# Patient Record
Sex: FEMALE | Race: WHITE | NOT HISPANIC OR LATINO | Employment: OTHER | URBAN - METROPOLITAN AREA
[De-identification: names, ages, dates, MRNs, and addresses within clinical notes are randomized per-mention and may not be internally consistent; named-entity substitution may affect disease eponyms.]

---

## 2016-01-13 LAB
CHOL/HDLC RATIO: 4.2
CHOLEST SERPL-MSCNC: 219 MG/DL (ref 0–200)
HDLC SERPL-MCNC: 52 MG/DL
LDLC SERPL CALC-MCNC: 139 MG/DL
NON HDL CHOL. (LDL+VLDL): 167
TRIGL SERPL-MCNC: 141 MG/DL

## 2017-09-12 ENCOUNTER — LAB VISIT (OUTPATIENT)
Dept: LAB | Facility: HOSPITAL | Age: 82
End: 2017-09-12
Attending: FAMILY MEDICINE
Payer: MEDICARE

## 2017-09-12 ENCOUNTER — OFFICE VISIT (OUTPATIENT)
Dept: FAMILY MEDICINE | Facility: CLINIC | Age: 82
End: 2017-09-12
Payer: MEDICARE

## 2017-09-12 VITALS
TEMPERATURE: 98 F | OXYGEN SATURATION: 96 % | HEIGHT: 67 IN | DIASTOLIC BLOOD PRESSURE: 60 MMHG | HEART RATE: 74 BPM | RESPIRATION RATE: 14 BRPM | SYSTOLIC BLOOD PRESSURE: 120 MMHG | BODY MASS INDEX: 25.14 KG/M2 | WEIGHT: 160.19 LBS

## 2017-09-12 DIAGNOSIS — H61.23 BILATERAL IMPACTED CERUMEN: ICD-10-CM

## 2017-09-12 DIAGNOSIS — R91.1 LUNG NODULE: ICD-10-CM

## 2017-09-12 DIAGNOSIS — F41.1 GENERALIZED ANXIETY DISORDER: Primary | ICD-10-CM

## 2017-09-12 DIAGNOSIS — I10 ESSENTIAL HYPERTENSION: ICD-10-CM

## 2017-09-12 DIAGNOSIS — Z79.01 LONG TERM CURRENT USE OF ANTICOAGULANT: ICD-10-CM

## 2017-09-12 DIAGNOSIS — E55.9 VITAMIN D DEFICIENCY: ICD-10-CM

## 2017-09-12 DIAGNOSIS — E04.2 MULTIPLE THYROID NODULES: ICD-10-CM

## 2017-09-12 DIAGNOSIS — J44.9 CHRONIC OBSTRUCTIVE PULMONARY DISEASE, UNSPECIFIED COPD TYPE: ICD-10-CM

## 2017-09-12 DIAGNOSIS — D32.9 MENINGIOMA: ICD-10-CM

## 2017-09-12 DIAGNOSIS — Z86.711 HISTORY OF PULMONARY EMBOLISM: ICD-10-CM

## 2017-09-12 DIAGNOSIS — I25.10 CORONARY ARTERY DISEASE INVOLVING NATIVE CORONARY ARTERY OF NATIVE HEART WITHOUT ANGINA PECTORIS: ICD-10-CM

## 2017-09-12 LAB
ALBUMIN SERPL BCP-MCNC: 3.6 G/DL
ALP SERPL-CCNC: 110 U/L
ALT SERPL W/O P-5'-P-CCNC: 20 U/L
ANION GAP SERPL CALC-SCNC: 11 MMOL/L
AST SERPL-CCNC: 22 U/L
BILIRUB SERPL-MCNC: 0.5 MG/DL
BUN SERPL-MCNC: 25 MG/DL
CALCIUM SERPL-MCNC: 9.5 MG/DL
CHLORIDE SERPL-SCNC: 100 MMOL/L
CO2 SERPL-SCNC: 31 MMOL/L
CREAT SERPL-MCNC: 0.8 MG/DL
EST. GFR  (AFRICAN AMERICAN): >60 ML/MIN/1.73 M^2
EST. GFR  (NON AFRICAN AMERICAN): >60 ML/MIN/1.73 M^2
GLUCOSE SERPL-MCNC: 71 MG/DL
INR PPP: 2.3
POTASSIUM SERPL-SCNC: 4.4 MMOL/L
PROT SERPL-MCNC: 7.2 G/DL
PROTHROMBIN TIME: 23.4 SEC
SODIUM SERPL-SCNC: 142 MMOL/L
T4 FREE SERPL-MCNC: 0.88 NG/DL
TSH SERPL DL<=0.005 MIU/L-ACNC: 2.23 UIU/ML

## 2017-09-12 PROCEDURE — 36415 COLL VENOUS BLD VENIPUNCTURE: CPT | Mod: PO

## 2017-09-12 PROCEDURE — 85025 COMPLETE CBC W/AUTO DIFF WBC: CPT

## 2017-09-12 PROCEDURE — 99999 PR PBB SHADOW E&M-NEW PATIENT-LVL IV: CPT | Mod: PBBFAC,,, | Performed by: FAMILY MEDICINE

## 2017-09-12 PROCEDURE — 1159F MED LIST DOCD IN RCRD: CPT | Mod: S$GLB,,, | Performed by: FAMILY MEDICINE

## 2017-09-12 PROCEDURE — 99204 OFFICE O/P NEW MOD 45 MIN: CPT | Mod: S$GLB,,, | Performed by: FAMILY MEDICINE

## 2017-09-12 PROCEDURE — 3008F BODY MASS INDEX DOCD: CPT | Mod: S$GLB,,, | Performed by: FAMILY MEDICINE

## 2017-09-12 PROCEDURE — 85610 PROTHROMBIN TIME: CPT

## 2017-09-12 PROCEDURE — 84439 ASSAY OF FREE THYROXINE: CPT

## 2017-09-12 PROCEDURE — 80053 COMPREHEN METABOLIC PANEL: CPT

## 2017-09-12 PROCEDURE — 84443 ASSAY THYROID STIM HORMONE: CPT

## 2017-09-12 PROCEDURE — 82306 VITAMIN D 25 HYDROXY: CPT

## 2017-09-12 PROCEDURE — 1126F AMNT PAIN NOTED NONE PRSNT: CPT | Mod: S$GLB,,, | Performed by: FAMILY MEDICINE

## 2017-09-12 RX ORDER — WARFARIN SODIUM 5 MG/1
5 TABLET ORAL DAILY
COMMUNITY
End: 2017-09-21 | Stop reason: SDUPTHER

## 2017-09-12 RX ORDER — ESCITALOPRAM OXALATE 20 MG/1
20 TABLET ORAL DAILY
COMMUNITY
End: 2017-09-12 | Stop reason: SDUPTHER

## 2017-09-12 RX ORDER — ALPRAZOLAM 0.25 MG/1
0.25 TABLET ORAL 2 TIMES DAILY PRN
Status: ON HOLD | COMMUNITY
End: 2019-12-31 | Stop reason: HOSPADM

## 2017-09-12 RX ORDER — ESCITALOPRAM OXALATE 20 MG/1
20 TABLET ORAL DAILY
Qty: 90 TABLET | Refills: 1 | Status: SHIPPED | OUTPATIENT
Start: 2017-09-12 | End: 2017-09-27 | Stop reason: SDUPTHER

## 2017-09-12 RX ORDER — WARFARIN 4 MG/1
4 TABLET ORAL DAILY
COMMUNITY
End: 2017-09-21 | Stop reason: SDUPTHER

## 2017-09-12 RX ORDER — HYDROXYZINE HYDROCHLORIDE 25 MG/1
25 TABLET, FILM COATED ORAL 3 TIMES DAILY PRN
COMMUNITY
End: 2019-02-27

## 2017-09-12 NOTE — PROGRESS NOTES
"Subjective:       Patient ID: Ashley Koenig is a 89 y.o. female.    Chief Complaint: Anxiety and Pulmonary Embolism    Anxiety   Presents for initial visit. Onset was 1 to 5 years ago. The problem has been waxing and waning. Symptoms include depressed mood and nervous/anxious behavior. Patient reports no chest pain, confusion, dizziness, excessive worry, irritability, malaise, nausea, palpitations, panic, restlessness, shortness of breath or suicidal ideas. Symptoms occur most days. The severity of symptoms is mild. The symptoms are aggravated by family issues (recent relocation to [place] from [place]; death of daughter). The quality of sleep is fair. Nighttime awakenings: occasional.     Risk factors include a major life event. Her past medical history is significant for anxiety/panic attacks, CAD, chronic lung disease and depression. There is no history of asthma or hyperthyroidism. Past treatments include SSRIs and benzodiazephines. The treatment provided significant relief. Compliance with prior treatments has been good.   Patient is brought to clinic today by her son. She has relocated to [place] from [place] within the past month. She has been maintained on Lexapro with occasional use of Xanax- she admits she has relief on Xanax more with the recent move than in the past. She generally takes 1/2 tab of Xanax when needed. Per her son a bottle may last an entire year. Most recently an RX for hydroxyzine was added to her medication regimen but she has not required use. She is living with her son. She is independent of some activities but does use a walker for ambulation. Sleep patterns vary. Appetite has been slightly decreased but she denies any concerns. She has never seen Psychiatry in the past. No known issues with memory. She does have history of meningioma and was previously followed by Neurology. At one point she had gamma knife therapy "many years ago" and the decision was made at that time to discontinue " monitoring.     Pulmonary Embolism  Patient reports having been placed on Coumadin after she was found to have a PE. Her son believes this was diagnosed two years ago. No known DVT. No known valvular issues. She had monthly INR monitoring with some recent difficulty with maintaining therapeutic state. Her last INR was at least 1 month ago before she moved from [place]- no dosing adjustment was made at that time. Patient notes bruising as a side effect of Coumadin but no bleeding issues. She is ambulatory with assistance of a walker as above. There have been no recent falls or injuries.     Past Medical History:   Diagnosis Date    Age-related macular degeneration, wet, both eyes     Anginal equivalent     Anxiety     BPPV (benign paroxysmal positional vertigo)     Brain tumor     Clotting disorder     lung    COPD (chronic obstructive pulmonary disease)     Depression     Diastolic dysfunction     Hearing loss     Heart disease     Hyperlipidemia     Hypertension     Hypoxemia     Lung nodule     Meningioma     Multiple thyroid nodules     Pulmonary embolism     Pulmonary hypertension     Sleep apnea in adult     Stroke     Vitamin D deficiency      Past Surgical History:   Procedure Laterality Date    TOTAL HIP ARTHROPLASTY       History reviewed. No pertinent family history.    Review of Systems   Constitutional: Negative for activity change, appetite change, fatigue, irritability and unexpected weight change.   HENT: Positive for dental problem and hearing loss. Negative for congestion, sinus pressure and sore throat.    Eyes: Positive for visual disturbance. Negative for pain, discharge and redness.   Respiratory: Negative for cough, chest tightness and shortness of breath.    Cardiovascular: Negative for chest pain, palpitations and leg swelling.   Gastrointestinal: Negative for abdominal pain, blood in stool, constipation, diarrhea and nausea.   Endocrine: Negative for cold intolerance  "and heat intolerance.   Genitourinary: Negative for decreased urine volume, difficulty urinating and dysuria.        +urinary incontinence   Musculoskeletal: Positive for arthralgias. Negative for myalgias.   Skin: Negative for color change and rash.   Neurological: Negative for dizziness, weakness, light-headedness and headaches.   Hematological: Bruises/bleeds easily.   Psychiatric/Behavioral: Negative for confusion, dysphoric mood and suicidal ideas. The patient is nervous/anxious.        Objective:   /60   Pulse 74   Temp 97.9 °F (36.6 °C) (Temporal)   Resp 14   Ht 5' 7" (1.702 m)   Wt 72.7 kg (160 lb 2.6 oz)   SpO2 96%   BMI 25.09 kg/m²   Physical Exam   Constitutional: She is oriented to person, place, and time. She appears well-developed and well-nourished. No distress.   HENT:   Head: Normocephalic and atraumatic.   Right Ear: External ear normal. No tenderness. Decreased hearing is noted.   Left Ear: External ear normal. No tenderness. Decreased hearing is noted.   Nose: Nose normal.   Mouth/Throat: Oropharynx is clear and moist. Abnormal dentition.   Cerumen within both ear canals; hearing aids   Eyes: Conjunctivae and EOM are normal. Pupils are equal, round, and reactive to light.   Neck: Normal range of motion. Neck supple. No thyromegaly present.   Cardiovascular: Normal rate, regular rhythm and normal heart sounds.    Pulmonary/Chest: Effort normal and breath sounds normal.   Abdominal: Soft. Bowel sounds are normal. There is no tenderness.   Musculoskeletal: She exhibits no edema or tenderness.   Neurological: She is alert and oriented to person, place, and time.   Skin: Skin is warm and dry. She is not diaphoretic.   Scattered bruising to the bilateral upper and lower extremities   Psychiatric: She has a normal mood and affect. Her speech is normal and behavior is normal.       Assessment:       1. Generalized anxiety disorder    2. Essential hypertension    3. Chronic obstructive " pulmonary disease, unspecified COPD type    4. Lung nodule    5. Coronary artery disease involving native coronary artery of native heart without angina pectoris    6. Meningioma    7. Multiple thyroid nodules    8. Vitamin D deficiency    9. Bilateral impacted cerumen    10. History of pulmonary embolism    11. Long term current use of anticoagulant        Plan:   Generalized anxiety disorder  Reviewed coping mechanisms. Discussed current treatment with the combination of Lexapro and Xanax. Have recommended against hydroxyzine use as well as Xanax use due to sedation and fall potential- particularly in a patient taking Coumadin. Will continue with Lexapro at current dosing. Patient is aware of counseling/therapy options within [facility] as well as Psychiatry if needed.  -     escitalopram oxalate (LEXAPRO) 20 MG tablet; Take 1 tablet (20 mg total) by mouth once daily.  Dispense: 90 tablet; Refill: 1    Essential hypertension  Stable BP without RX measures. Limited documents brought by the patient's son were reviewed with the patient in office today. She has not been on any RX measures in years per her recollection.   -     Comprehensive metabolic panel; Future; Expected date: 09/12/2017  -     CBC auto differential; Future; Expected date: 09/12/2017    Chronic obstructive pulmonary disease, unspecified COPD type  Patient's paperwork details previous diagnosis of COPD- she has no recollection. She is not maintained on any inhalers or nebulized treatments. She denies any chronic cough. Will send for formal records and consider whether further evaluation is necessary. A seasonal flu vaccine is recommended this fall.    Lung nodule  As per #3. Patient reports no knowledge. Awaiting receipt of records for additional recommendations.    Coronary artery disease involving native coronary artery of native heart without angina pectoris  Per limited paperwork brought to clinic today. She is not maintained on any therapy at present  and denies any symptoms of CP or palpitations. Will await records and determine whether further assessment per Cardiology is needed.    Meningioma  As per HPI she has elected to forego further monitoring and Neurologic assessment.     Multiple thyroid nodules  -     TSH; Future; Expected date: 09/12/2017  -     T4, free; Future; Expected date: 09/12/2017    Vitamin D deficiency  -     Vitamin D; Future; Expected date: 09/12/2017    Bilateral impacted cerumen  Consider Debrox drop use while awaiting ENT evaluation.  -     Ambulatory Referral to ENT    History of pulmonary embolism  Anticoagulated for at least the past 2 years by her former PCP according to the patient and her son. Have advised updated INR monitoring given current Coumadin dosing regimen. Discussed undergoing further evaluation per Heme/Onc to determine whether continued use given age/risk factors is advisable. If deemed appropriate she is asked to have levels managed by Coumadin Clinic.  -     Protime-INR; Future; Expected date: 09/12/2017  -     Ambulatory Referral to Hematology / Oncology    Long term current use of anticoagulant  As above.  -     Ambulatory Referral to Hematology / Oncology

## 2017-09-13 LAB
25(OH)D3+25(OH)D2 SERPL-MCNC: 28 NG/ML
BASOPHILS # BLD AUTO: 0.03 K/UL
BASOPHILS NFR BLD: 0.3 %
DIFFERENTIAL METHOD: ABNORMAL
EOSINOPHIL # BLD AUTO: 0.1 K/UL
EOSINOPHIL NFR BLD: 0.8 %
ERYTHROCYTE [DISTWIDTH] IN BLOOD BY AUTOMATED COUNT: 13.8 %
HCT VFR BLD AUTO: 47.1 %
HGB BLD-MCNC: 15.4 G/DL
LYMPHOCYTES # BLD AUTO: 1.8 K/UL
LYMPHOCYTES NFR BLD: 14.8 %
MCH RBC QN AUTO: 33.6 PG
MCHC RBC AUTO-ENTMCNC: 32.7 G/DL
MCV RBC AUTO: 103 FL
MONOCYTES # BLD AUTO: 0.8 K/UL
MONOCYTES NFR BLD: 6.9 %
NEUTROPHILS # BLD AUTO: 9.2 K/UL
NEUTROPHILS NFR BLD: 77.2 %
PLATELET # BLD AUTO: 351 K/UL
PLATELET BLD QL SMEAR: ABNORMAL
PMV BLD AUTO: 10.2 FL
RBC # BLD AUTO: 4.58 M/UL
WBC # BLD AUTO: 11.99 K/UL

## 2017-09-20 ENCOUNTER — TELEPHONE (OUTPATIENT)
Dept: FAMILY MEDICINE | Facility: CLINIC | Age: 82
End: 2017-09-20

## 2017-09-20 NOTE — TELEPHONE ENCOUNTER
----- Message from Efren Olivier sent at 9/19/2017  4:46 PM CDT -----  Contact: Pt   ..1. What is the name of the medication you are requesting? Zoloft   2. What is the dose? 20 mgs   3. How do you take the medication? Orally, topically, etc? Orally   4. How often do you take this medication? Once a day   5. Do you need a 30 day or 90 day supply? 30 day   6. How many refills are you requesting? 1   7. What is your preferred pharmacy and location of the pharmacy?       [address] [address] [facility]  [address] [address] [facility] [address]  Phone: [phone] Fax: [phone]      8. Who can we contact with further questions? Pt son Mook Koenig [phone]

## 2017-09-21 ENCOUNTER — INITIAL CONSULT (OUTPATIENT)
Dept: HEMATOLOGY/ONCOLOGY | Facility: CLINIC | Age: 82
End: 2017-09-21
Payer: MEDICARE

## 2017-09-21 ENCOUNTER — TELEPHONE (OUTPATIENT)
Dept: FAMILY MEDICINE | Facility: CLINIC | Age: 82
End: 2017-09-21

## 2017-09-21 VITALS
HEIGHT: 67 IN | RESPIRATION RATE: 16 BRPM | HEART RATE: 74 BPM | WEIGHT: 159.38 LBS | DIASTOLIC BLOOD PRESSURE: 54 MMHG | SYSTOLIC BLOOD PRESSURE: 100 MMHG | BODY MASS INDEX: 25.01 KG/M2 | OXYGEN SATURATION: 90 %

## 2017-09-21 DIAGNOSIS — Z86.711 HISTORY OF PULMONARY EMBOLISM: ICD-10-CM

## 2017-09-21 DIAGNOSIS — Z79.01 LONG TERM CURRENT USE OF ANTICOAGULANT: Primary | ICD-10-CM

## 2017-09-21 PROCEDURE — 3008F BODY MASS INDEX DOCD: CPT | Mod: S$GLB,,, | Performed by: INTERNAL MEDICINE

## 2017-09-21 PROCEDURE — 1159F MED LIST DOCD IN RCRD: CPT | Mod: S$GLB,,, | Performed by: INTERNAL MEDICINE

## 2017-09-21 PROCEDURE — 99999 PR PBB SHADOW E&M-EST. PATIENT-LVL III: CPT | Mod: PBBFAC,,, | Performed by: INTERNAL MEDICINE

## 2017-09-21 PROCEDURE — 99205 OFFICE O/P NEW HI 60 MIN: CPT | Mod: S$GLB,,, | Performed by: INTERNAL MEDICINE

## 2017-09-21 PROCEDURE — 1126F AMNT PAIN NOTED NONE PRSNT: CPT | Mod: S$GLB,,, | Performed by: INTERNAL MEDICINE

## 2017-09-21 RX ORDER — WARFARIN SODIUM 5 MG/1
5 TABLET ORAL DAILY
Qty: 30 TABLET | Refills: 11 | Status: SHIPPED | OUTPATIENT
Start: 2017-09-21 | End: 2017-10-11

## 2017-09-21 RX ORDER — WARFARIN 4 MG/1
4 TABLET ORAL DAILY
Qty: 30 TABLET | Refills: 11 | Status: SHIPPED | OUTPATIENT
Start: 2017-09-21 | End: 2017-10-11

## 2017-09-21 NOTE — LETTER
September 21, 2017      Fadumo Crawford MD  [address] [address] [address]           [facility]  [id number] [place] [address]  Phone: [phone]  Fax: [phone]          Patient: Ashley Koenig   MR Number: [id number]   YOB: 1926   Date of Visit: 9/21/2017       Dear Dr. Fadumo Crawford:    Thank you for referring Ashley Koenig to me for evaluation. Attached you will find relevant portions of my assessment and plan of care.    If you have questions, please do not hesitate to call me. I look forward to following Ashley Koenig along with you.    Sincerely,    Anoop Snyder MD    Enclosure  CC:  No Recipients    If you would like to receive this communication electronically, please contact [email or web address] or [phone] to request more information on [employer] Link access.    For providers and/or their staff who would like to refer a patient to [facility], please contact us through our one-stop-shop provider referral line, [facility], at [phone].    If you feel you have received this communication in error or would no longer like to receive these types of communications, please e-mail [email or web address]

## 2017-09-21 NOTE — PROGRESS NOTES
Subjective:       Patient ID: Ashley Koenig is a 89 y.o. female.    Chief Complaint: Follow-up; Results; and Coagulation Disorder    HPI 89-year-old female with history of pulmonary emboli 2 years ago and treated in [place] the patient presents after moving here to [place] for monitoring of Coumadin.  The patient has been on Coumadin for 2 years she is not sure nor is her son who is present with her why she has been on such a prolonged dose of medication.    Past Medical History:   Diagnosis Date    Age-related macular degeneration, wet, both eyes     Anginal equivalent     Anxiety     BPPV (benign paroxysmal positional vertigo)     Brain tumor     Clotting disorder     lung    COPD (chronic obstructive pulmonary disease)     Depression     Diastolic dysfunction     Hearing loss     Heart disease     Hyperlipidemia     Hypertension     Hypoxemia     Lung nodule     Meningioma     Multiple thyroid nodules     Pulmonary embolism     Pulmonary hypertension     Sleep apnea in adult     Stroke     Vitamin D deficiency      History reviewed. No pertinent family history.  Social History     Social History    Marital status:      Spouse name: N/A    Number of children: N/A    Years of education: N/A     Occupational History    Not on file.     Social History Main Topics    Smoking status: Never Smoker    Smokeless tobacco: Never Used    Alcohol use No    Drug use: No    Sexual activity: Not Currently     Other Topics Concern    Not on file     Social History Narrative    No narrative on file     Past Surgical History:   Procedure Laterality Date    TOTAL HIP ARTHROPLASTY         Labs:  Lab Results   Component Value Date    WBC 11.99 09/12/2017    HGB 15.4 09/12/2017    HCT 47.1 09/12/2017     (H) 09/12/2017     (H) 09/12/2017     BMP  Lab Results   Component Value Date     09/12/2017    K 4.4 09/12/2017     09/12/2017    CO2 31 (H) 09/12/2017     BUN 25 09/12/2017    CREATININE 0.8 09/12/2017    CALCIUM 9.5 09/12/2017    ANIONGAP 11 09/12/2017    ESTGFRAFRICA >60.0 09/12/2017    EGFRNONAA >60.0 09/12/2017     Lab Results   Component Value Date    ALT 20 09/12/2017    AST 22 09/12/2017    ALKPHOS 110 09/12/2017    BILITOT 0.5 09/12/2017       No results found for: IRON, TIBC, FERRITIN, SATURATEDIRO  No results found for: [id number]  No results found for: FOLATE  Lab Results   Component Value Date    TSH 2.229 09/12/2017         Review of Systems   Constitutional: Positive for activity change and fatigue. Negative for appetite change, chills, diaphoresis, fever and unexpected weight change.   HENT: Negative for congestion, dental problem, drooling, ear discharge, ear pain, facial swelling, hearing loss, mouth sores, nosebleeds, postnasal drip, rhinorrhea, sinus pressure, sneezing, sore throat, tinnitus, trouble swallowing and voice change.    Eyes: Negative for photophobia, pain, discharge, redness, itching and visual disturbance.   Respiratory: Negative for cough, choking, chest tightness, shortness of breath, wheezing and stridor.    Cardiovascular: Negative for chest pain, palpitations and leg swelling.   Gastrointestinal: Negative for abdominal distention, abdominal pain, anal bleeding, blood in stool, constipation, diarrhea, nausea, rectal pain and vomiting.   Endocrine: Negative for cold intolerance, heat intolerance, polydipsia, polyphagia and polyuria.   Genitourinary: Negative for decreased urine volume, difficulty urinating, dyspareunia, dysuria, enuresis, flank pain, frequency, genital sores, hematuria, menstrual problem, pelvic pain, urgency, vaginal bleeding, vaginal discharge and vaginal pain.   Musculoskeletal: Positive for gait problem. Negative for arthralgias, back pain, joint swelling, myalgias, neck pain and neck stiffness.   Skin: Negative for color change, pallor and rash.   Allergic/Immunologic: Negative for environmental allergies, food  allergies and immunocompromised state.   Neurological: Positive for weakness. Negative for dizziness, tremors, seizures, syncope, facial asymmetry, speech difficulty, light-headedness, numbness and headaches.   Hematological: Negative for adenopathy. Does not bruise/bleed easily.   Psychiatric/Behavioral: Positive for dysphoric mood. Negative for agitation, behavioral problems, confusion, decreased concentration, hallucinations, self-injury, sleep disturbance and suicidal ideas. The patient is nervous/anxious. The patient is not hyperactive.        Objective:      Physical Exam   Constitutional: She is oriented to person, place, and time. She appears cachectic. She has a sickly appearance. She appears ill. She appears distressed.   HENT:   Head: Normocephalic and atraumatic.   Right Ear: External ear normal. Decreased hearing is noted.   Left Ear: External ear normal. Decreased hearing is noted.   Ears:    Nose: Nose normal. Right sinus exhibits no maxillary sinus tenderness and no frontal sinus tenderness. Left sinus exhibits no maxillary sinus tenderness and no frontal sinus tenderness.   Mouth/Throat: Oropharynx is clear and moist. No oropharyngeal exudate.   Eyes: Conjunctivae, EOM and lids are normal. Pupils are equal, round, and reactive to light. Right eye exhibits no discharge. Left eye exhibits no discharge. Right conjunctiva is not injected. Right conjunctiva has no hemorrhage. Left conjunctiva is not injected. Left conjunctiva has no hemorrhage. No scleral icterus.   Neck: Normal range of motion. Neck supple. No JVD present. No tracheal deviation present. No thyromegaly present.   Cardiovascular: Normal rate, regular rhythm, normal heart sounds and intact distal pulses.    Pulmonary/Chest: Effort normal and breath sounds normal. No stridor. No respiratory distress. She exhibits no tenderness.   Abdominal: Soft. Bowel sounds are normal. She exhibits no distension and no mass. There is no splenomegaly or  hepatomegaly. There is no tenderness. There is no rebound.   Musculoskeletal: Normal range of motion. She exhibits no edema or tenderness.   Lymphadenopathy:     She has no cervical adenopathy.     She has no axillary adenopathy.        Right: No supraclavicular adenopathy present.        Left: No supraclavicular adenopathy present.   Neurological: She is alert and oriented to person, place, and time. No cranial nerve deficit. Coordination abnormal.   Skin: Skin is dry. No rash noted. She is not diaphoretic. No erythema.   Psychiatric: Her behavior is normal. Judgment and thought content normal. Her mood appears anxious. She exhibits a depressed mood.   Vitals reviewed.          Assessment:      1. Long term current use of anticoagulant    2. History of pulmonary embolism           Plan:   I'm not sure why the patient is on such prolonged warfarin therapy she is unsteady I will continue with this at the present time try to obtain outside records she has been referred to ambulatory Coumadin monitoring clinic for evaluation of see back in 2-3 weeks after information is obtained for my review I have refilled her prescription she currently takes warfarin 4 mg daily except for Wednesdays and Thursdays when she takes 5 mg

## 2017-09-21 NOTE — TELEPHONE ENCOUNTER
----- Message from Kerline Love LPN sent at 9/21/2017  1:25 PM CDT -----  Do you guys have outside records on this patient? Dr bailon is asking. Thanks, Kerline

## 2017-09-22 ENCOUNTER — TELEPHONE (OUTPATIENT)
Dept: CARDIOLOGY | Facility: CLINIC | Age: 82
End: 2017-09-22

## 2017-09-25 ENCOUNTER — TELEPHONE (OUTPATIENT)
Dept: FAMILY MEDICINE | Facility: CLINIC | Age: 82
End: 2017-09-25

## 2017-09-25 NOTE — TELEPHONE ENCOUNTER
Called and spoke with pt's son, informed him that we received from the pharmacy that 20 mg of lexapro is not recommended for pt due to her age. Pt's son became very verbal and aggressive with his words and I informed him again of pt's age the 20 mg is not recommended. Dr. Parker could send in the 10 mg , but not the 20 mg . Pt son stated he will change her insurance with cuss words and change Dr's with cuss words. I told pt's son if he would like he can talk to my supervisor. Pt then cussed again and I informed him I will pass this along and pt son hung up.

## 2017-09-25 NOTE — TELEPHONE ENCOUNTER
----- Message from Bren Carrillo sent at 9/25/2017 11:32 AM CDT -----  Contact: son- [phone]  2nd call regarding RX refill.

## 2017-09-25 NOTE — TELEPHONE ENCOUNTER
----- Message from Rui Wells sent at 9/25/2017  9:03 AM CDT -----  Contact: [phone]  Pt returning nurse call about Lexapro refill. Please call bk at [phone]. Thx. lj    .    [address] [address] [facility]  [address] [address] [facility] [address]  Phone: [phone] Fax: [phone]

## 2017-09-26 PROBLEM — F41.9 ANXIETY: Status: ACTIVE | Noted: 2017-09-26

## 2017-09-27 ENCOUNTER — OFFICE VISIT (OUTPATIENT)
Dept: INTERNAL MEDICINE | Facility: CLINIC | Age: 82
End: 2017-09-27
Payer: MEDICARE

## 2017-09-27 VITALS
WEIGHT: 158 LBS | BODY MASS INDEX: 24.8 KG/M2 | HEART RATE: 65 BPM | OXYGEN SATURATION: 96 % | SYSTOLIC BLOOD PRESSURE: 116 MMHG | TEMPERATURE: 98 F | DIASTOLIC BLOOD PRESSURE: 72 MMHG | HEIGHT: 67 IN

## 2017-09-27 DIAGNOSIS — Z79.01 LONG TERM CURRENT USE OF ANTICOAGULANT: Primary | ICD-10-CM

## 2017-09-27 DIAGNOSIS — F41.9 ANXIETY: ICD-10-CM

## 2017-09-27 DIAGNOSIS — Z86.711 HISTORY OF PULMONARY EMBOLISM: ICD-10-CM

## 2017-09-27 DIAGNOSIS — F41.1 GENERALIZED ANXIETY DISORDER: ICD-10-CM

## 2017-09-27 PROCEDURE — 99214 OFFICE O/P EST MOD 30 MIN: CPT | Mod: S$GLB,,, | Performed by: FAMILY MEDICINE

## 2017-09-27 PROCEDURE — 99999 PR PBB SHADOW E&M-EST. PATIENT-LVL III: CPT | Mod: PBBFAC,,, | Performed by: FAMILY MEDICINE

## 2017-09-27 PROCEDURE — 3008F BODY MASS INDEX DOCD: CPT | Mod: S$GLB,,, | Performed by: FAMILY MEDICINE

## 2017-09-27 PROCEDURE — 1126F AMNT PAIN NOTED NONE PRSNT: CPT | Mod: S$GLB,,, | Performed by: FAMILY MEDICINE

## 2017-09-27 PROCEDURE — 1159F MED LIST DOCD IN RCRD: CPT | Mod: S$GLB,,, | Performed by: FAMILY MEDICINE

## 2017-09-27 RX ORDER — ESCITALOPRAM OXALATE 20 MG/1
20 TABLET ORAL DAILY
Qty: 90 TABLET | Refills: 1 | Status: SHIPPED | OUTPATIENT
Start: 2017-09-27 | End: 2017-12-12 | Stop reason: SDUPTHER

## 2017-09-27 NOTE — PROGRESS NOTES
Ashley Koenig  09/27/2017  [id number]    Fadumo Crawford MD  Patient Care Team:  Fadumo Crawford MD as PCP - General (Family Medicine)  Has the patient seen any provider outside of the [facility] network since the last visit? (no). If yes, HIPPA forms completed and records requested.        Visit Type:a scheduled routine follow-up visit    Chief Complaint:  No chief complaint on file.      History of Present Illness:  89 year old here with her son.  She initially established care with Dr. Elena Irizarry. Last appt with her was on 9/12.    She gave history of Anxiey and panic disorder. She has been maintained on Lexapro 20 mg and prn use of xanax.  Patient reports that she will take 1/2 table of Xanax when needed. She lives with her son, after relocating from [place].     She did see Dr. Snyder in consult. She came to Dr. Parker on Coumadin for a PE, but had been on this reported for year. Denies falls or bleeding risk. He has continued her on the Coumadin until more records could be obtained.     She has a remote history of meningioma, but reports decided against future survillence due to advanced age and stablity.    Son called yesterday to get refill on her Lexapro, but there was questioning on the dose due to her advanced age. The son became angry and that is why he is here seeing me at this clinic.  Answers for HPI/ROS submitted by the patient on 9/26/2017   activity change: No  unexpected weight change: No  rhinorrhea: No  trouble swallowing: No  visual disturbance: No  chest tightness: No  polyuria: No  difficulty urinating: No  menstrual problem: No  joint swelling: No  arthralgias: No  confusion: No  dysphoric mood: No          History:  Past Medical History:   Diagnosis Date    Age-related macular degeneration, wet, both eyes     Anginal equivalent     Anxiety     BPPV (benign paroxysmal positional vertigo)     Brain tumor     Clotting disorder     lung    COPD (chronic obstructive  pulmonary disease)     Depression     Diastolic dysfunction     Hearing loss     Heart disease     Hyperlipidemia     Hypertension     Hypoxemia     Lung nodule     Meningioma     Multiple thyroid nodules     Pulmonary embolism     Pulmonary hypertension     Sleep apnea in adult     Stroke     Vitamin D deficiency      Past Surgical History:   Procedure Laterality Date    TOTAL HIP ARTHROPLASTY       No family history on file.  Social History     Social History    Marital status:      Spouse name: N/A    Number of children: N/A    Years of education: N/A     Occupational History    Not on file.     Social History Main Topics    Smoking status: Never Smoker    Smokeless tobacco: Never Used    Alcohol use No    Drug use: No    Sexual activity: Not Currently     Other Topics Concern    Not on file     Social History Narrative    No narrative on file     Patient Active Problem List   Diagnosis    Meningioma    History of pulmonary embolism    Long term current use of anticoagulant    Coronary artery disease involving native coronary artery of native heart without angina pectoris    Lung nodule    Chronic obstructive pulmonary disease    Multiple thyroid nodules    Anxiety     Review of patient's allergies indicates:  No Known Allergies    The following were reviewed at this visit: active problem list, medication list, allergies, family history, social history, and health maintenance.    Medications:  Current Outpatient Prescriptions on File Prior to Visit   Medication Sig Dispense Refill    alprazolam (XANAX) 0.25 MG tablet Take 0.25 mg by mouth 2 (two) times daily as needed for Anxiety.      escitalopram oxalate (LEXAPRO) 20 MG tablet Take 1 tablet (20 mg total) by mouth once daily. 90 tablet 1    hydrOXYzine HCl (ATARAX) 25 MG tablet Take 25 mg by mouth 3 (three) times daily as needed for Itching.      warfarin (COUMADIN) 4 MG tablet Take 1 tablet (4 mg total) by mouth  Daily. ALL DAYS BUT wed & Friday WHEN TAKING 5mg 30 tablet 11    warfarin (COUMADIN) 5 MG tablet Take 1 tablet (5 mg total) by mouth Daily. Wed &fRIDAY ONLY 30 tablet 11     No current facility-administered medications on file prior to visit.        Medications have been reviewed and reconciled with patient at this visit.  Barriers to medications present (no)    Adverse reactions to current medications (no)    Over the counter medications reviewed (Yes ), and if needed added to active Medication list at this visit.     Exam:  Wt Readings from Last 3 Encounters:   09/21/17 72.3 kg (159 lb 6.3 oz)   09/12/17 72.7 kg (160 lb 2.6 oz)     Temp Readings from Last 3 Encounters:   09/12/17 97.9 °F (36.6 °C) (Temporal)     BP Readings from Last 3 Encounters:   09/21/17 (!) 100/54   09/12/17 120/60     Pulse Readings from Last 3 Encounters:   09/21/17 74   09/12/17 74     There is no height or weight on file to calculate BMI.      Review of Systems   HENT: Negative for hearing loss.    Eyes: Negative for discharge.   Respiratory: Negative for wheezing.    Cardiovascular: Negative for chest pain and palpitations.   Gastrointestinal: Negative for blood in stool, constipation, diarrhea and vomiting.   Genitourinary: Negative for dysuria and hematuria.   Musculoskeletal: Negative for neck pain.   Neurological: Negative for weakness and headaches.   Endo/Heme/Allergies: Negative for polydipsia.   Psychiatric/Behavioral: Negative for depression.       Physical Exam   Constitutional: She is oriented to person, place, and time. She appears well-developed and well-nourished. No distress.   HENT:   Head: Normocephalic and atraumatic.   Left Ear: External ear normal.   Nose: Nose normal.   Mouth/Throat: No oropharyngeal exudate.   Eyes: Conjunctivae and EOM are normal. Pupils are equal, round, and reactive to light. Right eye exhibits no discharge. Left eye exhibits no discharge.   Neck: Normal range of motion. Neck supple. No  thyromegaly present.   Cardiovascular: Normal rate, regular rhythm, normal heart sounds and intact distal pulses.    No murmur heard.  Pulmonary/Chest: Effort normal and breath sounds normal. No respiratory distress. She has no wheezes.   Musculoskeletal: Normal range of motion. She exhibits no edema.   Lymphadenopathy:     She has no cervical adenopathy.   Neurological: She is alert and oriented to person, place, and time.   Skin: She is not diaphoretic.   Psychiatric: She has a normal mood and affect. Her behavior is normal. Judgment and thought content normal.   Nursing note and vitals reviewed.      Laboratory Reviewed ({Yes)  Lab Results   Component Value Date    WBC 11.99 09/12/2017    HGB 15.4 09/12/2017    HCT 47.1 09/12/2017     (H) 09/12/2017    ALT 20 09/12/2017    AST 22 09/12/2017     09/12/2017    K 4.4 09/12/2017     09/12/2017    CREATININE 0.8 09/12/2017    BUN 25 09/12/2017    CO2 31 (H) 09/12/2017    TSH 2.229 09/12/2017    INR 2.3 (H) 09/12/2017       Assessment:  The primary encounter diagnosis was Long term current use of anticoagulant. Diagnoses of History of pulmonary embolism and Anxiety were also pertinent to this visit.     Plan     Diagnoses and all orders for this visit:    Long term current use of anticoagulant   Followed by Hematology   As of now on the Coumadin.   INR last week in range.   Awaiting records to see why chronic coumadin    History of pulmonary embolism   On Coumadin    Anxiety   Her treatment plan from previous PCP was  Lexapro 20 mg tablets, and prn xanax. She    She has been stable on this dose.   Previous provider discussed geratric dosing  Per Pharmacy recommendations to go to  10mg.   I reviewed Up To Date information and Prescribing information with patient and family.  Advised to step down to 10 mg of Lexapro and see how anxiety is doing. She has the prn xanax to use if needed.    Other health maintenance reviewed from previous PCP notes.  Awaiting records from [place].         Care Plan/Goals: Reviewed  (N/A)  Goals     None          Follow up: No Follow-up on file.    After visit summary was printed and given to patient upon discharge today.  Patient goals and care plan are included in After Visit Summary.

## 2017-09-27 NOTE — PATIENT INSTRUCTIONS
Your Bodys Response to Anxiety    Normal anxiety is part of the bodys natural defense system. It's an alert to a threat that is unknown, vague, or comes from your own internal fears. While youre in this state, your feelings can range from a vague sense of worry to physical sensations such as a pounding heartbeat. These feelings make you want to react to the threat. An anxiety response is normal in many situations. But when you have an anxiety disorder, the same response can occur at the wrong times.  Anxiety can be helpful  Normal anxiety is a signal from your brain that warns you of a threat and is a normal response to help you prevent something or decrease the bad effects of something you can't control. For example, anxiety is a normal response to situations that might damage your body, separate you from a loved one, or lose your job. The symptoms of anxiety can be physical and mental.  How does it feel?  At certain times, people with anxiety may have:  · Dizziness  · Muscle tension or pain  · Restlessness  · Sleeplessness  · Trouble concentrating  · Racing heartbeat  · Fast breathing  · Shaking or trembling  · Stomachache  · Diarrhea  · Loss of energy  · Sweating  · Cold, clammy hands  · Chest pain  · Dry mouth  Anxiety can also be a problem  Anxiety can become a problem when it is hard to control, occurs for months, and interferes with important parts of your life. With an anxiety disorder, your body has the response described above, but in inappropriate ways. The response a person has depends on the anxiety disorder he or she has. With some disorders, the anxiety is way out of proportion to the threat that triggers it. With others, anxiety may occur even when there isnt a clear threat or trigger.  Who does it affect?  Some people are more prone to persistent anxiety than others. It tends to run in families, and it affects more younger people than older people, and more women than men. But no age, race, or  "gender is immune to anxiety problems.  Anxiety can be treated  The good news is that the anxiety thats disrupting your life can be treated. Check with your healthcare provider and rule out any physical problems that may be causing the anxiety symptoms. If an anxiety disorder is diagnosed seek mental healthcare. This is an illness and it can respond to treatment. Most types of anxiety disorders will respond to "talk therapy" and medicines. Working with your doctor or other healthcare provider, you can develop skills to help you cope with anxiety. You can also gain the perspective you need to overcome your fears. Note: Good sources of support or guidance can be found at your local hospital, mental health clinic, or an employee assistance program.  How to cope with anxiety  If anxiety is wearing you down, here are some things you can do to cope:  · Keep in mind that you cant control everything about a situation. Change what you can and let the rest take its course.  · Exercise--its a great way to relieve tension and help your body feel relaxed.  · Avoid caffeine and nicotine, which can make anxiety symptoms worse.  · Fight the temptation to turn to alcohol or unprescribed drugs for relief. They only make things worse in the long run.  · Educate yourself about anxiety disorders. Keep track of helpful online resources and books you can use during stressful periods.  · Try stress management techniques such as meditation.  · Consider online or in-person support groups.   Date Last Reviewed: 1/1/2017  © 6634-7518 [employer]. [address] [place] [address], [place] [address]. All rights reserved. This information is not intended as a substitute for professional medical care. Always follow your healthcare professional's instructions.        "

## 2017-10-10 ENCOUNTER — PATIENT MESSAGE (OUTPATIENT)
Dept: INTERNAL MEDICINE | Facility: CLINIC | Age: 82
End: 2017-10-10

## 2017-10-10 ENCOUNTER — OFFICE VISIT (OUTPATIENT)
Dept: HEMATOLOGY/ONCOLOGY | Facility: CLINIC | Age: 82
End: 2017-10-10
Payer: MEDICARE

## 2017-10-10 ENCOUNTER — ANTI-COAG VISIT (OUTPATIENT)
Dept: CARDIOLOGY | Facility: CLINIC | Age: 82
End: 2017-10-10

## 2017-10-10 VITALS
OXYGEN SATURATION: 91 % | HEIGHT: 67 IN | WEIGHT: 158.75 LBS | DIASTOLIC BLOOD PRESSURE: 60 MMHG | TEMPERATURE: 99 F | BODY MASS INDEX: 24.92 KG/M2 | HEART RATE: 65 BPM | SYSTOLIC BLOOD PRESSURE: 106 MMHG

## 2017-10-10 DIAGNOSIS — Z79.01 LONG TERM CURRENT USE OF ANTICOAGULANT: Primary | ICD-10-CM

## 2017-10-10 PROCEDURE — 99999 PR PBB SHADOW E&M-EST. PATIENT-LVL III: CPT | Mod: PBBFAC,,, | Performed by: INTERNAL MEDICINE

## 2017-10-10 PROCEDURE — 99214 OFFICE O/P EST MOD 30 MIN: CPT | Mod: S$GLB,,, | Performed by: INTERNAL MEDICINE

## 2017-10-10 NOTE — PROGRESS NOTES
Subjective:       Patient ID: Ashley Koenig is a 89 y.o. female.    Chief Complaint: Coagulation Disorder and Results    HPI 89-year-old female returns with son patient had requested outside records but not able to be found the patient did bring in fact that there was an after visit summary from in [facility] provider in [facility]    Past Medical History:   Diagnosis Date    Age-related macular degeneration, wet, both eyes     Anginal equivalent     Anxiety     BPPV (benign paroxysmal positional vertigo)     Brain tumor     Clotting disorder     lung    COPD (chronic obstructive pulmonary disease)     Depression     Diastolic dysfunction     Hearing loss     Heart disease     Hyperlipidemia     Hypertension     Hypoxemia     Lung nodule     Meningioma     Multiple thyroid nodules     Pulmonary embolism     Pulmonary hypertension     Sleep apnea in adult     Stroke     Vitamin D deficiency      History reviewed. No pertinent family history.  Social History     Social History    Marital status:      Spouse name: N/A    Number of children: N/A    Years of education: N/A     Occupational History    Not on file.     Social History Main Topics    Smoking status: Never Smoker    Smokeless tobacco: Never Used    Alcohol use No    Drug use: No    Sexual activity: Not Currently     Other Topics Concern    Not on file     Social History Narrative    No narrative on file     Past Surgical History:   Procedure Laterality Date    TOTAL HIP ARTHROPLASTY         Labs:  Lab Results   Component Value Date    WBC 11.99 09/12/2017    HGB 15.4 09/12/2017    HCT 47.1 09/12/2017     (H) 09/12/2017     (H) 09/12/2017     BMP  Lab Results   Component Value Date     09/12/2017    K 4.4 09/12/2017     09/12/2017    CO2 31 (H) 09/12/2017    BUN 25 09/12/2017    CREATININE 0.8 09/12/2017    CALCIUM 9.5 09/12/2017    ANIONGAP 11 09/12/2017    ESTGFRAFRICA >60.0 09/12/2017     EGFRNONAA >60.0 09/12/2017     Lab Results   Component Value Date    ALT 20 09/12/2017    AST 22 09/12/2017    ALKPHOS 110 09/12/2017    BILITOT 0.5 09/12/2017       No results found for: IRON, TIBC, FERRITIN, SATURATEDIRO  No results found for: TUVNCQSE13  No results found for: FOLATE  Lab Results   Component Value Date    TSH 2.229 09/12/2017         Review of Systems   Constitutional: Positive for activity change and fatigue. Negative for appetite change, chills, diaphoresis, fever and unexpected weight change.   HENT: Positive for hearing loss. Negative for congestion, dental problem, drooling, ear discharge, ear pain, facial swelling, mouth sores, nosebleeds, postnasal drip, rhinorrhea, sinus pressure, sneezing, sore throat, tinnitus, trouble swallowing and voice change.    Eyes: Negative for photophobia, pain, discharge, redness, itching and visual disturbance.   Respiratory: Negative for cough, choking, chest tightness, shortness of breath, wheezing and stridor.    Cardiovascular: Negative for chest pain, palpitations and leg swelling.   Gastrointestinal: Negative for abdominal distention, abdominal pain, anal bleeding, blood in stool, constipation, diarrhea, nausea, rectal pain and vomiting.   Endocrine: Negative for cold intolerance, heat intolerance, polydipsia, polyphagia and polyuria.   Genitourinary: Negative for decreased urine volume, difficulty urinating, dyspareunia, dysuria, enuresis, flank pain, frequency, genital sores, hematuria, menstrual problem, pelvic pain, urgency, vaginal bleeding, vaginal discharge and vaginal pain.   Musculoskeletal: Positive for gait problem. Negative for arthralgias, back pain, joint swelling, myalgias, neck pain and neck stiffness.   Skin: Negative for color change, pallor and rash.   Allergic/Immunologic: Negative for environmental allergies, food allergies and immunocompromised state.   Neurological: Positive for weakness. Negative for dizziness, tremors,  seizures, syncope, facial asymmetry, speech difficulty, light-headedness, numbness and headaches.   Hematological: Negative for adenopathy. Does not bruise/bleed easily.   Psychiatric/Behavioral: Positive for dysphoric mood. Negative for agitation, behavioral problems, confusion, decreased concentration, hallucinations, self-injury, sleep disturbance and suicidal ideas. The patient is nervous/anxious. The patient is not hyperactive.        Objective:      Physical Exam   Constitutional: She is oriented to person, place, and time. She appears distressed.   HENT:   Head: Normocephalic and atraumatic.   Right Ear: External ear normal.   Left Ear: External ear normal.   Nose: Nose normal. Right sinus exhibits no maxillary sinus tenderness and no frontal sinus tenderness. Left sinus exhibits no maxillary sinus tenderness and no frontal sinus tenderness.   Mouth/Throat: Oropharynx is clear and moist. No oropharyngeal exudate.   Eyes: Conjunctivae, EOM and lids are normal. Pupils are equal, round, and reactive to light. Right eye exhibits no discharge. Left eye exhibits no discharge. Right conjunctiva is not injected. Right conjunctiva has no hemorrhage. Left conjunctiva is not injected. Left conjunctiva has no hemorrhage. No scleral icterus.   Neck: Normal range of motion. Neck supple. No JVD present. No tracheal deviation present. No thyromegaly present.   Cardiovascular: Normal rate and regular rhythm.    Pulmonary/Chest: Effort normal. No stridor. No respiratory distress. She exhibits no tenderness.   Abdominal: Soft. She exhibits no distension and no mass. There is no splenomegaly or hepatomegaly. There is no tenderness. There is no rebound.   Musculoskeletal: Normal range of motion. She exhibits no edema or tenderness.   Lymphadenopathy:     She has no cervical adenopathy.     She has no axillary adenopathy.        Right: No supraclavicular adenopathy present.        Left: No supraclavicular adenopathy present.    Neurological: She is alert and oriented to person, place, and time. No cranial nerve deficit. Coordination normal.   Skin: Skin is dry. No rash noted. She is not diaphoretic. No erythema.   Psychiatric: Her behavior is normal. Judgment and thought content normal. Her mood appears anxious. She exhibits a depressed mood.   Vitals reviewed.          Assessment:      Long-term anticoagulant use history of pulmonary embolus 2014      Plan:   Reviewed note from cardiology 2014 through care everywhere of Epic no precipitating event discussion at that point as to whether not to continue on long-term anticoagulation at this point my recommendations at her age and the fact that she has a gait disturbance I believe that the continuation of warfarin is more risky than recurrent disease talked about the use of novel agents.  At this point family wishes to treat with aspirin 81 mg per day to reduce risk of recurrent DVT agree discontinuation of warfarin refer back to primary care physician

## 2017-10-11 ENCOUNTER — OFFICE VISIT (OUTPATIENT)
Dept: OTOLARYNGOLOGY | Facility: CLINIC | Age: 82
End: 2017-10-11
Payer: MEDICARE

## 2017-10-11 VITALS
TEMPERATURE: 99 F | BODY MASS INDEX: 24.93 KG/M2 | DIASTOLIC BLOOD PRESSURE: 62 MMHG | HEART RATE: 72 BPM | WEIGHT: 159.19 LBS | SYSTOLIC BLOOD PRESSURE: 118 MMHG

## 2017-10-11 DIAGNOSIS — H91.93 BILATERAL HEARING LOSS, UNSPECIFIED HEARING LOSS TYPE: ICD-10-CM

## 2017-10-11 DIAGNOSIS — H61.23 BILATERAL IMPACTED CERUMEN: Primary | ICD-10-CM

## 2017-10-11 PROCEDURE — 99499 UNLISTED E&M SERVICE: CPT | Mod: S$GLB,,, | Performed by: PHYSICIAN ASSISTANT

## 2017-10-11 PROCEDURE — 99999 PR PBB SHADOW E&M-EST. PATIENT-LVL III: CPT | Mod: PBBFAC,,, | Performed by: PHYSICIAN ASSISTANT

## 2017-10-11 PROCEDURE — 69210 REMOVE IMPACTED EAR WAX UNI: CPT | Mod: S$GLB,,, | Performed by: PHYSICIAN ASSISTANT

## 2017-10-11 NOTE — PROGRESS NOTES
Subjective:   Cerumen impactions     Patient ID: Ashley Koenig is a 89 y.o. female.    Chief Complaint:  Excessive ear wax     Ashley Koenig is a 89 y.o. female here to see me today for evaluation of a possible wax impaction in bilateral ears.  She has complaints of hearing loss in the affected ears, but denies pain or drainage.  This has been an issue in the past.  The patient has been using any sort of ear drop to soften the wax.  Her son is with her today and says his been using Debrox and warm water to flush her right ear with no results.  She has worn hearing aids for several years and has been pleased with them.  She had recent cleaning of her hearing aids at dispenser and was told she needed to have her ears cleaned.    HPI  Review of Systems   HENT: Positive for hearing loss. Negative for ear discharge, ear pain and tinnitus.        Objective:     Physical Exam   HENT:   Right Ear: External ear and ear canal normal. Decreased hearing is noted.   Left Ear: External ear and ear canal normal. Decreased hearing is noted.   Bilateral complete cerumen impactions, removal described below       Procedure Note    CHIEF COMPLAINT:  Cerumen Impaction    Description:  The patient was seated in an exam chair.  An ear speculum was placed in the right EAC and was examined under the microscope.  Suction and/or loop curettes were used to remove a large cerumen impaction.  The tympanic membrane was visualized and was normal in appearance.  The ear canal is moist but does not look erythematous or inflamed.  The procedure was repeated on the left side in a similar fashion.  The TM was intact and normal on this side as well.  The patient tolerated the procedure well.          Assessment:     1. Bilateral impacted cerumen    2. Bilateral hearing loss, unspecified hearing loss type        Plan:     1.  Cerumen impaction:  Removed today without difficulty.  I would recommend the use of a wax softening drop, either over the  counter Debrox or mineral oil, on a weekly basis.  I also instructed the patient to avoid Qtips.  2.  Hearing loss:  Continue with hearing aids as tolerated.  Recommend annual audiograms and hearing protection around loud noises.  RTC only as needed.

## 2017-10-12 ENCOUNTER — PATIENT MESSAGE (OUTPATIENT)
Dept: FAMILY MEDICINE | Facility: CLINIC | Age: 82
End: 2017-10-12

## 2017-11-03 PROBLEM — I25.10 CORONARY ARTERY DISEASE INVOLVING NATIVE CORONARY ARTERY OF NATIVE HEART WITHOUT ANGINA PECTORIS: Chronic | Status: ACTIVE | Noted: 2017-09-12

## 2017-11-10 ENCOUNTER — OFFICE VISIT (OUTPATIENT)
Dept: INTERNAL MEDICINE | Facility: CLINIC | Age: 82
End: 2017-11-10
Payer: MEDICARE

## 2017-11-10 VITALS
BODY MASS INDEX: 26.26 KG/M2 | WEIGHT: 167.31 LBS | HEART RATE: 77 BPM | SYSTOLIC BLOOD PRESSURE: 124 MMHG | HEIGHT: 67 IN | TEMPERATURE: 99 F | DIASTOLIC BLOOD PRESSURE: 66 MMHG

## 2017-11-10 DIAGNOSIS — Z86.711 HISTORY OF PULMONARY EMBOLISM: ICD-10-CM

## 2017-11-10 DIAGNOSIS — Z79.01 LONG TERM CURRENT USE OF ANTICOAGULANT: ICD-10-CM

## 2017-11-10 DIAGNOSIS — J44.9 CHRONIC OBSTRUCTIVE PULMONARY DISEASE, UNSPECIFIED COPD TYPE: ICD-10-CM

## 2017-11-10 DIAGNOSIS — R91.1 LUNG NODULE: Chronic | ICD-10-CM

## 2017-11-10 DIAGNOSIS — E78.2 MIXED HYPERLIPIDEMIA: Chronic | ICD-10-CM

## 2017-11-10 DIAGNOSIS — Z00.00 ENCOUNTER FOR PREVENTIVE HEALTH EXAMINATION: Primary | ICD-10-CM

## 2017-11-10 DIAGNOSIS — I27.9 PULMONARY HEART DISEASE: Chronic | ICD-10-CM

## 2017-11-10 DIAGNOSIS — E04.2 MULTIPLE THYROID NODULES: ICD-10-CM

## 2017-11-10 DIAGNOSIS — F41.9 ANXIETY: ICD-10-CM

## 2017-11-10 DIAGNOSIS — H91.93 BILATERAL HEARING LOSS, UNSPECIFIED HEARING LOSS TYPE: ICD-10-CM

## 2017-11-10 DIAGNOSIS — I70.0 ATHEROSCLEROSIS OF AORTA: Chronic | ICD-10-CM

## 2017-11-10 PROCEDURE — G0439 PPPS, SUBSEQ VISIT: HCPCS | Mod: S$GLB,,, | Performed by: NURSE PRACTITIONER

## 2017-11-10 PROCEDURE — 99999 PR PBB SHADOW E&M-EST. PATIENT-LVL III: CPT | Mod: PBBFAC,,, | Performed by: NURSE PRACTITIONER

## 2017-11-10 NOTE — Clinical Note
HRA done today limited b/c I could not see immunization records for PNA. Scheduled eye exam since patient reports macular degeneration. Discussed lexapro dosing but son seems hesitant to try to wean back to 10 mg daily. Also scheduled her follow up with you for end of December.

## 2017-11-10 NOTE — PATIENT INSTRUCTIONS
Counseling and Referral of Other Preventative  (Italic type indicates deductible and co-insurance are waived)    Patient Name: Ashley Koenig  Today's Date: 11/10/2017      SERVICE LIMITATIONS RECOMMENDATION    Vaccines    · Pneumococcal (once after 65)    · Influenza (annually)    · Hepatitis B (if medium/high risk)    · Prevnar 13      Hepatitis B medium/high risk factors:       - End-stage renal disease       - Hemophiliacs who received Factor VII or         IX concentrates       - Clients of institutions for the mentally             retarded       - Persons who live in the same house as          a HepB carrier       - Homosexual men       - Illicit injectable drug abusers     Pneumococcal: unclear if given in [place]     Influenza: Done, repeat in one year     Hepatitis B: N/A     Prevnar 13: unclear if given in [place]    Mammogram (biennial age 50-74)  Annually (age 40 or over)  N/A    Pap (up to age 70 and after 70 if unknown history or abnormal study last 10 years)    N/A     The USPSTF recommends against screening for cervical cancer in women older than age 65 years who have had adequate prior screening and are not otherwise at high risk for cervical cancer.      Colorectal cancer screening (to age 75)    · Fecal occult blood test (annual)  · Flexible sigmoidoscopy (5y)  · Screening colonoscopy (10y)  · Barium enema   N/A    Diabetes self-management training (no USPSTF recommendations)  Requires referral by treating physician for patient with diabetes or renal disease. 10 hours of initial DSMT sessions of no less than 30 minutes each in a continuous 12-month period. 2 hours of follow-up DSMT in subsequent years.  N/A    Bone mass measurements (age 65 & older, biennial)  Requires diagnosis related to osteoporosis or estrogen deficiency. Biennial benefit unless patient has history of long-term glucocorticoid  N/A    Glaucoma screening (no USPSTF recommendation)  Diabetes mellitus, family history     American, age 50 or over    American, age 65 or over  Scheduled, see appointments    Medical nutrition therapy for diabetes or renal disease (no recommended schedule)  Requires referral by treating physician for patient with diabetes or renal disease or kidney transplant within the past 3 years.  Can be provided in same year as diabetes self-management training (DSMT), and CMS recommends medical nutrition therapy take place after DSMT. Up to 3 hours for initial year and 2 hours in subsequent years.  N/A    Cardiovascular screening blood tests (every 5 years)  · Fasting lipid panel  Order as a panel if possible  Done this year, repeat every year    Diabetes screening tests (at least every 3 years, Medicare covers annually or at 6-month intervals for prediabetic patients)  · Fasting blood sugar (FBS) or glucose tolerance test (GTT)  Patient must be diagnosed with one of the following:       - Hypertension       - Dyslipidemia       - Obesity (BMI 30kg/m2)       - Previous elevated impaired FBS or GTT       ... or any two of the following:       - Overweight (BMI 25 but <30)       - Family history of diabetes       - Age 65 or older       - History of gestational diabetes or birth of baby weighing more than 9 pounds  Done this year, repeat every year    HIV screening (annually for increased risk patients)  · HIV-1 and HIV-2 by EIA, or PARAG, rapid antibody test or oral mucosa transudate  Patients must be at increased risk for HIV infection per USPSTF guidelines or pregnant. Tests covered annually for patient at increased risk or as requested by the patient. Pregnant patients may receive up to 3 tests during pregnancy.  Risks discussed, screening is not recommended    Smoking cessation counseling (up to 8 sessions per year)  Patients must be asymptomatic of tobacco-related conditions to receive as a preventative service.  Non-smoker    Subsequent annual wellness visit  At least 12 months since last AWV  Return  in one year     The following information is provided to all patients.  This information is to help you find resources for any of the problems found today that may be affecting your health:                Living healthy guide: www.[facility].[place].[facility]      Understanding Diabetes: www.diabetes.org      Eating healthy: www.cdc.gov/healthyweight      CDC home safety checklist: www.cdc.gov/steadi/patient.html      Agency on Aging: www.goea.[place].[facility]      Alcoholics anonymous (AA): www.aa.org      Physical Activity: www.[place].nih.gov/vh4elvv      Tobacco use: www.quitwithusla.org

## 2017-11-10 NOTE — PROGRESS NOTES
"Ashley Koenig presented for a  Medicare AWV and comprehensive Health Risk Assessment today with her son, Mook, who provides history along with patient. The following components were reviewed and updated:    · Medical history  · Family History  · Social history  · Allergies and Current Medications  · Health Risk Assessment  · Health Maintenance  · Care Team     ** See Completed Assessments for Annual Wellness Visit within the encounter summary.**       The following assessments were completed:  · Living Situation  · CAGE  · Depression Screening  · Timed Get Up and Go  · Whisper Test  · Cognitive Function Screening  · Nutrition Screening  · ADL Screening  · PAQ Screening    Vitals:    11/10/17 1207   BP: 124/66   BP Location: Right arm   Pulse: 77   Temp: 98.6 °F (37 °C)   TempSrc: Tympanic   Weight: 75.9 kg (167 lb 5.3 oz)   Height: 5' 7" (1.702 m)     Body mass index is 26.21 kg/m².  Physical Exam   Constitutional: She appears well-developed and well-nourished. No distress.   HENT:   Head: Normocephalic and atraumatic.   Mouth/Throat: Abnormal dentition.   Eyes: Conjunctivae and EOM are normal. Pupils are equal, round, and reactive to light.   Cardiovascular: Normal rate and regular rhythm.  Exam reveals no gallop and no friction rub.    No murmur heard.  Pulmonary/Chest: Effort normal and breath sounds normal.   Abdominal: Soft. Bowel sounds are normal. She exhibits no distension. There is no tenderness.   Neurological: She is alert.   Skin: Skin is warm and dry.   Psychiatric: She has a normal mood and affect.   Vitals reviewed.        Diagnoses and health risks identified today and associated recommendations/orders:    1. Encounter for preventive health examination      2. Pulmonary heart disease  Care everywhere:Echo/[place]: Transthoracic echocardiogram- A slightly technically difficult study with poor echo windows. Normal left ventricular systolic function with EF 60% to 65%. Moderate right ventricular " enlargement with a flattened septum consistent with right ventricular pressure overload. Mild to moderate pulmonary hypertension with right ventricle systolic pressure fo approximately 45-50 mmHg. Morphologically normal valve mild to moderate aortic insufficiency, mild mitral regurgitation, and mild tricuspid regurgitation. 05/20/14   Patient has not established with cardiology since moving to [place]. Advised to follow up with PCP for further evaluation and treatment      3. Mixed hyperlipidemia  No lipids in chart. Awaiting records from prior PCP. Advised to follow up with PCP for further evaluation and treatment      4. Lung nodule  3/27/15 Care EverywhereCTA chest: Soft tissue nodule in the central right middle lobe lateral  segment measuring 14 x 9 mm with subtle spiculations, but rather low  soft tissue density. While the lesion could be inflammatory, the  possibility of a small bronchogenic neoplasm is considered. If the  lesion is new or prior studies are unavailable for comparison,  consider PET CT for further evaluation.  4.  Additional tubular opacity in the anterobasal right lower lobe  with peripheral tree-in-bud opacities is felt to be inflammatory,  specifically a focal mucocele.Soft tissue nodule in the central right middle lobe lateral  segment measuring 14 x 9 mm with subtle spiculations, but rather low  soft tissue density. While the lesion could be inflammatory, the  possibility of a small bronchogenic neoplasm is considered. If the  lesion is new or prior studies are unavailable for comparison,  consider PET CT for further evaluation.  4.  Additional tubular opacity in the anterobasal right lower lobe  with peripheral tree-in-bud opacities is felt to be inflammatory,  specifically a focal mucocele.    Has not established with new pulmonologist. Advised to follow up with PCP for further evaluation and treatment      5. Long term current use of anticoagulant  Was on warfarin for history of PE.  "Discontinued after heme/onc appointment in October 2017. Family gives 81 mg ASA daily. Continue current treatment plan as previously prescribed with your PCP.      6. History of pulmonary embolism  Unclear when PE occurred. Was on warfarin since at least 2014 and discontinued per Dr. Snyder last month. No acute concerns. Advised to follow up with PCP for further evaluation and treatment      7. Chronic obstructive pulmonary disease, unspecified COPD type  2/3/15 O2 sat 87% from [facility] per Care everywhere. Patient denies cough, SOB, or wheezing. Advised to follow up with PCP for further evaluation and treatment      8. Atherosclerosis of aorta  3/27/15 CTA pulm vessels Care everywhere: . There is  atherosclerotic change in the great vessels and thoracic aorta. ..... There is mild atherosclerotic change in the  upper abdominal aorta.  Education provided, this is not emergent. No TIA, new CVA, or claudication. Discussed control of BP, glucose, and lipids to avoid further damage to arterial wall. Advised to follow up with PCP for further evaluation and treatment      9. Anxiety  Per patient and son, this has been ongoing for more than 10 years. She has been stable on 20mg lexapro since starting medication. Son did attempt to wean patient to more geriatric appropriate dose of 10 mg daily but stated "it did not work". He reports that after two days she was very anxious and they resumed the 20 mg daily. Advised to follow up with PCP for further evaluation and treatment      10. Multiple thyroid nodules  Patient does not know about this diagnosis. TSH in 1/2016 was 2.02. Does not recall ever having a thyroid ultrasound. Advised to follow up with PCP for further evaluation and treatment      11. Bilateral hearing loss, unspecified hearing loss type  Wears hearing aids but having difficulty hearing. Son states hearing aids are under warranty and will contact them for possible repairs and/or adjustments. Advised to follow " up with PCP for further evaluation and treatment      Will assist with eye exam for macular degeneration.         Provided Ashley with a 5-10 year written screening schedule and personal prevention plan. Recommendations were developed using the USPSTF age appropriate recommendations. Education, counseling, and referrals were provided as needed. After Visit Summary printed and given to patient which includes a list of additional screenings\tests needed.    Return in about 1 month (around 12/10/2017), or if symptoms worsen or fail to improve, for with Dr. Ngo.    Devora Ngo NP

## 2017-12-11 ENCOUNTER — PATIENT MESSAGE (OUTPATIENT)
Dept: INTERNAL MEDICINE | Facility: CLINIC | Age: 82
End: 2017-12-11

## 2017-12-11 DIAGNOSIS — F41.1 GENERALIZED ANXIETY DISORDER: ICD-10-CM

## 2017-12-12 RX ORDER — ESCITALOPRAM OXALATE 20 MG/1
20 TABLET ORAL DAILY
Qty: 90 TABLET | Refills: 1 | Status: SHIPPED | OUTPATIENT
Start: 2017-12-12 | End: 2017-12-18 | Stop reason: SDUPTHER

## 2017-12-15 ENCOUNTER — OFFICE VISIT (OUTPATIENT)
Dept: OPHTHALMOLOGY | Facility: CLINIC | Age: 82
End: 2017-12-15
Payer: MEDICARE

## 2017-12-15 DIAGNOSIS — H52.4 BILATERAL PRESBYOPIA: ICD-10-CM

## 2017-12-15 DIAGNOSIS — H35.3134 NONEXUDATIVE AGE-RELATED MACULAR DEGENERATION, BILATERAL, ADVANCED ATROPHIC WITH SUBFOVEAL INVOLVEMENT: Primary | ICD-10-CM

## 2017-12-15 DIAGNOSIS — Z96.1 PSEUDOPHAKIA OF BOTH EYES: ICD-10-CM

## 2017-12-15 PROCEDURE — 99999 PR PBB SHADOW E&M-EST. PATIENT-LVL I: CPT | Mod: PBBFAC,,, | Performed by: OPTOMETRIST

## 2017-12-15 PROCEDURE — 92004 COMPRE OPH EXAM NEW PT 1/>: CPT | Mod: S$GLB,,, | Performed by: OPTOMETRIST

## 2017-12-15 PROCEDURE — 92015 DETERMINE REFRACTIVE STATE: CPT | Mod: S$GLB,,, | Performed by: OPTOMETRIST

## 2017-12-15 NOTE — LETTER
December 15, 2017      Devora Ngo NP  [address] [place] [address] [address] [place] [id number]           [facility]  [address] [place] [address] [place] [address]  Phone: [phone]  Fax: [phone]          Patient: Ashley Koenig   MR Number: [id number]   YOB: 1926   Date of Visit: 12/15/2017       Dear Devora Ngo:    Thank you for referring Ashley Koenig to me for evaluation. Attached you will find relevant portions of my assessment and plan of care.    If you have questions, please do not hesitate to call me. I look forward to following Ashley Koenig along with you.    Sincerely,    Ariel Drake, OD    Enclosure  CC:  No Recipients    If you would like to receive this communication electronically, please contact [email or web address] or [phone] to request more information on [employer] Link access.    For providers and/or their staff who would like to refer a patient to [facility], please contact us through our one-stop-shop provider referral line, Magali Munosn, at [phone].    If you feel you have received this communication in error or would no longer like to receive these types of communications, please e-mail [email or web address]

## 2017-12-15 NOTE — PROGRESS NOTES
HPI     Decrease near visual acuity hard to read small print. Hx of double vision   for a long time. New patient last eye exam 01/2016. History of macular   degeneration.    Last edited by Ariel Drake, OD on 12/15/2017  2:56 PM. (History)            Assessment /Plan     For exam results, see Encounter Report.    Nonexudative age-related macular degeneration, bilateral, advanced atrophic with subfoveal involvement    Pseudophakia of both eyes    Bilateral presbyopia      Stable dry mac degen.    Stable IOL OU    +3.00 readers.    RTC 6 months AMD ck with [facility].

## 2017-12-16 ENCOUNTER — PATIENT MESSAGE (OUTPATIENT)
Dept: INTERNAL MEDICINE | Facility: CLINIC | Age: 82
End: 2017-12-16

## 2017-12-16 DIAGNOSIS — F41.1 GENERALIZED ANXIETY DISORDER: ICD-10-CM

## 2017-12-18 ENCOUNTER — PATIENT OUTREACH (OUTPATIENT)
Dept: ADMINISTRATIVE | Facility: HOSPITAL | Age: 82
End: 2017-12-18

## 2017-12-19 RX ORDER — ESCITALOPRAM OXALATE 20 MG/1
20 TABLET ORAL DAILY
Qty: 90 TABLET | Refills: 0 | Status: SHIPPED | OUTPATIENT
Start: 2017-12-19 | End: 2018-08-09 | Stop reason: SDUPTHER

## 2018-01-11 ENCOUNTER — TELEPHONE (OUTPATIENT)
Dept: INTERNAL MEDICINE | Facility: CLINIC | Age: 83
End: 2018-01-11

## 2018-01-11 NOTE — TELEPHONE ENCOUNTER
Spoke with Елена(pharmacy tech) @ [facility], wanted to know if the dosage was correct  Notified pt was prescribed Lexapro (generic) 20 to take daily

## 2018-01-11 NOTE — TELEPHONE ENCOUNTER
----- Message from Cammie Darby sent at 1/11/2018  9:24 AM CST -----  Contact: [facility]  She is calling in regards to wanting to get clearance on a rxp.. Escitaloplam? 20mg clarify dosage  Reference number [id number]    .  [facility] - [place], [place] - [address]  [address] [address]  Phone: [phone] Fax: [phone]

## 2018-04-04 ENCOUNTER — PATIENT MESSAGE (OUTPATIENT)
Dept: INTERNAL MEDICINE | Facility: CLINIC | Age: 83
End: 2018-04-04

## 2018-08-09 DIAGNOSIS — F41.1 GENERALIZED ANXIETY DISORDER: ICD-10-CM

## 2018-08-10 RX ORDER — ESCITALOPRAM OXALATE 20 MG/1
TABLET ORAL
Qty: 90 TABLET | Refills: 0 | Status: SHIPPED | OUTPATIENT
Start: 2018-08-10 | End: 2018-11-02 | Stop reason: SDUPTHER

## 2018-08-10 NOTE — TELEPHONE ENCOUNTER
Patient needs appt.  She continues to be on high dose Lexapro.  We have tried to tritrate down and family reports her mood and anxiety worsens when we do so.    She needs a follow up Devora Loya or Me please.

## 2018-08-12 PROBLEM — H35.30 MACULAR DEGENERATION: Status: ACTIVE | Noted: 2018-08-12

## 2018-08-12 PROBLEM — Z79.01 LONG TERM CURRENT USE OF ANTICOAGULANT: Status: RESOLVED | Noted: 2017-09-12 | Resolved: 2018-08-12

## 2018-08-12 PROBLEM — I51.89 DIASTOLIC DYSFUNCTION: Status: ACTIVE | Noted: 2018-08-12

## 2018-08-12 PROBLEM — H81.10 BPPV (BENIGN PAROXYSMAL POSITIONAL VERTIGO): Status: ACTIVE | Noted: 2018-08-12

## 2018-08-12 PROBLEM — E78.5 HYPERLIPIDEMIA: Status: ACTIVE | Noted: 2017-11-10

## 2018-08-12 PROBLEM — H91.90 HEARING LOSS: Status: ACTIVE | Noted: 2018-08-12

## 2018-08-12 NOTE — PROGRESS NOTES
Ashley Koenig Answers for HPI/ROS submitted by the patient on 8/13/2018   activity change: No  unexpected weight change: No  rhinorrhea: No  trouble swallowing: No  visual disturbance: No  chest tightness: No  polyuria: No  difficulty urinating: No  menstrual problem: No  joint swelling: No  arthralgias: No  confusion: Yes  dysphoric mood: Yes    08/13/2018  [id number]    Maggy Ngo MD  Patient Care Team:  Maggy Ngo MD as PCP - General (Family Medicine)  Fadumo Crawford MD as PCP - Ghulam ADAIR/Brandin Snyder MD as Consulting Physician (Hematology and Oncology)  Has the patient seen any provider outside of the [facility] network since the last visit? (yes). If yes, HIPPA forms completed and records requested.        Visit Type:a scheduled routine follow-up visit    Chief Complaint:  No chief complaint on file.      History of Present Illness:  She initially established care with Dr. Elena Irizarry. Last appt with her was on 9/12.    She gave history of Anxiey and panic disorder. She has been maintained on Lexapro 20 mg and prn use of xanax.  Patient reports that she will take 1/2 table of Xanax when needed. She lives with her son, after relocating from [place]. We have tried to decrease her Lexapro to 10 mg, and she has not tolerated, as she has baseline anxiety and when we decrease the dose family reports her anxiety increases. We have not had to escelate her Benzo when we keep her Lexapro at 20 mg.  Patient family counseled on dose of Lexapro in elderly and understand risk benefit of medication. Currently mood is stable on Lexapro 20.     She did see Dr. Snyder in consult. She came to Dr. Parker on Coumadin for a PE, but had been on this reported for year. Denies falls or bleeding risk. He has continued her on the Coumadin until more records could be obtained. Dr. Snyder advised with her gait disturbance and advacned age we use ASA daily for her anticoag and stop coumadin.       She has a remote history of meningioma, but reports decided against future survillence due to advanced age and stablity.     History of COPD, but not on any medication since relocating.   She has denied SOB or Cough.  She has history of diastolic dysfunction and BP and fluid status has remained stable per family reports.   Last echo seen in 2014 in [place].  SHe had a history of PE, wihtout any identified hypercoag state. It was felt due to risk that she stop coumadin and take ASA daily. SHe had pulm strain after PE and was considered to see PUlm by [facility]. I do not see that she did this prior to relocating.    I saw her for the visit last to get her Lexapro filled. She has not c/o SOB or edema.  I actually asked family to bring her in today for recheck as I had not seen her in over 6 months.    She has seen Audiology. Has hearing loss, has hearing aides.  Wax build up has been a problem.        History:  Past Medical History:   Diagnosis Date    Age-related macular degeneration, wet, both eyes     Anginal equivalent     Anxiety     Arthritis     BPPV (benign paroxysmal positional vertigo)     Brain tumor     Clotting disorder     lung    COPD (chronic obstructive pulmonary disease)     Depression     Diastolic dysfunction     Hearing loss     Heart disease     Hyperlipidemia     Hypertension     Hypoxemia     Lung nodule     Meningioma     Multiple thyroid nodules     Pulmonary embolism     Pulmonary hypertension     Sleep apnea in adult     Stroke     per eye exam, never been treated for    Urinary incontinence     Vitamin D deficiency      Past Surgical History:   Procedure Laterality Date    CATARACT EXTRACTION Bilateral     TOTAL HIP ARTHROPLASTY Right 08/20/2016    aafter fx     Family History   Problem Relation Age of Onset    Alzheimer's disease Father     Alzheimer's disease Daughter     No Known Problems Son      Social History     Socioeconomic History    Marital status:       Spouse name: Not on file    Number of children: Not on file    Years of education: Not on file    Highest education level: Not on file   Social Needs    Financial resource strain: Not on file    Food insecurity - worry: Not on file    Food insecurity - inability: Not on file    Transportation needs - medical: Not on file    Transportation needs - non-medical: Not on file   Occupational History    Not on file   Tobacco Use    Smoking status: Never Smoker    Smokeless tobacco: Never Used   Substance and Sexual Activity    Alcohol use: No    Drug use: No    Sexual activity: Not Currently   Other Topics Concern    Not on file   Social History Narrative    Patient recently moved from [place] to [place] in August 2017. She had previously lived in [place] her whole life. , 5 children (4 alive). Son Mook has medical POA. Does not drive.      Patient Active Problem List   Diagnosis    Meningioma    History of pulmonary embolism    Coronary artery disease involving native coronary artery of native heart without angina pectoris    Lung nodule    COPD (chronic obstructive pulmonary disease)    Multiple thyroid nodules    Anxiety    Atherosclerosis of aorta    Hyperlipidemia    Pulmonary heart disease    Diastolic dysfunction    HTN (hypertension)    Macular degeneration    Vitamin D deficiency    BPPV (benign paroxysmal positional vertigo)    Hearing loss     Review of patient's allergies indicates:  No Known Allergies    The following were reviewed at this visit: active problem list, medication list, allergies, family history, social history, and health maintenance.    Medications:  Current Outpatient Medications on File Prior to Visit   Medication Sig Dispense Refill    alprazolam (XANAX) 0.25 MG tablet Take 0.25 mg by mouth 2 (two) times daily as needed for Anxiety.      escitalopram oxalate (LEXAPRO) 20 MG tablet TAKE 1 TABLET EVERY DAY 90 tablet 0    hydrOXYzine HCl  (ATARAX) 25 MG tablet Take 25 mg by mouth 3 (three) times daily as needed for Itching.      MULTIVITAMIN,THER AND MINERALS (VITAMINS AND MINERALS ORAL) Take by mouth once daily.       No current facility-administered medications on file prior to visit.        Medications have been reviewed and reconciled with patient at this visit.  Barriers to medications present (no)    Adverse reactions to current medications (no)    Over the counter medications reviewed (Yes ), and if needed added to active Medication list at this visit.     Exam:  Wt Readings from Last 3 Encounters:   11/10/17 75.9 kg (167 lb 5.3 oz)   10/11/17 72.2 kg (159 lb 2.8 oz)   10/10/17 72 kg (158 lb 11.7 oz)     Temp Readings from Last 3 Encounters:   11/10/17 98.6 °F (37 °C) (Tympanic)   10/11/17 99.3 °F (37.4 °C) (Tympanic)   10/10/17 98.7 °F (37.1 °C) (Oral)     BP Readings from Last 3 Encounters:   11/10/17 124/66   10/11/17 118/62   10/10/17 106/60     Pulse Readings from Last 3 Encounters:   11/10/17 77   10/11/17 72   10/10/17 65     There is no height or weight on file to calculate BMI.    Review of Systems   Constitutional: Negative.  Negative for chills and fever.   HENT: Negative.  Negative for congestion, sinus pain and sore throat.    Eyes: Negative for blurred vision and double vision.   Respiratory: Negative for cough, sputum production, shortness of breath and wheezing.    Cardiovascular: Negative for chest pain, palpitations and leg swelling.   Gastrointestinal: Negative for abdominal pain, constipation, diarrhea, heartburn, nausea and vomiting.   Genitourinary: Negative.    Musculoskeletal: Negative.    Skin: Negative.  Negative for rash.   Neurological: Negative.    Endo/Heme/Allergies: Negative.  Negative for polydipsia. Does not bruise/bleed easily.   Psychiatric/Behavioral: Negative for depression and substance abuse. The patient is nervous/anxious.          Physical Exam   Constitutional: She is oriented to person, place, and  time. She appears well-developed and well-nourished. No distress.   HENT:   Head: Normocephalic and atraumatic.   Right Ear: External ear normal.   Left Ear: External ear normal.   Nose: Nose normal.   Mouth/Throat: Oropharynx is clear and moist. No oropharyngeal exudate.   Eyes: Conjunctivae and EOM are normal. Pupils are equal, round, and reactive to light. Right eye exhibits no discharge. Left eye exhibits no discharge.   Neck: Normal range of motion. Neck supple. No thyromegaly present.   Cardiovascular: Normal rate, regular rhythm, normal heart sounds and intact distal pulses.   No murmur heard.  Pulmonary/Chest: Effort normal and breath sounds normal. No respiratory distress. She has no wheezes.   Abdominal: Soft. Bowel sounds are normal. She exhibits no distension and no mass. There is no tenderness.   Musculoskeletal: Normal range of motion. She exhibits no edema.   Lymphadenopathy:     She has no cervical adenopathy.   Neurological: She is alert and oriented to person, place, and time. No cranial nerve deficit.   Skin: Capillary refill takes less than 2 seconds. She is not diaphoretic.   Psychiatric: She has a normal mood and affect. Her behavior is normal. Judgment and thought content normal.   Nursing note and vitals reviewed.      Laboratory Reviewed ({Yes)  Lab Results   Component Value Date    WBC 11.99 09/12/2017    HGB 15.4 09/12/2017    HCT 47.1 09/12/2017     (H) 09/12/2017    ALT 20 09/12/2017    AST 22 09/12/2017     09/12/2017    K 4.4 09/12/2017     09/12/2017    CREATININE 0.8 09/12/2017    BUN 25 09/12/2017    CO2 31 (H) 09/12/2017    TSH 2.229 09/12/2017    INR 2.3 (H) 09/12/2017       Assessment:  The primary encounter diagnosis was Annual physical exam. Diagnoses of Essential hypertension, History of pulmonary embolism, Anxiety, and Need for pneumococcal vaccination were also pertinent to this visit.     Plan     Annual physical exam  -     CBC auto differential; Future;  Expected date: 08/13/2018  -     Comprehensive metabolic panel; Future; Expected date: 08/13/2018  -     TSH; Future; Expected date: 08/13/2018    Essential hypertension  History of pulmonary embolism  Anxiety   Lexapro, try to take 1/2 Lexapro again to see if she can tolerate and her anxiety stays the same.    Need for pneumococcal vaccination  -     (In Office Administered) Pneumococcal Conjugate Vaccine (13 Valent) (IM)      Care Plan/Goals: Reviewed  (N/A)  Goals     None          Follow up: No Follow-up on file.    After visit summary was printed and given to patient upon discharge today.  Patient goals and care plan are included in After Visit Summary.

## 2018-08-13 ENCOUNTER — OFFICE VISIT (OUTPATIENT)
Dept: INTERNAL MEDICINE | Facility: CLINIC | Age: 83
End: 2018-08-13
Payer: MEDICARE

## 2018-08-13 ENCOUNTER — LAB VISIT (OUTPATIENT)
Dept: LAB | Facility: HOSPITAL | Age: 83
End: 2018-08-13
Attending: FAMILY MEDICINE
Payer: MEDICARE

## 2018-08-13 VITALS
DIASTOLIC BLOOD PRESSURE: 68 MMHG | TEMPERATURE: 98 F | WEIGHT: 177.5 LBS | BODY MASS INDEX: 27.86 KG/M2 | OXYGEN SATURATION: 93 % | HEART RATE: 65 BPM | HEIGHT: 67 IN | SYSTOLIC BLOOD PRESSURE: 130 MMHG

## 2018-08-13 DIAGNOSIS — F41.9 ANXIETY: ICD-10-CM

## 2018-08-13 DIAGNOSIS — Z00.00 ANNUAL PHYSICAL EXAM: ICD-10-CM

## 2018-08-13 DIAGNOSIS — I10 ESSENTIAL HYPERTENSION: ICD-10-CM

## 2018-08-13 DIAGNOSIS — Z86.711 HISTORY OF PULMONARY EMBOLISM: ICD-10-CM

## 2018-08-13 DIAGNOSIS — Z00.00 ANNUAL PHYSICAL EXAM: Primary | ICD-10-CM

## 2018-08-13 DIAGNOSIS — Z23 NEED FOR PNEUMOCOCCAL VACCINATION: ICD-10-CM

## 2018-08-13 LAB
ALBUMIN SERPL BCP-MCNC: 4 G/DL
ALP SERPL-CCNC: 109 U/L
ALT SERPL W/O P-5'-P-CCNC: 19 U/L
ANION GAP SERPL CALC-SCNC: 8 MMOL/L
AST SERPL-CCNC: 21 U/L
BASOPHILS # BLD AUTO: 0.09 K/UL
BASOPHILS NFR BLD: 0.8 %
BILIRUB SERPL-MCNC: 0.6 MG/DL
BUN SERPL-MCNC: 27 MG/DL
CALCIUM SERPL-MCNC: 10.2 MG/DL
CHLORIDE SERPL-SCNC: 101 MMOL/L
CO2 SERPL-SCNC: 33 MMOL/L
CREAT SERPL-MCNC: 0.8 MG/DL
DIFFERENTIAL METHOD: ABNORMAL
EOSINOPHIL # BLD AUTO: 0.3 K/UL
EOSINOPHIL NFR BLD: 2.2 %
ERYTHROCYTE [DISTWIDTH] IN BLOOD BY AUTOMATED COUNT: 14.2 %
EST. GFR  (AFRICAN AMERICAN): >60 ML/MIN/1.73 M^2
EST. GFR  (NON AFRICAN AMERICAN): >60 ML/MIN/1.73 M^2
GLUCOSE SERPL-MCNC: 82 MG/DL
HCT VFR BLD AUTO: 47.1 %
HGB BLD-MCNC: 14.5 G/DL
IMM GRANULOCYTES # BLD AUTO: 0.1 K/UL
IMM GRANULOCYTES NFR BLD AUTO: 0.8 %
LYMPHOCYTES # BLD AUTO: 1.8 K/UL
LYMPHOCYTES NFR BLD: 15.2 %
MCH RBC QN AUTO: 33.9 PG
MCHC RBC AUTO-ENTMCNC: 30.8 G/DL
MCV RBC AUTO: 110 FL
MONOCYTES # BLD AUTO: 0.9 K/UL
MONOCYTES NFR BLD: 7.2 %
NEUTROPHILS # BLD AUTO: 8.7 K/UL
NEUTROPHILS NFR BLD: 73.8 %
NRBC BLD-RTO: 0 /100 WBC
PLATELET # BLD AUTO: 445 K/UL
PMV BLD AUTO: 9.9 FL
POTASSIUM SERPL-SCNC: 4.7 MMOL/L
PROT SERPL-MCNC: 7.3 G/DL
RBC # BLD AUTO: 4.28 M/UL
SODIUM SERPL-SCNC: 142 MMOL/L
TSH SERPL DL<=0.005 MIU/L-ACNC: 2.08 UIU/ML
WBC # BLD AUTO: 11.79 K/UL

## 2018-08-13 PROCEDURE — G0009 ADMIN PNEUMOCOCCAL VACCINE: HCPCS | Mod: S$GLB,,, | Performed by: FAMILY MEDICINE

## 2018-08-13 PROCEDURE — 99214 OFFICE O/P EST MOD 30 MIN: CPT | Mod: 25,S$GLB,, | Performed by: FAMILY MEDICINE

## 2018-08-13 PROCEDURE — 90670 PCV13 VACCINE IM: CPT | Mod: S$GLB,,, | Performed by: FAMILY MEDICINE

## 2018-08-13 PROCEDURE — 84443 ASSAY THYROID STIM HORMONE: CPT

## 2018-08-13 PROCEDURE — 80053 COMPREHEN METABOLIC PANEL: CPT

## 2018-08-13 PROCEDURE — 99999 PR PBB SHADOW E&M-EST. PATIENT-LVL III: CPT | Mod: PBBFAC,,, | Performed by: FAMILY MEDICINE

## 2018-08-13 PROCEDURE — 36415 COLL VENOUS BLD VENIPUNCTURE: CPT

## 2018-08-13 PROCEDURE — 85025 COMPLETE CBC W/AUTO DIFF WBC: CPT

## 2018-08-13 NOTE — PATIENT INSTRUCTIONS
Treating Anxiety Disorders with Therapy    If you have an anxiety disorder, you dont have to suffer anymore. Treatment is available. Therapy (also called counseling) is often a helpful treatment for anxiety disorders. With therapy, a specially trained professional (therapist) helps you face and learn to manage your anxiety. Therapy can be short-term or long-term depending on your needs. In some cases, medicine may also be prescribed with therapy. It may take time before you notice how much therapy is helping, but stick with it. With therapy, you can feel better.  Cognitive behavioral therapy (CBT)  Cognitive behavioral therapy (CBT) teaches you to manage anxiety. It does this by helping you understand how you think and act when youre anxious. Research has shown CBT to be a very effective treatment for anxiety disorders. How CBT is run is almost like a class. It involves homework and activities to build skills that teach you to cope with anxiety step by step. It can be done in a group or one-on-one, and often takes place for a set number of sessions. CBT has two main parts:  · Cognitive therapy helps you identify the negative, irrational thoughts that occur with your anxiety. Youll learn to replace these with more positive, realistic thoughts.  · Behavioral therapy helps you change how you react to anxiety. Youll learn coping skills and methods for relaxing to help you better deal with anxiety.  Other forms of therapy  Other therapy methods may work better for you than CBT. Or, you may move from CBT to another form of therapy as your treatment needs change. This may mean meeting with a therapist by yourself or in a group. Therapy can also help you work through problems in your life, such as drug or alcohol dependence, that may be making your anxiety worse.  Getting better takes time  Therapy will help you feel better and teach you skills to help manage anxiety long term. But change doesnt happen right away. It  takes a commitment from you. And treatment only works if you learn to face the causes of your anxiety. So, you might feel worse before you feel better. This can sometimes make it hard to stick with it. But remember: Therapy is a very effective treatment. The results will be well worth it.  Helping yourself  If anxiety is wearing you down, here are some things you can do to cope:  · Check with your doctor and rule out any physical problems that may be causing the anxiety symptoms.  · If an anxiety disorder is diagnosed, seek mental healthcare. This is an illness and it can respond to treatment. Most types of anxiety disorders will respond to talk therapy and medicine.  · Educate yourself about anxiety disorders. Keep track of helpful online resources and books you can use during stressful periods.  · Try stress management techniques such as meditation.  · Consider online or in-person support groups.  · Dont fight your feelings. Anxiety feeds itself. The more you worry about it, the worse it gets. Instead, try to identify what might have triggered your anxiety. Then try to put this threat in perspective.  · Keep in mind that you cant control everything about a situation. Change what you can and let the rest take its course.  · Exercise -- its a great way to relieve tension and help your body feel relaxed.  · Examine your life for stress, and try to find ways to reduce it.  · Avoid caffeine and nicotine, which can make anxiety symptoms worse.  · Fight the temptation to turn to alcohol or unprescribed drugs for relief. They only make things worse in the long run.   Date Last Reviewed: 1/1/2017  © 1037-9342 [employer]. [address] [place] [address], [place] [address]. All rights reserved. This information is not intended as a substitute for professional medical care. Always follow your healthcare professional's instructions.

## 2018-08-14 ENCOUNTER — PATIENT MESSAGE (OUTPATIENT)
Dept: INTERNAL MEDICINE | Facility: CLINIC | Age: 83
End: 2018-08-14

## 2018-08-14 DIAGNOSIS — F03.91 DEMENTIA WITH BEHAVIORAL DISTURBANCE, UNSPECIFIED DEMENTIA TYPE: ICD-10-CM

## 2018-08-14 DIAGNOSIS — R79.89 ABNORMAL CBC: ICD-10-CM

## 2018-08-14 DIAGNOSIS — R79.89 OTHER SPECIFIED ABNORMAL FINDINGS OF BLOOD CHEMISTRY: ICD-10-CM

## 2018-08-14 DIAGNOSIS — D75.89 MACROCYTOSIS: Primary | ICD-10-CM

## 2018-08-14 DIAGNOSIS — R71.8 ABNORMAL RED BLOOD CELLS: ICD-10-CM

## 2018-08-14 DIAGNOSIS — R71.8 ELEVATED MCV: ICD-10-CM

## 2018-08-14 NOTE — PROGRESS NOTES
Patient has no anemia, however her MCV is elevated.    I need to screen her for B12 and Folate deficiency as well as do a peripheral smear.    Sometimes vitamin def can cause this, however in the older population myelodysplastic syndrome can cause this, when the bone marrow just doesn't work as well.     I will order the follow up labs, can complete as lab visit only, Peripheral smear, b12 and folate.  I will notify them as results return

## 2018-08-20 ENCOUNTER — OFFICE VISIT (OUTPATIENT)
Dept: OTOLARYNGOLOGY | Facility: CLINIC | Age: 83
End: 2018-08-20
Payer: MEDICARE

## 2018-08-20 VITALS
BODY MASS INDEX: 27.38 KG/M2 | HEART RATE: 70 BPM | TEMPERATURE: 99 F | DIASTOLIC BLOOD PRESSURE: 60 MMHG | WEIGHT: 174.81 LBS | SYSTOLIC BLOOD PRESSURE: 127 MMHG

## 2018-08-20 DIAGNOSIS — H91.93 BILATERAL HEARING LOSS, UNSPECIFIED HEARING LOSS TYPE: ICD-10-CM

## 2018-08-20 DIAGNOSIS — H61.23 BILATERAL IMPACTED CERUMEN: Primary | ICD-10-CM

## 2018-08-20 PROCEDURE — 69210 REMOVE IMPACTED EAR WAX UNI: CPT | Mod: S$GLB,,, | Performed by: PHYSICIAN ASSISTANT

## 2018-08-20 PROCEDURE — 99213 OFFICE O/P EST LOW 20 MIN: CPT | Mod: 25,S$GLB,, | Performed by: PHYSICIAN ASSISTANT

## 2018-08-20 PROCEDURE — 99999 PR PBB SHADOW E&M-EST. PATIENT-LVL III: CPT | Mod: PBBFAC,,, | Performed by: PHYSICIAN ASSISTANT

## 2018-08-20 RX ORDER — MULTIVIT WITH IRON,MINERALS
100 TABLET ORAL NIGHTLY
COMMUNITY
End: 2019-07-09

## 2018-08-20 NOTE — PROGRESS NOTES
Ob f/u. + fetal Subjective:       Patient ID: Ashley Koenig is a 89 y.o. female.    Chief Complaint: Consult and Ear Fullness    Patient is here to see me today for evaluation of a possible wax impaction in bilateral ears.  She has complaints of hearing loss in the affected ears, but denies pain or drainage.  This has been an issue in the past.  The patient has not been using any sort of ear drop to soften the wax. She wears HA at baseline.       Review of Systems   Constitutional: Negative for chills, fatigue, fever and unexpected weight change.   HENT: Positive for hearing loss. Negative for congestion, dental problem, ear discharge, ear pain, facial swelling, nosebleeds, postnasal drip, rhinorrhea, sinus pressure, sneezing, sore throat, tinnitus, trouble swallowing and voice change.    Eyes: Negative for redness, itching and visual disturbance.   Respiratory: Negative for cough, choking, shortness of breath and wheezing.    Cardiovascular: Negative for chest pain and palpitations.   Gastrointestinal: Negative for abdominal pain.        No reflux.   Musculoskeletal: Negative for gait problem.   Skin: Negative for rash.   Neurological: Negative for dizziness, light-headedness and headaches.       Objective:      Physical Exam   Constitutional: She is oriented to person, place, and time. She appears well-developed and well-nourished. No distress.   HENT:   Head: Normocephalic and atraumatic.   Right Ear: Tympanic membrane, external ear and ear canal normal.   Left Ear: Tympanic membrane, external ear and ear canal normal.   Nose: Nose normal. No mucosal edema, rhinorrhea, nasal deformity or septal deviation. No epistaxis. Right sinus exhibits no maxillary sinus tenderness and no frontal sinus tenderness. Left sinus exhibits no maxillary sinus tenderness and no frontal sinus tenderness.   Mouth/Throat: Uvula is midline, oropharynx is clear and moist and mucous membranes are normal. Mucous membranes are not pale and not dry. No  dental caries. No oropharyngeal exudate or posterior oropharyngeal erythema.   Cerumen impaction bilaterally   Eyes: Conjunctivae, EOM and lids are normal. Pupils are equal, round, and reactive to light. Right eye exhibits no chemosis. Left eye exhibits no chemosis. Right conjunctiva is not injected. Left conjunctiva is not injected. No scleral icterus. Right eye exhibits normal extraocular motion and no nystagmus. Left eye exhibits normal extraocular motion and no nystagmus.   Neck: Trachea normal and phonation normal. No tracheal tenderness present. No tracheal deviation present. No thyroid mass and no thyromegaly present.   Cardiovascular: Intact distal pulses.   Pulmonary/Chest: Effort normal. No stridor. No respiratory distress.   Abdominal: She exhibits no distension.   Lymphadenopathy:        Head (right side): No submental, no submandibular, no preauricular, no posterior auricular and no occipital adenopathy present.        Head (left side): No submental, no submandibular, no preauricular, no posterior auricular and no occipital adenopathy present.     She has no cervical adenopathy.   Neurological: She is alert and oriented to person, place, and time. No cranial nerve deficit.   Skin: Skin is warm and dry. No rash noted. No erythema.   Psychiatric: She has a normal mood and affect. Her behavior is normal.         Procedure Note    CHIEF COMPLAINT:  Cerumen Impaction    Description:  The patient was seated in an exam chair.  An ear speculum was placed in the right EAC and was examined under the microscope.  Suction and/or loop curettes were used to remove a large cerumen impaction.  The tympanic membrane was visualized and was normal in appearance.  The procedure was repeated on the left side in a similar fashion.  The TM was intact and normal on this side as well.  The patient tolerated the procedure well.  Assessment:       1. Bilateral impacted cerumen    2. Bilateral hearing loss, unspecified hearing  loss type        Plan:        Cerumen impaction:  Removed today without difficulty.  I would recommend the use of a wax softening drop, either over the counter Debrox or mineral oil, on a weekly basis.  I also instructed the patient to avoid Qtips.    Hearing Loss: continue HA and follow up with audiology as scheduled

## 2018-09-28 ENCOUNTER — LAB VISIT (OUTPATIENT)
Dept: LAB | Facility: HOSPITAL | Age: 83
End: 2018-09-28
Payer: MEDICARE

## 2018-09-28 DIAGNOSIS — R71.8 ELEVATED MCV: ICD-10-CM

## 2018-09-28 DIAGNOSIS — D75.89 MACROCYTOSIS: ICD-10-CM

## 2018-09-28 DIAGNOSIS — R71.8 ABNORMAL RED BLOOD CELLS: ICD-10-CM

## 2018-09-28 DIAGNOSIS — R79.89 ABNORMAL CBC: ICD-10-CM

## 2018-09-28 DIAGNOSIS — F03.91 DEMENTIA WITH BEHAVIORAL DISTURBANCE, UNSPECIFIED DEMENTIA TYPE: ICD-10-CM

## 2018-09-28 DIAGNOSIS — R79.89 OTHER SPECIFIED ABNORMAL FINDINGS OF BLOOD CHEMISTRY: ICD-10-CM

## 2018-09-28 LAB
FOLATE SERPL-MCNC: 32.4 NG/ML
VIT B12 SERPL-MCNC: 784 PG/ML

## 2018-09-28 PROCEDURE — 36415 COLL VENOUS BLD VENIPUNCTURE: CPT

## 2018-09-28 PROCEDURE — 82607 VITAMIN B-12: CPT

## 2018-09-28 PROCEDURE — 82746 ASSAY OF FOLIC ACID SERUM: CPT

## 2018-10-25 ENCOUNTER — OFFICE VISIT (OUTPATIENT)
Dept: INTERNAL MEDICINE | Facility: CLINIC | Age: 83
End: 2018-10-25
Payer: MEDICARE

## 2018-10-25 VITALS
TEMPERATURE: 98 F | SYSTOLIC BLOOD PRESSURE: 102 MMHG | BODY MASS INDEX: 27.71 KG/M2 | OXYGEN SATURATION: 93 % | HEART RATE: 85 BPM | WEIGHT: 176.56 LBS | HEIGHT: 67 IN | DIASTOLIC BLOOD PRESSURE: 60 MMHG

## 2018-10-25 DIAGNOSIS — Z23 NEED FOR INFLUENZA VACCINATION: ICD-10-CM

## 2018-10-25 DIAGNOSIS — H91.90 HEARING LOSS, UNSPECIFIED HEARING LOSS TYPE, UNSPECIFIED LATERALITY: ICD-10-CM

## 2018-10-25 DIAGNOSIS — F41.9 ANXIETY: Primary | ICD-10-CM

## 2018-10-25 PROBLEM — I27.9 PULMONARY HEART DISEASE: Chronic | Status: RESOLVED | Noted: 2017-11-10 | Resolved: 2018-10-25

## 2018-10-25 PROCEDURE — 99999 PR PBB SHADOW E&M-EST. PATIENT-LVL IV: CPT | Mod: PBBFAC,,, | Performed by: FAMILY MEDICINE

## 2018-10-25 PROCEDURE — 99213 OFFICE O/P EST LOW 20 MIN: CPT | Mod: S$PBB,,, | Performed by: FAMILY MEDICINE

## 2018-10-25 PROCEDURE — 90662 IIV NO PRSV INCREASED AG IM: CPT | Mod: PBBFAC

## 2018-10-25 PROCEDURE — 1101F PT FALLS ASSESS-DOCD LE1/YR: CPT | Mod: CPTII,,, | Performed by: FAMILY MEDICINE

## 2018-10-25 PROCEDURE — 99214 OFFICE O/P EST MOD 30 MIN: CPT | Mod: PBBFAC,25 | Performed by: FAMILY MEDICINE

## 2018-10-25 NOTE — PROGRESS NOTES
Ashley Koenig  10/25/2018  [id number]    Maggy Ngo MD  Patient Care Team:  Maggy Ngo MD as PCP - General (Family Medicine)  Fadumo Crawford MD as PCP - Ghulam ADAIR/Brandin Attributed  Anoop Snyder MD as Consulting Physician (Hematology and Oncology)  Luz Marina Mccoy LPN as Care Coordinator (Internal Medicine)  Has the patient seen any provider outside of the [facility] network since the last visit? (no). If yes, HIPPA forms completed and records requested.        Visit Type:a scheduled visit follow up    Chief Complaint:  No chief complaint on file.      History of Present Illness:  89 year old here for check in and flu shot.    She initially established care with Dr. Elena Irizarry. Last appt with her was on August of 2018.    She gave history of Anxiey and panic disorder. She has been maintained on Lexapro 20 mg and prn use of xanax.  Patient reports that she will take 1/2 table of Xanax when needed. She lives with her son, after relocating from [place]. We have tried to decrease her Lexapro to 10 mg, and she has not tolerated, as she has baseline anxiety and when we decrease the dose family reports her anxiety increases. We have not had to escelate her Benzo when we keep her Lexapro at 20 mg.  Patient family counseled on dose of Lexapro in elderly and understand risk benefit of medication. Currently mood is stable on Lexapro 20. Family wishes to remain on this dose.      She did see Dr. Snyder in consult. She came to Dr. Parker on Coumadin for a PE, but had been on this reported for year. Denies falls or bleeding risk. Dr. Snyder advised with her gait disturbance and advacned age we use ASA daily for her anticoag and stop coumadin.      She has a remote history of meningioma, but reports decided against future survillence due to advanced age and stablity.     History of COPD, but not on any medication since relocating.   She has denied SOB or Cough.  She has history of diastolic  dysfunction and BP and fluid status has remained stable per family reports.   Last echo seen in 2014 in [place].  SHe had a history of PE, wihtout any identified hypercoag state. It was felt due to risk that she stop coumadin and take ASA daily. SHe had pulm strain after PE and was considered to see PUlm by [facility]. I do not see that she did this prior to relocating.     I saw her for the visit last to get her Lexapro filled. She has not c/o SOB or edema.  I actually asked family to bring her in today for recheck as I had not seen her in over 6 months. They had reported besides the anxiety and dementia she has been doing fine.      She has seen Audiology. Has hearing loss, has hearing aides.  Wax build up has been a problem.  Son reports some continued hearing loss.               History:  Past Medical History:   Diagnosis Date    Age-related macular degeneration, wet, both eyes     Anginal equivalent     Anxiety     Arthritis     BPPV (benign paroxysmal positional vertigo)     Brain tumor     Clotting disorder     lung    COPD (chronic obstructive pulmonary disease)     Depression     Diastolic dysfunction     Hearing loss     Heart disease     Hyperlipidemia     Hypertension     Hypoxemia     Lung nodule     Meningioma     Multiple thyroid nodules     Pulmonary embolism     Pulmonary hypertension     Sleep apnea in adult     Stroke     per eye exam, never been treated for    Urinary incontinence     Vitamin D deficiency      Past Surgical History:   Procedure Laterality Date    CATARACT EXTRACTION Bilateral     TOTAL HIP ARTHROPLASTY Right 08/20/2016    aafter fx     Family History   Problem Relation Age of Onset    Alzheimer's disease Father     Alzheimer's disease Daughter     No Known Problems Son      Social History     Socioeconomic History    Marital status:      Spouse name: Not on file    Number of children: Not on file    Years of education: Not on file    Highest  education level: Not on file   Social Needs    Financial resource strain: Not on file    Food insecurity - worry: Not on file    Food insecurity - inability: Not on file    Transportation needs - medical: Not on file    Transportation needs - non-medical: Not on file   Occupational History    Not on file   Tobacco Use    Smoking status: Never Smoker    Smokeless tobacco: Never Used   Substance and Sexual Activity    Alcohol use: No    Drug use: No    Sexual activity: Not Currently   Other Topics Concern    Not on file   Social History Narrative    Patient recently moved from [place] to [place] in August 2017. She had previously lived in [place] her whole life. , 5 children (4 alive). Son Mook has medical POA. Does not drive.      Patient Active Problem List   Diagnosis    Meningioma    History of pulmonary embolism    Coronary artery disease involving native coronary artery of native heart without angina pectoris    Lung nodule    COPD (chronic obstructive pulmonary disease)    Multiple thyroid nodules    Anxiety    Atherosclerosis of aorta    Hyperlipidemia    Diastolic dysfunction    HTN (hypertension)    Macular degeneration    Vitamin D deficiency    BPPV (benign paroxysmal positional vertigo)    Hearing loss     Review of patient's allergies indicates:  No Known Allergies    The following were reviewed at this visit: active problem list, medication list, allergies, family history, social history, and health maintenance.    Medications:  Current Outpatient Medications on File Prior to Visit   Medication Sig Dispense Refill    alprazolam (XANAX) 0.25 MG tablet Take 0.25 mg by mouth 2 (two) times daily as needed for Anxiety.      escitalopram oxalate (LEXAPRO) 20 MG tablet TAKE 1 TABLET EVERY DAY 90 tablet 0    hydrOXYzine HCl (ATARAX) 25 MG tablet Take 25 mg by mouth 3 (three) times daily as needed for Itching.      MULTIVITAMIN,THER AND MINERALS (VITAMINS AND MINERALS ORAL)  Take by mouth once daily.      niacin 100 MG Tab Take 100 mg by mouth every evening.       No current facility-administered medications on file prior to visit.        Medications have been reviewed and reconciled with patient at this visit.  Barriers to medications present (yes)    Adverse reactions to current medications (no)    Over the counter medications reviewed (YesAnswers for HPI/ROS submitted by the patient on 10/25/2018   activity change: No  unexpected weight change: No  rhinorrhea: No  trouble swallowing: No  visual disturbance: No  chest tightness: No  polyuria: No  difficulty urinating: No  menstrual problem: No  joint swelling: No  arthralgias: No  confusion: Yes  dysphoric mood: Yes   ), and if needed added to active Medication list at this visit.     Exam:  Wt Readings from Last 3 Encounters:   08/20/18 79.3 kg (174 lb 13.2 oz)   08/13/18 80.5 kg (177 lb 7.5 oz)   11/10/17 75.9 kg (167 lb 5.3 oz)     Temp Readings from Last 3 Encounters:   08/20/18 98.7 °F (37.1 °C) (Tympanic)   08/13/18 97.7 °F (36.5 °C) (Tympanic)   11/10/17 98.6 °F (37 °C) (Tympanic)     BP Readings from Last 3 Encounters:   08/20/18 127/60   08/13/18 130/68   11/10/17 124/66     Pulse Readings from Last 3 Encounters:   08/20/18 70   08/13/18 65   11/10/17 77     There is no height or weight on file to calculate BMI.      Review of Systems   Constitutional: Negative.  Negative for chills and fever.   HENT: Positive for hearing loss. Negative for congestion, sinus pain and sore throat.    Eyes: Negative for blurred vision, double vision and discharge.   Respiratory: Negative for cough, sputum production, shortness of breath and wheezing.    Cardiovascular: Negative for chest pain, palpitations and leg swelling.   Gastrointestinal: Negative for abdominal pain, blood in stool, constipation, diarrhea, heartburn, nausea and vomiting.   Genitourinary: Negative.  Negative for dysuria and hematuria.   Musculoskeletal: Negative.  Negative  for neck pain.   Skin: Negative.  Negative for rash.   Neurological: Negative.  Negative for weakness and headaches.   Endo/Heme/Allergies: Negative.  Negative for polydipsia. Does not bruise/bleed easily.   Psychiatric/Behavioral: Negative for depression and substance abuse.     Physical Exam   Constitutional: She is oriented to person, place, and time. She appears well-developed and well-nourished. No distress.   HENT:   Head: Normocephalic and atraumatic.   Right Ear: External ear normal.   Left Ear: External ear normal.   Nose: Nose normal.   Mouth/Throat: Oropharynx is clear and moist. No oropharyngeal exudate.   Eyes: Conjunctivae and EOM are normal. Pupils are equal, round, and reactive to light. Right eye exhibits no discharge. Left eye exhibits no discharge.   Neck: Normal range of motion. Neck supple. No thyromegaly present.   Cardiovascular: Normal rate, regular rhythm, normal heart sounds and intact distal pulses.   No murmur heard.  Pulmonary/Chest: Effort normal and breath sounds normal. No respiratory distress. She has no wheezes.   Abdominal: Soft. Bowel sounds are normal. She exhibits no distension and no mass. There is no tenderness.   Musculoskeletal: Normal range of motion. She exhibits no edema.   Lymphadenopathy:     She has no cervical adenopathy.   Neurological: She is alert and oriented to person, place, and time. No cranial nerve deficit.   Skin: Capillary refill takes less than 2 seconds. She is not diaphoretic.   Psychiatric: She has a normal mood and affect. Her behavior is normal. Judgment and thought content normal.   Nursing note and vitals reviewed.      Laboratory Reviewed ({Yes)  Lab Results   Component Value Date    WBC 11.79 08/13/2018    HGB 14.5 08/13/2018    HCT 47.1 08/13/2018     (H) 08/13/2018    ALT 19 08/13/2018    AST 21 08/13/2018     08/13/2018    K 4.7 08/13/2018     08/13/2018    CREATININE 0.8 08/13/2018    BUN 27 08/13/2018    CO2 33 (H)  08/13/2018    TSH 2.077 08/13/2018    INR 2.3 (H) 09/12/2017       Diagnoses and all orders for this visit:    Anxiety    Hearing loss, unspecified hearing loss type, unspecified laterality    Need for influenza vaccination      Stable exam  Mild wax in ear  Use drops    Advance directive given to son to discuss for mom  Recheck in Feb            Care Plan/Goals: Reviewed  (N/A)  Goals     None          Follow up: No Follow-up on file.    After visit summary was printed and given to patient upon discharge today.  Patient goals and care plan are included in After Visit Summary.    Answers for HPI/ROS submitted by the patient on 10/25/2018   activity change: No  unexpected weight change: No  rhinorrhea: No  trouble swallowing: No  visual disturbance: No  chest tightness: No  polyuria: No  difficulty urinating: No  menstrual problem: No  joint swelling: No  arthralgias: No  confusion: Yes  dysphoric mood: Yes

## 2018-11-02 DIAGNOSIS — F41.1 GENERALIZED ANXIETY DISORDER: ICD-10-CM

## 2018-11-02 RX ORDER — ESCITALOPRAM OXALATE 20 MG/1
TABLET ORAL
Qty: 90 TABLET | Refills: 0 | Status: SHIPPED | OUTPATIENT
Start: 2018-11-02 | End: 2019-01-16 | Stop reason: SDUPTHER

## 2018-12-17 ENCOUNTER — HOSPITAL ENCOUNTER (OUTPATIENT)
Dept: RADIOLOGY | Facility: HOSPITAL | Age: 83
Discharge: HOME | End: 2018-12-17
Attending: FAMILY MEDICINE
Payer: MEDICARE

## 2018-12-17 ENCOUNTER — OFFICE VISIT (OUTPATIENT)
Dept: INTERNAL MEDICINE | Facility: CLINIC | Age: 83
End: 2018-12-17
Payer: MEDICARE

## 2018-12-17 VITALS
OXYGEN SATURATION: 96 % | DIASTOLIC BLOOD PRESSURE: 64 MMHG | HEART RATE: 87 BPM | WEIGHT: 171.94 LBS | BODY MASS INDEX: 26.99 KG/M2 | SYSTOLIC BLOOD PRESSURE: 116 MMHG | TEMPERATURE: 98 F | HEIGHT: 67 IN

## 2018-12-17 DIAGNOSIS — D32.9 MENINGIOMA: ICD-10-CM

## 2018-12-17 DIAGNOSIS — I70.0 ATHEROSCLEROSIS OF AORTA: Chronic | ICD-10-CM

## 2018-12-17 DIAGNOSIS — R05.9 COUGH: Primary | ICD-10-CM

## 2018-12-17 DIAGNOSIS — R05.9 COUGH: ICD-10-CM

## 2018-12-17 PROBLEM — J44.9 COPD (CHRONIC OBSTRUCTIVE PULMONARY DISEASE): Status: RESOLVED | Noted: 2017-09-12 | Resolved: 2018-12-17

## 2018-12-17 PROCEDURE — 71046 X-RAY EXAM CHEST 2 VIEWS: CPT | Mod: TC,HCNC

## 2018-12-17 PROCEDURE — 99213 OFFICE O/P EST LOW 20 MIN: CPT | Mod: HCNC,S$GLB,, | Performed by: FAMILY MEDICINE

## 2018-12-17 PROCEDURE — 71046 X-RAY EXAM CHEST 2 VIEWS: CPT | Mod: 26,HCNC,, | Performed by: RADIOLOGY

## 2018-12-17 PROCEDURE — 99999 PR PBB SHADOW E&M-EST. PATIENT-LVL III: CPT | Mod: PBBFAC,HCNC,, | Performed by: FAMILY MEDICINE

## 2018-12-17 RX ORDER — ALBUTEROL SULFATE 90 UG/1
2 AEROSOL, METERED RESPIRATORY (INHALATION) EVERY 6 HOURS PRN
Qty: 18 G | Refills: 1 | Status: SHIPPED | OUTPATIENT
Start: 2018-12-17 | End: 2019-02-27

## 2018-12-17 RX ORDER — ASPIRIN 81 MG/1
81 TABLET ORAL DAILY
Refills: 0
Start: 2018-12-17 | End: 2019-02-27

## 2018-12-17 NOTE — PROGRESS NOTES
Ashley Koenig  12/17/2018  [id number]    Maggy Ngo MD  Patient Care Team:  Maggy Ngo MD as PCP - General (Family Medicine)  Anoop Snyder MD as Consulting Physician (Hematology and Oncology)  Luz Marina Mccoy LPN as Care Coordinator (Internal Medicine)  Has the patient seen any provider outside of the [facility] network since the last visit? (no). If yes, HIPPA forms completed and records requested.        Visit Type:an urgent visit for a new problem    Chief Complaint:  Chief Complaint   Patient presents with    Cough     has been having a bad cough for the past three days. he said he has been giving her cough dm meds for it. sn said its a wet cough    Wheezing     especially when she sleeps       History of Present Illness:  89 year old here for cough. She is brought in by her son.  He reports cough for 3 days.  She has taken OTC Robitussin dm and son reports not much resolution.  Denies fever.  Son reports he has heard Wheezing.   No fever.    She has history of being on Oxygen for Hypoxia years ago after a PE. However she was able to wean off and has had her oxygen stay around 94-96% RA.    She has 3 family members with cough at home for sick contacts.          History:  Past Medical History:   Diagnosis Date    Age-related macular degeneration, wet, both eyes     Anginal equivalent     Anxiety     Arthritis     BPPV (benign paroxysmal positional vertigo)     Brain tumor     Clotting disorder     lung    COPD (chronic obstructive pulmonary disease)     Depression     Diastolic dysfunction     Hearing loss     Heart disease     Hyperlipidemia     Hypertension     Hypoxemia     Lung nodule     Meningioma     Multiple thyroid nodules     Pulmonary embolism     Pulmonary hypertension     Sleep apnea in adult     Stroke     per eye exam, never been treated for    Urinary incontinence     Vitamin D deficiency      Past Surgical History:   Procedure Laterality Date     CATARACT EXTRACTION Bilateral     TOTAL HIP ARTHROPLASTY Right 08/20/2016    aafter fx     Family History   Problem Relation Age of Onset    Alzheimer's disease Father     Alzheimer's disease Daughter     No Known Problems Son      Social History     Socioeconomic History    Marital status:      Spouse name: Not on file    Number of children: Not on file    Years of education: Not on file    Highest education level: Not on file   Social Needs    Financial resource strain: Not on file    Food insecurity - worry: Not on file    Food insecurity - inability: Not on file    Transportation needs - medical: Not on file    Transportation needs - non-medical: Not on file   Occupational History    Not on file   Tobacco Use    Smoking status: Never Smoker    Smokeless tobacco: Never Used   Substance and Sexual Activity    Alcohol use: No    Drug use: No    Sexual activity: Not Currently   Other Topics Concern    Not on file   Social History Narrative    Patient recently moved from [place] to [place] in August 2017. She had previously lived in [place] her whole life. , 5 children (4 alive). Son Mook has medical POA. Does not drive.      Patient Active Problem List   Diagnosis    Meningioma    History of pulmonary embolism    Coronary artery disease involving native coronary artery of native heart without angina pectoris    Lung nodule    Multiple thyroid nodules    Anxiety    Atherosclerosis of aorta    Hyperlipidemia    Diastolic dysfunction    HTN (hypertension)    Macular degeneration    Vitamin D deficiency    BPPV (benign paroxysmal positional vertigo)    Hearing loss     Review of patient's allergies indicates:  No Known Allergies    The following were reviewed at this visit: active problem list, medication list, allergies, family history, social history, and health maintenance.    Medications:  Current Outpatient Medications on File Prior to Visit   Medication Sig Dispense  Refill    escitalopram oxalate (LEXAPRO) 20 MG tablet TAKE 1 TABLET EVERY DAY 90 tablet 0    MULTIVITAMIN,THER AND MINERALS (VITAMINS AND MINERALS ORAL) Take by mouth once daily.      niacin 100 MG Tab Take 100 mg by mouth every evening.      alprazolam (XANAX) 0.25 MG tablet Take 0.25 mg by mouth 2 (two) times daily as needed for Anxiety.      hydrOXYzine HCl (ATARAX) 25 MG tablet Take 25 mg by mouth 3 (three) times daily as needed for Itching.       No current facility-administered medications on file prior to visit.        Medications have been reviewed and reconciled with patient at this visit.  Barriers to medications present (no)    Adverse reactions to current medications (no)    Over the counter medications reviewed (Yes ), and if needed added to active Medication list at this visit.     Exam:  Wt Readings from Last 3 Encounters:   12/17/18 78 kg (171 lb 15.3 oz)   10/25/18 80.1 kg (176 lb 9.4 oz)   08/20/18 79.3 kg (174 lb 13.2 oz)     Temp Readings from Last 3 Encounters:   12/17/18 98.3 °F (36.8 °C) (Tympanic)   10/25/18 98.2 °F (36.8 °C) (Tympanic)   08/20/18 98.7 °F (37.1 °C) (Tympanic)     BP Readings from Last 3 Encounters:   12/17/18 116/64   10/25/18 102/60   08/20/18 127/60     Pulse Readings from Last 3 Encounters:   12/17/18 87   10/25/18 85   08/20/18 70     Body mass index is 26.93 kg/m².      Review of Systems   Constitutional: Negative for chills, fever and weight loss.   HENT: Negative for ear pain and sore throat.    Respiratory: Positive for cough and wheezing. Negative for hemoptysis and shortness of breath.    Cardiovascular: Negative for chest pain.   Gastrointestinal: Negative for heartburn.   Musculoskeletal: Negative for myalgias.   Skin: Negative for rash.   Neurological: Negative for headaches.   Endo/Heme/Allergies: Negative for environmental allergies.     Physical Exam   Constitutional: She is oriented to person, place, and time. She appears well-developed and  well-nourished. No distress.   HENT:   Head: Normocephalic and atraumatic.   Right Ear: External ear normal.   Left Ear: External ear normal.   Nose: Nose normal.   Mouth/Throat: Oropharynx is clear and moist. No oropharyngeal exudate.   Eyes: Conjunctivae and EOM are normal. Pupils are equal, round, and reactive to light. Right eye exhibits no discharge. Left eye exhibits no discharge.   Neck: Normal range of motion. Neck supple. No thyromegaly present.   Cardiovascular: Normal rate, regular rhythm, normal heart sounds and intact distal pulses.   No murmur heard.  Pulmonary/Chest: Effort normal. No stridor. No respiratory distress. She has no decreased breath sounds. She has wheezes in the right upper field and the left upper field.   Abdominal: Soft. Bowel sounds are normal. She exhibits no distension and no mass. There is no tenderness.   Musculoskeletal: Normal range of motion. She exhibits no edema.   Lymphadenopathy:     She has no cervical adenopathy.   Neurological: She is alert and oriented to person, place, and time. No cranial nerve deficit.   Skin: Capillary refill takes less than 2 seconds. She is not diaphoretic.   Psychiatric: She has a normal mood and affect. She exhibits abnormal recent memory and abnormal remote memory.   Nursing note and vitals reviewed.      Laboratory Reviewed ({Yes)  Lab Results   Component Value Date    WBC 11.79 08/13/2018    HGB 14.5 08/13/2018    HCT 47.1 08/13/2018     (H) 08/13/2018    ALT 19 08/13/2018    AST 21 08/13/2018     08/13/2018    K 4.7 08/13/2018     08/13/2018    CREATININE 0.8 08/13/2018    BUN 27 08/13/2018    CO2 33 (H) 08/13/2018    TSH 2.077 08/13/2018    INR 2.3 (H) 09/12/2017       Ashley was seen today for cough and wheezing.    Diagnoses and all orders for this visit:    Cough  -     X-Ray Chest PA And Lateral; Future  -     albuterol (VENTOLIN HFA) 90 mcg/actuation inhaler; Inhale 2 puffs into the lungs every 6 (six) hours as  "needed for Wheezing. Rescue             History of PE  :aspirin (ECOTRIN) 81 MG EC tablet; Take 1 tablet (81 mg total) by mouth once daily.    Meningioma  She has a history of meningioma and was previously followed by Neurology. At one point she had gamma knife therapy "many years ago" and the decision was made at that time to discontinue monitoring.     Atherosclerosis of aorta  Typical for age  Lipids reviewed  No statin.  ASA daily         Will check Xray  Will start albuterol, may need nebulizer if unable to use.  Will see if she responds. Continue the Mucinex Dm OTC  Advised to call or return if not improving 2 days            Care Plan/Goals: Reviewed  (N/A)  Goals     None          Follow up: No Follow-up on file.    After visit summary was printed and given to patient upon discharge today.  Patient goals and care plan are included in After Visit Summary.    Answers for HPI/ROS submitted by the patient on 12/17/2018   Cough  Chronicity: new  Onset: in the past 7 days  Progression since onset: gradually worsening  Frequency: every few minutes  Cough characteristics: productive of sputum  ear congestion: No  nasal congestion: Yes  postnasal drip: No  rhinorrhea: Yes  sweats: No  Aggravated by: nothing  asthma: No  bronchiectasis: No  bronchitis: No  COPD: No  emphysema: No  pneumonia: No  Treatments tried: OTC cough suppressant  Improvement on treatment: no relief    "

## 2018-12-20 DIAGNOSIS — R93.89 ABNORMAL CHEST X-RAY: Primary | ICD-10-CM

## 2018-12-26 ENCOUNTER — TELEPHONE (OUTPATIENT)
Dept: RADIOLOGY | Facility: HOSPITAL | Age: 83
End: 2018-12-26

## 2019-01-09 ENCOUNTER — OFFICE VISIT (OUTPATIENT)
Dept: INTERNAL MEDICINE | Facility: CLINIC | Age: 84
End: 2019-01-09
Payer: MEDICARE

## 2019-01-09 ENCOUNTER — TELEPHONE (OUTPATIENT)
Dept: RADIOLOGY | Facility: HOSPITAL | Age: 84
End: 2019-01-09

## 2019-01-09 VITALS
HEART RATE: 87 BPM | HEIGHT: 67 IN | WEIGHT: 173.31 LBS | TEMPERATURE: 98 F | SYSTOLIC BLOOD PRESSURE: 118 MMHG | BODY MASS INDEX: 27.2 KG/M2 | DIASTOLIC BLOOD PRESSURE: 60 MMHG | OXYGEN SATURATION: 95 %

## 2019-01-09 DIAGNOSIS — Z86.711 HISTORY OF PULMONARY EMBOLISM: ICD-10-CM

## 2019-01-09 DIAGNOSIS — I70.0 ATHEROSCLEROSIS OF AORTA: Chronic | ICD-10-CM

## 2019-01-09 DIAGNOSIS — D32.9 MENINGIOMA: ICD-10-CM

## 2019-01-09 DIAGNOSIS — F03.90 DEMENTIA WITHOUT BEHAVIORAL DISTURBANCE, UNSPECIFIED DEMENTIA TYPE: ICD-10-CM

## 2019-01-09 DIAGNOSIS — R05.9 COUGH: Primary | ICD-10-CM

## 2019-01-09 PROBLEM — F32.A ANXIETY AND DEPRESSION: Status: ACTIVE | Noted: 2017-09-26

## 2019-01-09 PROCEDURE — 99214 OFFICE O/P EST MOD 30 MIN: CPT | Mod: S$GLB,,, | Performed by: FAMILY MEDICINE

## 2019-01-09 PROCEDURE — 99999 PR PBB SHADOW E&M-EST. PATIENT-LVL IV: CPT | Mod: PBBFAC,,, | Performed by: FAMILY MEDICINE

## 2019-01-09 PROCEDURE — 99999 PR PBB SHADOW E&M-EST. PATIENT-LVL IV: ICD-10-PCS | Mod: PBBFAC,,, | Performed by: FAMILY MEDICINE

## 2019-01-09 PROCEDURE — 99214 PR OFFICE/OUTPT VISIT, EST, LEVL IV, 30-39 MIN: ICD-10-PCS | Mod: S$GLB,,, | Performed by: FAMILY MEDICINE

## 2019-01-09 PROCEDURE — 99499 UNLISTED E&M SERVICE: CPT | Mod: S$PBB,,, | Performed by: FAMILY MEDICINE

## 2019-01-09 PROCEDURE — 99499 RISK ADDL DX/OHS AUDIT: ICD-10-PCS | Mod: S$PBB,,, | Performed by: FAMILY MEDICINE

## 2019-01-09 NOTE — PROGRESS NOTES
Ashley Koenig Answers for HPI/ROS submitted by the patient on 1/9/2019   activity change: Yes  unexpected weight change: No  rhinorrhea: No  trouble swallowing: No  visual disturbance: No  chest tightness: No  polyuria: No  difficulty urinating: No  menstrual problem: No  joint swelling: No  arthralgias: No  confusion: Yes  dysphoric mood: Yes    01/10/2019  [id number]    Maggy Ngo MD  Patient Care Team:  Maggy Ngo MD as PCP - General (Family Medicine)  Anoop Snyder MD as Consulting Physician (Hematology and Oncology)  Luz Marina Mccoy LPN as Care Coordinator (Internal Medicine)  Has the patient seen any provider outside of the [facility] network since the last visit? (no). If yes, HIPPA forms completed and records requested.        Visit Type:an urgent visit for an existing problem     Chief Complaint:  Chief Complaint   Patient presents with    Cough    Medication Refill     sent to the new mail order pharmacy. [facility]. the [facility]       History of Present Illness:  Seen 12/17 for cough.  Chest xray done then.  Chest xray was abnl. Discussed with son.  He did not want to do the CT of chest. I ordered it.  FINDINGS:  Asymmetric prominence right hilar soft tissues.  CT recommended to ensure no underlying mass and/or adenopathy.  Heart size within normal limits.  Aorta is tortuous and trachea midline.  Lungs are otherwise clear without opacification or effusion.     She continues to cough today.    Chart review show she has had pulmonary embolism before. (prior to 2014, with right heart strain, normal EF, with pulm HTN). She was on oxygen temporarily) She has this dx when living in [place]. We have incomplete records and the full history cannot be provided by the patient or son.     She was on Coumadin when moving to [facility] 2 years ago, and due to advanced age and unclear etiology or need to be on Coumadin, hematology recommended stopping and just using ASA.  She had not had chronic cough until  last visit. This is new onset.    She was given Albuterol MDI due to reported wheeze at night. Lungs were clear of infiltrate on the chest xray done on 12/17/2018.    Again discussion with son.    History:  Past Medical History:   Diagnosis Date    Age-related macular degeneration, wet, both eyes     Anginal equivalent     Anxiety     Arthritis     BPPV (benign paroxysmal positional vertigo)     Brain tumor     Clotting disorder     lung    COPD (chronic obstructive pulmonary disease)     Depression     Diastolic dysfunction     Hearing loss     Heart disease     Hyperlipidemia     Hypertension     Hypoxemia     Lung nodule     Meningioma     Multiple thyroid nodules     Pulmonary embolism     Pulmonary hypertension     Sleep apnea in adult     Stroke     per eye exam, never been treated for    Urinary incontinence     Vitamin D deficiency      Past Surgical History:   Procedure Laterality Date    CATARACT EXTRACTION Bilateral     TOTAL HIP ARTHROPLASTY Right 08/20/2016    aafter fx     Family History   Problem Relation Age of Onset    Alzheimer's disease Father     Alzheimer's disease Daughter     No Known Problems Son      Social History     Socioeconomic History    Marital status:      Spouse name: Not on file    Number of children: Not on file    Years of education: Not on file    Highest education level: Not on file   Social Needs    Financial resource strain: Not on file    Food insecurity - worry: Not on file    Food insecurity - inability: Not on file    Transportation needs - medical: Not on file    Transportation needs - non-medical: Not on file   Occupational History    Not on file   Tobacco Use    Smoking status: Never Smoker    Smokeless tobacco: Never Used   Substance and Sexual Activity    Alcohol use: No    Drug use: No    Sexual activity: Not Currently   Other Topics Concern    Not on file   Social History Narrative    Patient recently moved from  [place] to [place] in August 2017. She had previously lived in [place] her whole life. , 5 children (4 alive). Son Mook has medical POA. Does not drive.      Patient Active Problem List   Diagnosis    Meningioma    History of pulmonary embolism    Coronary artery disease involving native coronary artery of native heart without angina pectoris    Lung nodule    Multiple thyroid nodules    Anxiety and depression    Atherosclerosis of aorta    Hyperlipidemia    Diastolic dysfunction    HTN (hypertension)    Macular degeneration    Vitamin D deficiency    BPPV (benign paroxysmal positional vertigo)    Hearing loss    Cough    Dementia     Review of patient's allergies indicates:  No Known Allergies    The following were reviewed at this visit: active problem list, medication list, allergies, family history, social history, and health maintenance.    Medications:  Current Outpatient Medications on File Prior to Visit   Medication Sig Dispense Refill    alprazolam (XANAX) 0.25 MG tablet Take 0.25 mg by mouth 2 (two) times daily as needed for Anxiety.      escitalopram oxalate (LEXAPRO) 20 MG tablet TAKE 1 TABLET EVERY DAY 90 tablet 0    MULTIVITAMIN,THER AND MINERALS (VITAMINS AND MINERALS ORAL) Take by mouth once daily.      niacin 100 MG Tab Take 100 mg by mouth every evening.      albuterol (VENTOLIN HFA) 90 mcg/actuation inhaler Inhale 2 puffs into the lungs every 6 (six) hours as needed for Wheezing. Rescue 18 g 1    aspirin (ECOTRIN) 81 MG EC tablet Take 1 tablet (81 mg total) by mouth once daily.  0    hydrOXYzine HCl (ATARAX) 25 MG tablet Take 25 mg by mouth 3 (three) times daily as needed for Itching.       No current facility-administered medications on file prior to visit.        Medications have been reviewed and reconciled with patient at this visit.  Barriers to medications present (yes)    Adverse reactions to current medications (no)    Over the counter medications reviewed  (Yes ), and if needed added to active Medication list at this visit.     Exam:  Wt Readings from Last 3 Encounters:   01/09/19 78.6 kg (173 lb 4.5 oz)   12/17/18 78 kg (171 lb 15.3 oz)   10/25/18 80.1 kg (176 lb 9.4 oz)     Temp Readings from Last 3 Encounters:   01/09/19 98.4 °F (36.9 °C) (Tympanic)   12/17/18 98.3 °F (36.8 °C) (Tympanic)   10/25/18 98.2 °F (36.8 °C) (Tympanic)     BP Readings from Last 3 Encounters:   01/09/19 118/60   12/17/18 116/64   10/25/18 102/60     Pulse Readings from Last 3 Encounters:   01/09/19 87   12/17/18 87   10/25/18 85     Body mass index is 27.14 kg/m².      Review of Systems   Constitutional: Negative.  Negative for chills and fever.   HENT: Negative.  Negative for congestion, sinus pain and sore throat.    Eyes: Negative for blurred vision, double vision and discharge.   Respiratory: Positive for cough. Negative for sputum production, shortness of breath and wheezing.    Cardiovascular: Negative for chest pain, palpitations and leg swelling.   Gastrointestinal: Negative for abdominal pain, blood in stool, constipation, diarrhea, heartburn, nausea and vomiting.   Genitourinary: Negative.  Negative for dysuria and hematuria.   Musculoskeletal: Negative.  Negative for neck pain.   Skin: Negative.  Negative for rash.   Neurological: Negative.  Negative for weakness and headaches.   Endo/Heme/Allergies: Negative.  Negative for polydipsia. Does not bruise/bleed easily.   Psychiatric/Behavioral: Negative for depression and substance abuse.     Physical Exam   HENT:   Head: Normocephalic.   Eyes: EOM are normal. Pupils are equal, round, and reactive to light.   Neck: Normal range of motion. Neck supple.   Cardiovascular: Normal rate, regular rhythm and normal heart sounds.   Pulmonary/Chest: Effort normal and breath sounds normal. She has no wheezes.   Psychiatric: Cognition and memory are impaired.   Nursing note and vitals reviewed.      Laboratory Reviewed ({Yes)  Lab Results    Component Value Date    WBC 11.79 08/13/2018    HGB 14.5 08/13/2018    HCT 47.1 08/13/2018     (H) 08/13/2018    ALT 19 08/13/2018    AST 21 08/13/2018     08/13/2018    K 4.7 08/13/2018     08/13/2018    CREATININE 0.8 08/13/2018    BUN 27 08/13/2018    CO2 33 (H) 08/13/2018    TSH 2.077 08/13/2018    INR 2.3 (H) 09/12/2017       Ashley was seen today for cough and medication refill.    Diagnoses and all orders for this visit:    Cough   Chest sounds clear, without wheeze.    Pulse ox when I rechecked in office 93% RA.    Patient does not report SOB, just cough and its hard for her to catch breath when she coughs.   I reviewed chest xray again with son, and told him I order CT of chest, but he again declines.        History of pulmonary embolism  -     Ambulatory referral to Home Health   Has seen Hematology in [facility], decision to stop anticoag and take ASA daily around 2 years ago.  Discussed with son, only way to know if cough is due to return of PE, is to CT chest, and he again declines, and reports patient does not want to do, she didn't even want to do the chest xray.     Atherosclerosis of aorta  -     Ambulatory referral to Home Health   Patient does not want statin for this.     Meningioma  -     Ambulatory referral to Home Health   Patient wishes no more scans or review of this.     Son is power of attorneys.   She reports patient does not want CT scan of chest. Does not want to know if mass or cancer in lungs.  Discussed the findings on Xray.  Discussed previous history of PE, and only way to find out if mass or embolism is to check CT of chest.  He declines for me to schedule today.  He will monitor patient cough, which he reports is getting a little better.  I will order home health for vitals, oxygen reporting and PT.  If oxygen remains lower, then we can do CT and refer to Pulm for supplemental oxygen.  He at this point does not want to persue aggressive tx due to dementia/advanced  age/patient wishes.   I offered palliative care, and he reports he will see how next 3-4 weeks go and then contact me if his wishes changes.   I asked him to bring copy of advanced directive, as he reports patient does have. He reports he is power of attny for patient.     Positive PHQ-2.  Patient on high dose lexapro for Age.  No change in status, addressed and will continue medication.  She has prn Xanax for agitiation.   Dementia is progressing.     >30 min spent with patient and family discussing wishes and advance care planning.      Care Plan/Goals: Reviewed  (N/A)  Goals     None          Follow up: Follow-up in about 4 weeks (around 2/6/2019).    After visit summary was printed and given to patient upon discharge today.  Patient goals and care plan are included in After Visit Summary.      Addendum:  [employer] [employer] Reviewing Chart  Information reviewed  No overt weakness.  I can reports that son noted a decline in endurance at the house, with ADLs.  Could be contributed to coughing.   I will order [facility] OT.  Services will be determined by insurance willing to cover.  Order has been placed.

## 2019-01-10 PROBLEM — F03.90 DEMENTIA: Status: ACTIVE | Noted: 2019-01-10

## 2019-01-16 ENCOUNTER — PATIENT MESSAGE (OUTPATIENT)
Dept: INTERNAL MEDICINE | Facility: CLINIC | Age: 84
End: 2019-01-16

## 2019-01-16 DIAGNOSIS — F41.1 GENERALIZED ANXIETY DISORDER: ICD-10-CM

## 2019-01-16 RX ORDER — ESCITALOPRAM OXALATE 20 MG/1
20 TABLET ORAL DAILY
Qty: 90 TABLET | Refills: 0 | Status: SHIPPED | OUTPATIENT
Start: 2019-01-16 | End: 2019-05-03 | Stop reason: SDUPTHER

## 2019-01-18 ENCOUNTER — TELEPHONE (OUTPATIENT)
Dept: INTERNAL MEDICINE | Facility: CLINIC | Age: 84
End: 2019-01-18

## 2019-01-18 NOTE — TELEPHONE ENCOUNTER
----- Message from Ruchi Ford sent at 1/18/2019  9:52 AM CST -----  Contact: [facility]  Stated she's calling about a referral for the pt she can be reached at [phone] Thanks

## 2019-01-18 NOTE — TELEPHONE ENCOUNTER
New Order placed  Fax  Tell Son that [facility] and [facility] did not take [facility]. We had to find HH that did, and we will refer to [facility]

## 2019-01-21 NOTE — TELEPHONE ENCOUNTER
Spoke with son to let him know that orders and notes were sent to [facility].

## 2019-01-22 ENCOUNTER — TELEPHONE (OUTPATIENT)
Dept: INTERNAL MEDICINE | Facility: CLINIC | Age: 84
End: 2019-01-22

## 2019-01-22 NOTE — TELEPHONE ENCOUNTER
----- Message from Kat Velez sent at 1/22/2019  3:31 PM CST -----  Contact: [facility]  Ros called to speak with the nurse; she received an order for home health but she needs some additional information. She can be contacted at [phone] - she leaves at 4 pm.    Thanks,  Kat

## 2019-01-23 ENCOUNTER — TELEPHONE (OUTPATIENT)
Dept: INTERNAL MEDICINE | Facility: CLINIC | Age: 84
End: 2019-01-23

## 2019-01-23 NOTE — TELEPHONE ENCOUNTER
----- Message from Rosy Castro sent at 1/23/2019  7:19 AM CST -----  Contact: Ros, [facility]  Ms Ros is returning a call from Marek regarding patient, please call her back at [phone]. Thank you

## 2019-01-25 ENCOUNTER — TELEPHONE (OUTPATIENT)
Dept: INTERNAL MEDICINE | Facility: CLINIC | Age: 84
End: 2019-01-25

## 2019-01-25 NOTE — TELEPHONE ENCOUNTER
----- Message from Sarah Ngo sent at 1/25/2019  7:47 AM CST -----  Ros w/[facility] needs a Home Health form and is trying to establish what the pt need and if the pt is homebound.   [phone]

## 2019-01-25 NOTE — TELEPHONE ENCOUNTER
"I made addendum to the notes.  Son will not report over "weakness".   She has had some slowing in day to day activity, decline in endurance.    Hoping home PT can get out there to assist as he did report decline when speaking at office visit.  I made that addendum."

## 2019-02-27 ENCOUNTER — OFFICE VISIT (OUTPATIENT)
Dept: OTOLARYNGOLOGY | Facility: CLINIC | Age: 84
End: 2019-02-27
Payer: MEDICARE

## 2019-02-27 VITALS
HEART RATE: 91 BPM | BODY MASS INDEX: 26.83 KG/M2 | WEIGHT: 171.31 LBS | TEMPERATURE: 99 F | DIASTOLIC BLOOD PRESSURE: 60 MMHG | SYSTOLIC BLOOD PRESSURE: 132 MMHG

## 2019-02-27 DIAGNOSIS — H91.93 BILATERAL HEARING LOSS, UNSPECIFIED HEARING LOSS TYPE: ICD-10-CM

## 2019-02-27 DIAGNOSIS — H61.23 BILATERAL IMPACTED CERUMEN: Primary | ICD-10-CM

## 2019-02-27 PROCEDURE — 69210 REMOVE IMPACTED EAR WAX UNI: CPT | Mod: S$GLB,,, | Performed by: PHYSICIAN ASSISTANT

## 2019-02-27 PROCEDURE — 99999 PR PBB SHADOW E&M-EST. PATIENT-LVL III: CPT | Mod: PBBFAC,,, | Performed by: PHYSICIAN ASSISTANT

## 2019-02-27 PROCEDURE — 99499 UNLISTED E&M SERVICE: CPT | Mod: S$GLB,,, | Performed by: PHYSICIAN ASSISTANT

## 2019-02-27 PROCEDURE — 99999 PR PBB SHADOW E&M-EST. PATIENT-LVL III: ICD-10-PCS | Mod: PBBFAC,,, | Performed by: PHYSICIAN ASSISTANT

## 2019-02-27 PROCEDURE — 69210 PR REMOVAL IMPACTED CERUMEN REQUIRING INSTRUMENTATION, UNILATERAL: ICD-10-PCS | Mod: S$GLB,,, | Performed by: PHYSICIAN ASSISTANT

## 2019-02-27 PROCEDURE — 99499 NO LOS: ICD-10-PCS | Mod: S$GLB,,, | Performed by: PHYSICIAN ASSISTANT

## 2019-02-27 NOTE — PROGRESS NOTES
Subjective:       Patient ID: Ashley Koenig is a 89 y.o. female.    Chief Complaint: Cerumen Impaction (c/o cant hear with hearing aids )    Patient is here to see me today for evaluation of a possible wax impaction in bilateral ears.  She has complaints of hearing loss in the affected ears, but denies pain or drainage.  This has been an issue in the past.  The patient has been using any sort of ear drop to soften the wax.      Review of Systems   HENT: Positive for hearing loss.    Musculoskeletal: Positive for gait problem.       Objective:      Physical Exam   HENT:   Bilateral cerumen impaction         Procedure Note    CHIEF COMPLAINT:  Cerumen Impaction    Description:  The patient was seated in an exam chair.  An ear speculum was placed in the right EAC and was examined under the microscope.  Suction and/or loop curettes were used to remove a large cerumen impaction.  The tympanic membrane was visualized and was normal in appearance.  The procedure was repeated on the left side in a similar fashion.  The TM was intact and normal on this side as well.  The patient tolerated the procedure well.  Assessment:       1. Bilateral impacted cerumen    2. Bilateral hearing loss, unspecified hearing loss type        Plan:        Cerumen impaction:  Removed today without difficulty.  I would recommend the use of a wax softening drop, either over the counter Debrox or mineral oil, on a weekly basis.  I also instructed the patient to avoid Qtips.

## 2019-03-06 ENCOUNTER — HOSPITAL ENCOUNTER (INPATIENT)
Facility: HOSPITAL | Age: 84
LOS: 6 days | Discharge: HOME HEALTH | DRG: 871 | End: 2019-03-12
Attending: EMERGENCY MEDICINE | Admitting: FAMILY MEDICINE
Payer: MEDICARE

## 2019-03-06 DIAGNOSIS — J69.0 ASPIRATION PNEUMONIA OF BOTH LOWER LOBES, UNSPECIFIED ASPIRATION PNEUMONIA TYPE: ICD-10-CM

## 2019-03-06 DIAGNOSIS — I50.33 ACUTE ON CHRONIC DIASTOLIC CONGESTIVE HEART FAILURE: ICD-10-CM

## 2019-03-06 DIAGNOSIS — R79.89 ELEVATED TROPONIN: ICD-10-CM

## 2019-03-06 DIAGNOSIS — J81.0 ACUTE PULMONARY EDEMA: ICD-10-CM

## 2019-03-06 DIAGNOSIS — I25.10 CORONARY ARTERY DISEASE INVOLVING NATIVE CORONARY ARTERY OF NATIVE HEART WITHOUT ANGINA PECTORIS: Chronic | ICD-10-CM

## 2019-03-06 DIAGNOSIS — J69.0 ASPIRATION PNEUMONIA: ICD-10-CM

## 2019-03-06 DIAGNOSIS — F03.90 DEMENTIA WITHOUT BEHAVIORAL DISTURBANCE, UNSPECIFIED DEMENTIA TYPE: Chronic | ICD-10-CM

## 2019-03-06 DIAGNOSIS — I50.9 CHF (CONGESTIVE HEART FAILURE): ICD-10-CM

## 2019-03-06 DIAGNOSIS — R06.02 SOB (SHORTNESS OF BREATH): ICD-10-CM

## 2019-03-06 DIAGNOSIS — R06.02 SHORTNESS OF BREATH: ICD-10-CM

## 2019-03-06 DIAGNOSIS — J96.01 ACUTE HYPOXEMIC RESPIRATORY FAILURE: Primary | ICD-10-CM

## 2019-03-06 PROBLEM — R82.71 BACTERIURIA: Status: ACTIVE | Noted: 2019-03-06

## 2019-03-06 PROBLEM — R65.10 SIRS (SYSTEMIC INFLAMMATORY RESPONSE SYNDROME): Status: ACTIVE | Noted: 2019-03-06

## 2019-03-06 PROBLEM — F41.9 ANXIETY: Status: ACTIVE | Noted: 2019-03-06

## 2019-03-06 LAB
ALBUMIN SERPL BCP-MCNC: 2.8 G/DL
ALLENS TEST: ABNORMAL
ALP SERPL-CCNC: 159 U/L
ALT SERPL W/O P-5'-P-CCNC: 20 U/L
ANION GAP SERPL CALC-SCNC: 8 MMOL/L
AORTIC VALVE REGURGITATION: ABNORMAL
APTT BLDCRRT: 32.8 SEC
AST SERPL-CCNC: 22 U/L
BACTERIA #/AREA URNS HPF: ABNORMAL /HPF
BASOPHILS # BLD AUTO: ABNORMAL K/UL
BASOPHILS NFR BLD: 0 %
BILIRUB SERPL-MCNC: 0.4 MG/DL
BILIRUB UR QL STRIP: NEGATIVE
BNP SERPL-MCNC: 326 PG/ML
BUN SERPL-MCNC: 17 MG/DL
CALCIUM SERPL-MCNC: 9.8 MG/DL
CHLORIDE SERPL-SCNC: 98 MMOL/L
CLARITY UR: CLEAR
CO2 SERPL-SCNC: 33 MMOL/L
COLOR UR: YELLOW
CREAT SERPL-MCNC: 0.8 MG/DL
DACRYOCYTES BLD QL SMEAR: ABNORMAL
DIASTOLIC DYSFUNCTION: YES
DIFFERENTIAL METHOD: ABNORMAL
EOSINOPHIL # BLD AUTO: ABNORMAL K/UL
EOSINOPHIL NFR BLD: 1 %
ERYTHROCYTE [DISTWIDTH] IN BLOOD BY AUTOMATED COUNT: 14.3 %
EST. GFR  (AFRICAN AMERICAN): >60 ML/MIN/1.73 M^2
EST. GFR  (NON AFRICAN AMERICAN): >60 ML/MIN/1.73 M^2
ESTIMATED PA SYSTOLIC PRESSURE: 40.7
GLUCOSE SERPL-MCNC: 110 MG/DL
GLUCOSE UR QL STRIP: NEGATIVE
HCO3 UR-SCNC: 33.9 MMOL/L (ref 24–28)
HCT VFR BLD AUTO: 42.9 %
HGB BLD-MCNC: 13.9 G/DL
HGB UR QL STRIP: NEGATIVE
HYALINE CASTS #/AREA URNS LPF: 0 /LPF
INFLUENZA A, MOLECULAR: NEGATIVE
INFLUENZA B, MOLECULAR: NEGATIVE
INR PPP: 1
KETONES UR QL STRIP: NEGATIVE
LACTATE SERPL-SCNC: 0.8 MMOL/L
LACTATE SERPL-SCNC: 1 MMOL/L
LEUKOCYTE ESTERASE UR QL STRIP: NEGATIVE
LYMPHOCYTES # BLD AUTO: ABNORMAL K/UL
LYMPHOCYTES NFR BLD: 10 %
MAGNESIUM SERPL-MCNC: 1.9 MG/DL
MCH RBC QN AUTO: 33.7 PG
MCHC RBC AUTO-ENTMCNC: 32.4 G/DL
MCV RBC AUTO: 104 FL
MICROSCOPIC COMMENT: ABNORMAL
MITRAL VALVE REGURGITATION: ABNORMAL
MONOCYTES # BLD AUTO: ABNORMAL K/UL
MONOCYTES NFR BLD: 3 %
MYELOCYTES NFR BLD MANUAL: 4 %
NEUTROPHILS NFR BLD: 82 %
NITRITE UR QL STRIP: POSITIVE
OVALOCYTES BLD QL SMEAR: ABNORMAL
PCO2 BLDA: 58.5 MMHG (ref 35–45)
PH SMN: 7.37 [PH] (ref 7.35–7.45)
PH UR STRIP: 6 [PH] (ref 5–8)
PHOSPHATE SERPL-MCNC: 2.9 MG/DL
PLATELET # BLD AUTO: 400 K/UL
PMV BLD AUTO: 9.2 FL
PO2 BLDA: 52 MMHG (ref 80–100)
POC BE: 9 MMOL/L
POC SATURATED O2: 85 % (ref 95–100)
POIKILOCYTOSIS BLD QL SMEAR: SLIGHT
POTASSIUM SERPL-SCNC: 3.6 MMOL/L
PROCALCITONIN SERPL IA-MCNC: 0.13 NG/ML
PROT SERPL-MCNC: 7.1 G/DL
PROT UR QL STRIP: ABNORMAL
PROTHROMBIN TIME: 10.9 SEC
RBC # BLD AUTO: 4.13 M/UL
RBC #/AREA URNS HPF: 2 /HPF (ref 0–4)
RETIRED EF AND QEF - SEE NOTES: 60 (ref 55–65)
SAMPLE: ABNORMAL
SITE: ABNORMAL
SODIUM SERPL-SCNC: 139 MMOL/L
SP GR UR STRIP: >=1.03 (ref 1–1.03)
SPECIMEN SOURCE: NORMAL
STOMATOCYTES BLD QL SMEAR: PRESENT
TRICUSPID VALVE REGURGITATION: ABNORMAL
TROPONIN I SERPL DL<=0.01 NG/ML-MCNC: 0.04 NG/ML
TROPONIN I SERPL DL<=0.01 NG/ML-MCNC: 0.05 NG/ML
TROPONIN I SERPL DL<=0.01 NG/ML-MCNC: 0.05 NG/ML
URN SPEC COLLECT METH UR: ABNORMAL
UROBILINOGEN UR STRIP-ACNC: NEGATIVE EU/DL
WBC # BLD AUTO: 22.73 K/UL
WBC #/AREA URNS HPF: 1 /HPF (ref 0–5)

## 2019-03-06 PROCEDURE — 87040 BLOOD CULTURE FOR BACTERIA: CPT | Mod: 59

## 2019-03-06 PROCEDURE — 80053 COMPREHEN METABOLIC PANEL: CPT

## 2019-03-06 PROCEDURE — 25000242 PHARM REV CODE 250 ALT 637 W/ HCPCS: Performed by: FAMILY MEDICINE

## 2019-03-06 PROCEDURE — A4216 STERILE WATER/SALINE, 10 ML: HCPCS | Performed by: FAMILY MEDICINE

## 2019-03-06 PROCEDURE — 99291 CRITICAL CARE FIRST HOUR: CPT | Mod: 25

## 2019-03-06 PROCEDURE — 84484 ASSAY OF TROPONIN QUANT: CPT | Mod: 91

## 2019-03-06 PROCEDURE — 25000003 PHARM REV CODE 250: Performed by: FAMILY MEDICINE

## 2019-03-06 PROCEDURE — 85027 COMPLETE CBC AUTOMATED: CPT

## 2019-03-06 PROCEDURE — 93010 EKG 12-LEAD: ICD-10-PCS | Mod: ,,, | Performed by: INTERNAL MEDICINE

## 2019-03-06 PROCEDURE — 36415 COLL VENOUS BLD VENIPUNCTURE: CPT

## 2019-03-06 PROCEDURE — 93306 2D ECHO WITH COLOR FLOW DOPPLER: ICD-10-PCS | Mod: 26,,, | Performed by: INTERNAL MEDICINE

## 2019-03-06 PROCEDURE — 93005 ELECTROCARDIOGRAM TRACING: CPT

## 2019-03-06 PROCEDURE — 27000221 HC OXYGEN, UP TO 24 HOURS

## 2019-03-06 PROCEDURE — 85007 BL SMEAR W/DIFF WBC COUNT: CPT

## 2019-03-06 PROCEDURE — 83880 ASSAY OF NATRIURETIC PEPTIDE: CPT

## 2019-03-06 PROCEDURE — 96375 TX/PRO/DX INJ NEW DRUG ADDON: CPT

## 2019-03-06 PROCEDURE — 83735 ASSAY OF MAGNESIUM: CPT

## 2019-03-06 PROCEDURE — 84100 ASSAY OF PHOSPHORUS: CPT

## 2019-03-06 PROCEDURE — 85730 THROMBOPLASTIN TIME PARTIAL: CPT

## 2019-03-06 PROCEDURE — 63600175 PHARM REV CODE 636 W HCPCS: Performed by: FAMILY MEDICINE

## 2019-03-06 PROCEDURE — 21400001 HC TELEMETRY ROOM

## 2019-03-06 PROCEDURE — 99900035 HC TECH TIME PER 15 MIN (STAT)

## 2019-03-06 PROCEDURE — 83605 ASSAY OF LACTIC ACID: CPT

## 2019-03-06 PROCEDURE — 25000003 PHARM REV CODE 250: Performed by: EMERGENCY MEDICINE

## 2019-03-06 PROCEDURE — 94761 N-INVAS EAR/PLS OXIMETRY MLT: CPT

## 2019-03-06 PROCEDURE — 87502 INFLUENZA DNA AMP PROBE: CPT

## 2019-03-06 PROCEDURE — 93306 TTE W/DOPPLER COMPLETE: CPT

## 2019-03-06 PROCEDURE — 84145 PROCALCITONIN (PCT): CPT

## 2019-03-06 PROCEDURE — 81000 URINALYSIS NONAUTO W/SCOPE: CPT

## 2019-03-06 PROCEDURE — 84484 ASSAY OF TROPONIN QUANT: CPT

## 2019-03-06 PROCEDURE — 96365 THER/PROPH/DIAG IV INF INIT: CPT

## 2019-03-06 PROCEDURE — 94640 AIRWAY INHALATION TREATMENT: CPT

## 2019-03-06 PROCEDURE — 36600 WITHDRAWAL OF ARTERIAL BLOOD: CPT

## 2019-03-06 PROCEDURE — 25500020 PHARM REV CODE 255: Performed by: FAMILY MEDICINE

## 2019-03-06 PROCEDURE — 85610 PROTHROMBIN TIME: CPT

## 2019-03-06 PROCEDURE — 94760 N-INVAS EAR/PLS OXIMETRY 1: CPT

## 2019-03-06 PROCEDURE — 93010 ELECTROCARDIOGRAM REPORT: CPT | Mod: ,,, | Performed by: INTERNAL MEDICINE

## 2019-03-06 PROCEDURE — 93306 TTE W/DOPPLER COMPLETE: CPT | Mod: 26,,, | Performed by: INTERNAL MEDICINE

## 2019-03-06 PROCEDURE — 82803 BLOOD GASES ANY COMBINATION: CPT

## 2019-03-06 PROCEDURE — 63600175 PHARM REV CODE 636 W HCPCS: Performed by: EMERGENCY MEDICINE

## 2019-03-06 PROCEDURE — 25000242 PHARM REV CODE 250 ALT 637 W/ HCPCS: Performed by: EMERGENCY MEDICINE

## 2019-03-06 RX ORDER — ENOXAPARIN SODIUM 100 MG/ML
40 INJECTION SUBCUTANEOUS EVERY 24 HOURS
Status: DISCONTINUED | OUTPATIENT
Start: 2019-03-06 | End: 2019-03-12 | Stop reason: HOSPADM

## 2019-03-06 RX ORDER — SODIUM CHLORIDE 0.9 % (FLUSH) 0.9 %
5 SYRINGE (ML) INJECTION
Status: DISCONTINUED | OUTPATIENT
Start: 2019-03-06 | End: 2019-03-12 | Stop reason: HOSPADM

## 2019-03-06 RX ORDER — HYDROCODONE BITARTRATE AND ACETAMINOPHEN 5; 325 MG/1; MG/1
1 TABLET ORAL EVERY 4 HOURS PRN
Status: DISCONTINUED | OUTPATIENT
Start: 2019-03-06 | End: 2019-03-07

## 2019-03-06 RX ORDER — IPRATROPIUM BROMIDE AND ALBUTEROL SULFATE 2.5; .5 MG/3ML; MG/3ML
3 SOLUTION RESPIRATORY (INHALATION)
Status: COMPLETED | OUTPATIENT
Start: 2019-03-06 | End: 2019-03-06

## 2019-03-06 RX ORDER — ONDANSETRON 2 MG/ML
4 INJECTION INTRAMUSCULAR; INTRAVENOUS EVERY 8 HOURS PRN
Status: DISCONTINUED | OUTPATIENT
Start: 2019-03-06 | End: 2019-03-12 | Stop reason: HOSPADM

## 2019-03-06 RX ORDER — POLYETHYLENE GLYCOL 3350 17 G/17G
17 POWDER, FOR SOLUTION ORAL DAILY
Status: DISCONTINUED | OUTPATIENT
Start: 2019-03-06 | End: 2019-03-12 | Stop reason: HOSPADM

## 2019-03-06 RX ORDER — IPRATROPIUM BROMIDE AND ALBUTEROL SULFATE 2.5; .5 MG/3ML; MG/3ML
3 SOLUTION RESPIRATORY (INHALATION)
Status: DISCONTINUED | OUTPATIENT
Start: 2019-03-06 | End: 2019-03-12 | Stop reason: HOSPADM

## 2019-03-06 RX ORDER — ACETAMINOPHEN 325 MG/1
650 TABLET ORAL EVERY 4 HOURS PRN
Status: DISCONTINUED | OUTPATIENT
Start: 2019-03-06 | End: 2019-03-12 | Stop reason: HOSPADM

## 2019-03-06 RX ORDER — ALPRAZOLAM 0.25 MG/1
0.25 TABLET ORAL 2 TIMES DAILY PRN
Status: DISCONTINUED | OUTPATIENT
Start: 2019-03-06 | End: 2019-03-07

## 2019-03-06 RX ORDER — FUROSEMIDE 10 MG/ML
20 INJECTION INTRAMUSCULAR; INTRAVENOUS
Status: COMPLETED | OUTPATIENT
Start: 2019-03-06 | End: 2019-03-06

## 2019-03-06 RX ORDER — SODIUM CHLORIDE 0.9 % (FLUSH) 0.9 %
3 SYRINGE (ML) INJECTION EVERY 8 HOURS
Status: DISCONTINUED | OUTPATIENT
Start: 2019-03-06 | End: 2019-03-12 | Stop reason: HOSPADM

## 2019-03-06 RX ORDER — MULTIVIT WITH IRON,MINERALS
100 TABLET ORAL NIGHTLY
Status: DISCONTINUED | OUTPATIENT
Start: 2019-03-06 | End: 2019-03-12 | Stop reason: HOSPADM

## 2019-03-06 RX ORDER — ASPIRIN 325 MG
325 TABLET ORAL
Status: COMPLETED | OUTPATIENT
Start: 2019-03-06 | End: 2019-03-06

## 2019-03-06 RX ORDER — LOPERAMIDE HYDROCHLORIDE 2 MG/1
2 CAPSULE ORAL
Status: DISCONTINUED | OUTPATIENT
Start: 2019-03-06 | End: 2019-03-12 | Stop reason: HOSPADM

## 2019-03-06 RX ORDER — ESCITALOPRAM OXALATE 10 MG/1
20 TABLET ORAL DAILY
Status: DISCONTINUED | OUTPATIENT
Start: 2019-03-07 | End: 2019-03-12 | Stop reason: HOSPADM

## 2019-03-06 RX ADMIN — ASPIRIN 325 MG ORAL TABLET 325 MG: 325 PILL ORAL at 01:03

## 2019-03-06 RX ADMIN — Medication 3 ML: at 02:03

## 2019-03-06 RX ADMIN — ENOXAPARIN SODIUM 40 MG: 100 INJECTION SUBCUTANEOUS at 04:03

## 2019-03-06 RX ADMIN — IPRATROPIUM BROMIDE AND ALBUTEROL SULFATE 3 ML: .5; 3 SOLUTION RESPIRATORY (INHALATION) at 12:03

## 2019-03-06 RX ADMIN — FUROSEMIDE 20 MG: 10 INJECTION, SOLUTION INTRAMUSCULAR; INTRAVENOUS at 01:03

## 2019-03-06 RX ADMIN — Medication 3 ML: at 09:03

## 2019-03-06 RX ADMIN — CEFTRIAXONE 1 G: 1 INJECTION, SOLUTION INTRAVENOUS at 02:03

## 2019-03-06 RX ADMIN — IPRATROPIUM BROMIDE AND ALBUTEROL SULFATE 3 ML: .5; 3 SOLUTION RESPIRATORY (INHALATION) at 08:03

## 2019-03-06 RX ADMIN — ALPRAZOLAM 0.25 MG: 0.25 TABLET ORAL at 05:03

## 2019-03-06 RX ADMIN — IOHEXOL 100 ML: 350 INJECTION, SOLUTION INTRAVENOUS at 07:03

## 2019-03-06 RX ADMIN — DOXYCYCLINE 100 MG: 100 INJECTION, POWDER, LYOPHILIZED, FOR SOLUTION INTRAVENOUS at 05:03

## 2019-03-06 RX ADMIN — Medication 100 MG: at 09:03

## 2019-03-06 RX ADMIN — POLYETHYLENE GLYCOL 3350 17 G: 17 POWDER, FOR SOLUTION ORAL at 02:03

## 2019-03-06 NOTE — ASSESSMENT & PLAN NOTE
She presented with elevated WBC, HR, and hypoxia with no obvious source of infection only asymptomatic bacteruria which can be cause of her Leukocytosis but underlying PNA not completely excluded so will cover with Rocephin

## 2019-03-06 NOTE — HPI
Ms. Koenig is a 89 yo fairly healthy white female who presented to the ED with her son complaining of SOB associated with a cough. Per the son this has been ongoing for about 1 week. Upon presentation she was found to be in some respiratory distress and hypoxia with oxygen saturation at 86%. She also was found down by family members but denies any LOC.     Chest Xray shows prominent size heart with interstitial opacities of both lungs with Carlee B-lines. Per the patient and her son there is no known history of CHF but he does give a history of PE which from history doesn't sound like it was provoked.

## 2019-03-06 NOTE — ASSESSMENT & PLAN NOTE
She denies any symptoms but given her elevated WBC and UA cannot exclude as I feel her vitals and presentation related more to Respiratory.

## 2019-03-06 NOTE — ED PROVIDER NOTES
SCRIBE #1 NOTE: I, Corinne Mack, am scribing for, and in the presence of, Linda Wilson MD. I have scribed the entire note.      History      Chief Complaint   Patient presents with    Pneumonia     pt has had cough x 1 week; diagnosed with pneumonia at urgent care. Pt also has increased weakness and had fall last night       Review of patient's allergies indicates:  No Known Allergies     HPI   HPI    3/6/2019, 11:45 AM   History obtained from the patient and family      History of Present Illness: Ashley Koenig is a 89 y.o. female patient with PMHx of anxiety, COPD, HTN, PE, and hearing loss who presents to the Emergency Department for cough which onset gradually a week ago. Pt presented to [facility] for evaluation after an unwitnessed fall. Pt's family states they found her on the ground in another room, but the pt did not have any syncope, N/V, or head injury that they could appreciate. Pt was referred to the ED for further evaluation after the pt's CXR at [facility] showed possible PNA. Symptoms are episodic and moderate in severity. No mitigating or exacerbating factors reported. Associated sxs include SOB. Patient denies any fever, chills, congestion, rhinorrhea, sore throat, CP, N/V/D, abd pain, back pain, neck pain, HA, dizziness, and all other sxs at this time. No prior Tx reported. No further complaints or concerns at this time.         Arrival mode: Personal vehicle    PCP: Maggy Ngo MD       Past Medical History:  Past Medical History:   Diagnosis Date    Age-related macular degeneration, wet, both eyes     Anginal equivalent     Anxiety     Arthritis     BPPV (benign paroxysmal positional vertigo)     Brain tumor     Clotting disorder     lung    COPD (chronic obstructive pulmonary disease)     Depression     Diastolic dysfunction     Hearing loss     Heart disease     Hyperlipidemia     Hypertension     Hypoxemia     Lung nodule     Meningioma     Multiple thyroid nodules      Pulmonary embolism     Pulmonary hypertension     Sleep apnea in adult     Stroke     per eye exam, never been treated for    Urinary incontinence     Vitamin D deficiency        Past Surgical History:  Past Surgical History:   Procedure Laterality Date    CATARACT EXTRACTION Bilateral     TOTAL HIP ARTHROPLASTY Right 08/20/2016    aafter fx         Family History:  Family History   Problem Relation Age of Onset    Alzheimer's disease Father     Alzheimer's disease Daughter     No Known Problems Son        Social History:  Social History     Tobacco Use    Smoking status: Never Smoker    Smokeless tobacco: Never Used   Substance and Sexual Activity    Alcohol use: No    Drug use: No    Sexual activity: Not Currently       ROS   Review of Systems   Constitutional: Negative for chills and fever.   HENT: Negative for congestion, rhinorrhea and sore throat.    Respiratory: Positive for cough and shortness of breath.    Cardiovascular: Negative for chest pain and leg swelling.   Gastrointestinal: Negative for abdominal pain, diarrhea, nausea and vomiting.   Musculoskeletal: Negative for back pain, neck pain and neck stiffness.   Skin: Negative for rash and wound.   Neurological: Negative for dizziness, light-headedness, numbness and headaches.   All other systems reviewed and are negative.    Physical Exam      Initial Vitals [03/06/19 1130]   BP Pulse Resp Temp SpO2   (!) 160/90 83 (!) 26 99 °F (37.2 °C) (!) 86 %      MAP       --          Physical Exam  Nursing Notes and Vital Signs Reviewed.  Constitutional: Patient is in no acute distress. Well-developed. Elderly. Frail. Chronically ill-appearing.  Head: Atraumatic. Normocephalic.  Eyes: PERRL. EOM intact. Conjunctivae are not pale. No scleral icterus.  ENT: Mucous membranes are moist. Oropharynx is clear and symmetric.  Pt is hard of hearing.  Neck: Supple. Full ROM. No lymphadenopathy.  Cardiovascular: Regular rate. Regular rhythm. No murmurs, rubs,  or gallops. Distal pulses are 2+ and symmetric.  Pulmonary/Chest: No respiratory distress. Rhonchi and expiratory wheezes bilaterally. Active cough. No rales.  Abdominal: Soft and non-distended.  There is no tenderness.  No rebound, guarding, or rigidity. Good bowel sounds.  Musculoskeletal: Moves all extremities. No obvious deformities. 2+ BLE edema.   Skin: Warm and dry.  Neurological:  Alert, awake, and appropriate.  Normal speech.  No acute focal neurological deficits are appreciated.  Psychiatric: Normal affect. Good eye contact. Appropriate in content.    ED Course    Critical Care  Date/Time: 3/6/2019 1:36 PM  Performed by: Linda Wilson MD  Authorized by: Linda Wilson MD   Direct patient critical care time: 16 minutes  Additional history critical care time: 7 minutes  Ordering / reviewing critical care time: 6 minutes  Documentation critical care time: 7 minutes  Consulting other physicians critical care time: 9 minutes  Total critical care time (exclusive of procedural time) : 45 minutes  Critical care time was exclusive of separately billable procedures and treating other patients and teaching time.  Critical care was necessary to treat or prevent imminent or life-threatening deterioration of the following conditions: respiratory failure (hypoxemic respiratory failure).  Critical care was time spent personally by me on the following activities: blood draw for specimens, development of treatment plan with patient or surrogate, discussions with consultants, interpretation of cardiac output measurements, evaluation of patient's response to treatment, examination of patient, obtaining history from patient or surrogate, ordering and performing treatments and interventions, ordering and review of laboratory studies, ordering and review of radiographic studies, pulse oximetry, re-evaluation of patient's condition and review of old charts.        ED Vital Signs:  Vitals:    03/06/19 1130 03/06/19  1153 03/06/19 1212 03/06/19 1215   BP: (!) 160/90   (!) 168/79   Pulse: 83 86 80 87   Resp: (!) 26  (!) 22 (!) 22   Temp: 99 °F (37.2 °C)      TempSrc: Oral      SpO2: (!) 86%  95% 98%   Weight:  79 kg (174 lb 4 oz)      03/06/19 1216 03/06/19 1217 03/06/19 1232   BP:  (!) 164/77 (!) 158/75   Pulse: 80 83 89   Resp: (!) 22 (!) 30 (!) 30   Temp:   98.1 °F (36.7 °C)   TempSrc:      SpO2: 98% 98% (!) 94%   Weight:          Abnormal Lab Results:  Labs Reviewed   CBC W/ AUTO DIFFERENTIAL - Abnormal; Notable for the following components:       Result Value    WBC 22.73 (*)      (*)     MCH 33.7 (*)     Platelets 400 (*)     Gran% 82.0 (*)     Lymph% 10.0 (*)     Mono% 3.0 (*)     All other components within normal limits   COMPREHENSIVE METABOLIC PANEL - Abnormal; Notable for the following components:    CO2 33 (*)     Albumin 2.8 (*)     Alkaline Phosphatase 159 (*)     All other components within normal limits   APTT - Abnormal; Notable for the following components:    aPTT 32.8 (*)     All other components within normal limits   B-TYPE NATRIURETIC PEPTIDE - Abnormal; Notable for the following components:     (*)     All other components within normal limits   TROPONIN I - Abnormal; Notable for the following components:    Troponin I 0.047 (*)     All other components within normal limits   ISTAT PROCEDURE - Abnormal; Notable for the following components:    POC PCO2 58.5 (*)     POC PO2 52 (*)     POC HCO3 33.9 (*)     POC SATURATED O2 85 (*)     All other components within normal limits   INFLUENZA A & B BY MOLECULAR   CULTURE, BLOOD   CULTURE, BLOOD   LACTIC ACID, PLASMA   PROTIME-INR   PROCALCITONIN   MAGNESIUM   PHOSPHORUS   LACTIC ACID, PLASMA   URINALYSIS, REFLEX TO URINE CULTURE        All Lab Results:  Results for orders placed or performed during the hospital encounter of 03/06/19   Influenza A & B by Molecular   Result Value Ref Range    Influenza A, Molecular Negative Negative    Influenza B,  Molecular Negative Negative    Flu A & B Source Nasal swab    CBC auto differential   Result Value Ref Range    WBC 22.73 (H) 3.90 - 12.70 K/uL    RBC 4.13 4.00 - 5.40 M/uL    Hemoglobin 13.9 12.0 - 16.0 g/dL    Hematocrit 42.9 37.0 - 48.5 %     (H) 82 - 98 fL    MCH 33.7 (H) 27.0 - 31.0 pg    MCHC 32.4 32.0 - 36.0 g/dL    RDW 14.3 11.5 - 14.5 %    Platelets 400 (H) 150 - 350 K/uL    MPV 9.2 9.2 - 12.9 fL    Lymph # CANCELED 1.0 - 4.8 K/uL    Mono # CANCELED 0.3 - 1.0 K/uL    Eos # CANCELED 0.0 - 0.5 K/uL    Baso # CANCELED 0.00 - 0.20 K/uL    Gran% 82.0 (H) 38.0 - 73.0 %    Lymph% 10.0 (L) 18.0 - 48.0 %    Mono% 3.0 (L) 4.0 - 15.0 %    Eosinophil% 1.0 0.0 - 8.0 %    Basophil% 0.0 0.0 - 1.9 %    Myelocytes 4.0 %    Poik Slight     Ovalocytes Occasional     Tear Drop Cells Occasional     Stomatocytes Present     Differential Method Manual    Comprehensive metabolic panel   Result Value Ref Range    Sodium 139 136 - 145 mmol/L    Potassium 3.6 3.5 - 5.1 mmol/L    Chloride 98 95 - 110 mmol/L    CO2 33 (H) 23 - 29 mmol/L    Glucose 110 70 - 110 mg/dL    BUN, Bld 17 10 - 30 mg/dL    Creatinine 0.8 0.5 - 1.4 mg/dL    Calcium 9.8 8.7 - 10.5 mg/dL    Total Protein 7.1 6.0 - 8.4 g/dL    Albumin 2.8 (L) 3.5 - 5.2 g/dL    Total Bilirubin 0.4 0.1 - 1.0 mg/dL    Alkaline Phosphatase 159 (H) 55 - 135 U/L    AST 22 10 - 40 U/L    ALT 20 10 - 44 U/L    Anion Gap 8 8 - 16 mmol/L    eGFR if African American >60 >60 mL/min/1.73 m^2    eGFR if non African American >60 >60 mL/min/1.73 m^2   Lactic acid, plasma #1   Result Value Ref Range    Lactate (Lactic Acid) 0.8 0.5 - 2.2 mmol/L   Protime-INR   Result Value Ref Range    Prothrombin Time 10.9 9.0 - 12.5 sec    INR 1.0 0.8 - 1.2   APTT   Result Value Ref Range    aPTT 32.8 (H) 21.0 - 32.0 sec   Brain natriuretic peptide   Result Value Ref Range     (H) 0 - 99 pg/mL   Troponin I   Result Value Ref Range    Troponin I 0.047 (H) 0.000 - 0.026 ng/mL   Procalcitonin   Result  Value Ref Range    Procalcitonin 0.13 <0.25 ng/mL   Magnesium   Result Value Ref Range    Magnesium 1.9 1.6 - 2.6 mg/dL   Phosphorus   Result Value Ref Range    Phosphorus 2.9 2.7 - 4.5 mg/dL   ISTAT PROCEDURE   Result Value Ref Range    POC PH 7.370 7.35 - 7.45    POC PCO2 58.5 (HH) 35 - 45 mmHg    POC PO2 52 (LL) 80 - 100 mmHg    POC HCO3 33.9 (H) 24 - 28 mmol/L    POC BE 9 -2 to 2 mmol/L    POC SATURATED O2 85 (L) 95 - 100 %    Sample ARTERIAL     Site LR     Allens Test Pass        Imaging Results:  Imaging Results          X-Ray Chest AP Portable (Final result)  Result time 03/06/19 12:15:59    Final result by OLEGARIO Sotelo Sr., MD (03/06/19 12:15:59)                 Impression:      1. The size of the heart is prominent. There has been interval development of a mild amount of interstitial opacities seen in both lungs with Kerley B-lines visualized bilaterally.  This is characteristic of pulmonary edema.  2. The left costophrenic angle is opacified.  This is characteristic of a tiny pleural effusion.  .      Electronically signed by: Daniel Sotelo MD  Date:    03/06/2019  Time:    12:15             Narrative:    EXAMINATION:  XR CHEST AP PORTABLE    CLINICAL HISTORY:  Sepsis;    COMPARISON:  12/17/2018    FINDINGS:  The size of the heart is prominent.  There has been interval development of a mild amount of interstitial opacities seen in both lungs with Kerley B-lines visualized bilaterally.  There is no pneumothorax.  The right costophrenic angle sharp.  The left costophrenic angle is opacified.                                 The EKG was ordered, reviewed, and independently interpreted by the ED provider.  Interpretation time: 1148  Rate: 87 BPM  Rhythm: normal sinus rhythm and PAC  Interpretation: L anterior fascicular block. No STEMI.             The Emergency Provider reviewed the vital signs and test results, which are outlined above.    ED Discussion     1:25 PM: Re-evaluated pt. I have discussed  test results, shared treatment plan, and the need for admission with patient and family at bedside. Pt and family express understanding at this time and agree with all information. All questions answered. Pt and family have no further questions or concerns at this time. Pt is ready for admit.    1:35 PM: Discussed case with Candy Lovett NP ([facility]). Dr. Mcgovern agrees with current care and management of pt and accepts admission.   Admitting Service: Hospital medicine   Admitting Physician: Dr. Mcgovern  Admit to: Telemetry      ED Medication(s):  Medications   albuterol-ipratropium 2.5 mg-0.5 mg/3 mL nebulizer solution 3 mL (3 mLs Nebulization Given by Other 3/6/19 1216)   furosemide injection 20 mg (20 mg Intravenous Given 3/6/19 1335)   aspirin tablet 325 mg (325 mg Oral Given 3/6/19 1335)          Medication List      ASK your doctor about these medications    escitalopram oxalate 20 MG tablet  Commonly known as:  LEXAPRO  Take 1 tablet (20 mg total) by mouth once daily.     niacin 100 MG Tab     VITAMINS AND MINERALS ORAL     XANAX 0.25 MG tablet  Generic drug:  ALPRAZolam                  Medical Decision Making    Medical Decision Making:   Clinical Tests:   Lab Tests: Ordered and Reviewed  Radiological Study: Ordered and Reviewed  Medical Tests: Ordered and Reviewed           Scribe Attestation:   Scribe #1: I performed the above scribed service and the documentation accurately describes the services I performed. I attest to the accuracy of the note 03/06/2019.    Attending:   Physician Attestation Statement for Scribe #1: I, Linda Wilson MD, personally performed the services described in this documentation, as scribed by Corinne Mack, in my presence, and it is both accurate and complete.          Clinical Impression       ICD-10-CM ICD-9-CM   1. Acute hypoxemic respiratory failure J96.01 518.81   2. SOB (shortness of breath) R06.02 786.05   3. Shortness of breath R06.02 786.05   4. Acute  pulmonary edema J81.0 518.4   5. Elevated troponin R74.8 790.6       Disposition:   Disposition: Admitted  Condition: Tao Wilson MD  03/06/19 7217

## 2019-03-06 NOTE — ED NOTES
RN placed pt on bedpan. She was able to void.   Pt lying in bed in NAD,VSS,RR equal and unlabored. Bed is low, locked, and call light in reach. Side rails up x 2.  Son at bedside.

## 2019-03-06 NOTE — ASSESSMENT & PLAN NOTE
She appears to be in acute failure but unclear if systolic/diastolic or combined dysfunction.  Will check 2D echo, she has been given lasix and from her history no known risk factors other than possible PNA vs PE  F/u echo and obtain stat CTA and given she is frail and has been given lasix will closely monitor her renal function as didn't want to obtain contrast but must given her history, empiric abx given she has an elevated WBC of 22K.

## 2019-03-06 NOTE — H&P
[facility] Hospital Medicine  History & Physical    Patient Name: Ashley Koenig  MRN: [id number]  Admission Date: 3/6/2019  Attending Physician: Darling Mcgovern MD   Primary Care Provider: Maggy Ngo MD         Patient information was obtained from patient, relative(s) and ER records.     Subjective:     Principal Problem:Acute hypoxemic respiratory failure    Chief Complaint:   Chief Complaint   Patient presents with    Pneumonia     pt has had cough x 1 week; diagnosed with pneumonia at urgent care. Pt also has increased weakness and had fall last night        HPI: Ms. Koenig is a 89 yo fairly healthy white female who presented to the ED with her son complaining of SOB associated with a cough. Per the son this has been ongoing for about 1 week. Upon presentation she was found to be in some respiratory distress and hypoxia with oxygen saturation at 86%. She also was found down by family members but denies any LOC.     Chest Xray shows prominent size heart with interstitial opacities of both lungs with Carlee B-lines. Per the patient and her son there is no known history of CHF but he does give a history of PE which from history doesn't sound like it was provoked.    Past Medical History:   Diagnosis Date    Age-related macular degeneration, wet, both eyes     Anginal equivalent     Anxiety     Arthritis     BPPV (benign paroxysmal positional vertigo)     Brain tumor     Clotting disorder     lung    COPD (chronic obstructive pulmonary disease)     Depression     Diastolic dysfunction     Hearing loss     Heart disease     Hyperlipidemia     Hypertension     Hypoxemia     Lung nodule     Meningioma     Multiple thyroid nodules     Pulmonary embolism     Pulmonary hypertension     Sleep apnea in adult     Stroke     per eye exam, never been treated for    Urinary incontinence     Vitamin D deficiency        Past Surgical History:   Procedure Laterality Date     CATARACT EXTRACTION Bilateral     TOTAL HIP ARTHROPLASTY Right 08/20/2016    aafter fx       Review of patient's allergies indicates:  No Known Allergies    No current facility-administered medications on file prior to encounter.      Current Outpatient Medications on File Prior to Encounter   Medication Sig    escitalopram oxalate (LEXAPRO) 20 MG tablet Take 1 tablet (20 mg total) by mouth once daily.    MULTIVITAMIN,THER AND MINERALS (VITAMINS AND MINERALS ORAL) Take by mouth once daily.    niacin 100 MG Tab Take 100 mg by mouth every evening.    alprazolam (XANAX) 0.25 MG tablet Take 0.25 mg by mouth 2 (two) times daily as needed for Anxiety.     Family History     Problem Relation (Age of Onset)    Alzheimer's disease Father, Daughter    No Known Problems Son        Tobacco Use    Smoking status: Never Smoker    Smokeless tobacco: Never Used   Substance and Sexual Activity    Alcohol use: No    Drug use: No    Sexual activity: Not Currently     Review of Systems   Constitutional: Negative.    HENT: Negative.    Eyes: Negative.    Respiratory: Positive for cough and shortness of breath.    Cardiovascular: Negative.    Gastrointestinal: Negative.    Endocrine: Negative.    Genitourinary: Negative.    Musculoskeletal: Negative.    Skin: Negative.    Allergic/Immunologic: Negative.    Neurological: Negative.    Hematological: Negative.    Psychiatric/Behavioral: Negative.      Objective:     Vital Signs (Most Recent):  Temp: 98.7 °F (37.1 °C) (03/06/19 1526)  Pulse: 90 (03/06/19 1526)  Resp: (!) 44 (03/06/19 1526)  BP: (!) 158/77 (03/06/19 1526)  SpO2: (!) 93 % (03/06/19 1526) Vital Signs (24h Range):  Temp:  [98.1 °F (36.7 °C)-99 °F (37.2 °C)] 98.7 °F (37.1 °C)  Pulse:  [80-91] 90  Resp:  [20-44] 44  SpO2:  [86 %-98 %] 93 %  BP: (156-168)/(71-90) 158/77     Weight: 79 kg (174 lb 4 oz)  Body mass index is 27.35 kg/m².    Physical Exam   Constitutional: She is oriented to person, place, and time. She  appears well-developed.   Frail appearing female   HENT:   Head: Normocephalic and atraumatic.   Right Ear: External ear normal.   Left Ear: External ear normal.   Nose: Nose normal.   Mouth/Throat: Oropharynx is clear and moist.   Eyes: Conjunctivae and EOM are normal. Pupils are equal, round, and reactive to light.   Neck: Normal range of motion. Neck supple.   Cardiovascular: Normal rate, regular rhythm, normal heart sounds and intact distal pulses.   Pulmonary/Chest: Tachypnea noted. She has wheezes. She has rhonchi.   Abdominal: Soft. Bowel sounds are normal.   Genitourinary: Vagina normal.   Musculoskeletal: Normal range of motion.   Neurological: She is alert and oriented to person, place, and time.   Skin: Skin is warm and dry. Capillary refill takes less than 2 seconds.   Psychiatric: She has a normal mood and affect.   Nursing note and vitals reviewed.        CRANIAL NERVES     CN III, IV, VI   Pupils are equal, round, and reactive to light.  Extraocular motions are normal.        Significant Labs:   Recent Lab Results       03/06/19  1325   03/06/19  1211   03/06/19  1210        Influenza A, Molecular     Negative     Influenza B, Molecular     Negative     Procalcitonin     0.13  Comment:  A concentration < 0.25 ng/mL represents a low risk bacterial   infection.  Procalcitonin may not be accurate among patients with localized   infection, recent trauma or major surgery, immunosuppressed state,   invasive fungal infection, renal dysfunction. Decisions regarding   initiation or continuation of antibiotic therapy should not be based   solely on procalcitonin levels.       Albumin     2.8     Alkaline Phosphatase     159     Allens Test   Pass       ALT     20     Anion Gap     8     Appearance, UA Clear         aPTT     32.8  Comment:  aPTT therapeutic range = 39-69 seconds     AST     22     Bacteria, UA Moderate         Baso #     CANCELED  Comment:  Result canceled by the ancillary.     Basophil%     0.0      Bilirubin (UA) Negative         Total Bilirubin     0.4  Comment:  For infants and newborns, interpretation of results should be based  on gestational age, weight and in agreement with clinical  observations.  Premature Infant recommended reference ranges:  Up to 24 hours.............<8.0 mg/dL  Up to 48 hours............<12.0 mg/dL  3-5 days..................<15.0 mg/dL  6-29 days.................<15.0 mg/dL       BNP     326  Comment:  Values of less than 100 pg/ml are consistent with non-CHF populations.     Site   LR       BUN, Bld     17     Calcium     9.8     Chloride     98     CO2     33     Color, UA Yellow         Creatinine     0.8     Differential Method     Manual     eGFR if      >60     eGFR if non      >60  Comment:  Calculation used to obtain the estimated glomerular filtration  rate (eGFR) is the CKD-EPI equation.        Eos #     CANCELED  Comment:  Result canceled by the ancillary.     Eosinophil%     1.0     Flu A & B Source     Nasal swab     Glucose     110     Glucose, UA Negative         Gran%     82.0     Hematocrit     42.9     Hemoglobin     13.9     Hyaline Casts, UA 0         Coumadin Monitoring INR     1.0  Comment:  Coumadin Therapy:  2.0 - 3.0 for INR for all indicators except mechanical heart valves  and antiphospholipid syndromes which should use 2.5 - 3.5.       Ketones, UA Negative         Lactate, Shahab     0.8  Comment:  Falsely low lactic acid results can be found in samples   containing >=13.0 mg/dL total bilirubin and/or >=3.5 mg/dL   direct bilirubin.       Leukocytes, UA Negative         Lymph #     CANCELED  Comment:  Result canceled by the ancillary.     Lymph%     10.0     Magnesium     1.9     MCH     33.7     MCHC     32.4     MCV     104     Microscopic Comment SEE COMMENT  Comment:  Other formed elements not mentioned in the report are not   present in the microscopic examination.            Mono #     CANCELED  Comment:  Result  canceled by the ancillary.     Mono%     3.0     MPV     9.2     Myelocytes     4.0     Nitrite, UA Positive         Occult Blood UA Negative         Ovalocytes     Occasional     pH, UA 6.0         Phosphorus     2.9     Platelets     400     POC BE   9       POC HCO3   33.9       POC PCO2   58.5       POC PH   7.370       POC PO2   52       POC SATURATED O2   85       Poik     Slight     Potassium     3.6     Total Protein     7.1     Protein, UA 2+  Comment:  Recommend a 24 hour urine protein or a urine   protein/creatinine ratio if globulin induced proteinuria is  clinically suspected.           Protime     10.9     RBC     4.13     RBC, UA 2         RDW     14.3     Sample   ARTERIAL       Sodium     139     Specific Gravity, UA >=1.030         Specimen UA Urine, Catheterized         Stomatocytes     Present     Tear Drop Cells     Occasional     Troponin I     0.047  Comment:  The reference interval for Troponin I represents the 99th percentile   cutoff   for our facility and is consistent with 3rd generation assay   performance.       Urobilinogen, UA Negative         WBC, UA 1         WBC     22.73           Significant Imaging:   Imaging Results          X-Ray Chest AP Portable (Final result)  Result time 03/06/19 12:15:59    Final result by OLEGARIO Sotelo Sr., MD (03/06/19 12:15:59)                 Impression:      1. The size of the heart is prominent. There has been interval development of a mild amount of interstitial opacities seen in both lungs with Kerley B-lines visualized bilaterally.  This is characteristic of pulmonary edema.  2. The left costophrenic angle is opacified.  This is characteristic of a tiny pleural effusion.  .      Electronically signed by: Daniel Sotelo MD  Date:    03/06/2019  Time:    12:15             Narrative:    EXAMINATION:  XR CHEST AP PORTABLE    CLINICAL HISTORY:  Sepsis;    COMPARISON:  12/17/2018    FINDINGS:  The size of the heart is prominent.  There has been  interval development of a mild amount of interstitial opacities seen in both lungs with Kerley B-lines visualized bilaterally.  There is no pneumothorax.  The right costophrenic angle sharp.  The left costophrenic angle is opacified.                                  Assessment/Plan:     * Acute hypoxemic respiratory failure    Given her presentation she has been given supplemental oxygen with sats up to 93%  I and concerned she could possible have another PE as she had in 2014 which appears to have been unprovoked and at the time recommended long term anticoagulation.         Acute congestive heart failure    She appears to be in acute failure but unclear if systolic/diastolic or combined dysfunction.  Will check 2D echo, she has been given lasix and from her history no known risk factors other than possible PNA vs PE  F/u echo and obtain stat CTA and given she is frail and has been given lasix will closely monitor her renal function as didn't want to obtain contrast but must given her history, empiric abx given she has an elevated WBC of 22K.     SIRS (systemic inflammatory response syndrome)    She presented with elevated WBC, HR, and hypoxia with no obvious source of infection only asymptomatic bacteruria which can be cause of her Leukocytosis but underlying PNA not completely excluded so will cover with Rocephin       Bacteriuria    She denies any symptoms but given her elevated WBC and UA cannot exclude as I feel her vitals and presentation related more to Respiratory.       Anxiety    Will resume her Xanax         VTE Risk Mitigation (From admission, onward)        Ordered     enoxaparin injection 40 mg  Daily      03/06/19 1358     IP VTE HIGH RISK PATIENT  Once      03/06/19 1530     Place sequential compression device  Until discontinued      03/06/19 1530             Darling Mcgovern MD  Department of Hospital Medicine   [facility]

## 2019-03-06 NOTE — SUBJECTIVE & OBJECTIVE
Past Medical History:   Diagnosis Date    Age-related macular degeneration, wet, both eyes     Anginal equivalent     Anxiety     Arthritis     BPPV (benign paroxysmal positional vertigo)     Brain tumor     Clotting disorder     lung    COPD (chronic obstructive pulmonary disease)     Depression     Diastolic dysfunction     Hearing loss     Heart disease     Hyperlipidemia     Hypertension     Hypoxemia     Lung nodule     Meningioma     Multiple thyroid nodules     Pulmonary embolism     Pulmonary hypertension     Sleep apnea in adult     Stroke     per eye exam, never been treated for    Urinary incontinence     Vitamin D deficiency        Past Surgical History:   Procedure Laterality Date    CATARACT EXTRACTION Bilateral     TOTAL HIP ARTHROPLASTY Right 08/20/2016    [facility]       Review of patient's allergies indicates:  No Known Allergies    No current facility-administered medications on file prior to encounter.      Current Outpatient Medications on File Prior to Encounter   Medication Sig    escitalopram oxalate (LEXAPRO) 20 MG tablet Take 1 tablet (20 mg total) by mouth once daily.    MULTIVITAMIN,THER AND MINERALS (VITAMINS AND MINERALS ORAL) Take by mouth once daily.    niacin 100 MG Tab Take 100 mg by mouth every evening.    alprazolam (XANAX) 0.25 MG tablet Take 0.25 mg by mouth 2 (two) times daily as needed for Anxiety.     Family History     Problem Relation (Age of Onset)    Alzheimer's disease Father, Daughter    No Known Problems Son        Tobacco Use    Smoking status: Never Smoker    Smokeless tobacco: Never Used   Substance and Sexual Activity    Alcohol use: No    Drug use: No    Sexual activity: Not Currently     Review of Systems   Constitutional: Negative.    HENT: Negative.    Eyes: Negative.    Respiratory: Positive for cough and shortness of breath.    Cardiovascular: Negative.    Gastrointestinal: Negative.    Endocrine: Negative.    Genitourinary:  Negative.    Musculoskeletal: Negative.    Skin: Negative.    Allergic/Immunologic: Negative.    Neurological: Negative.    Hematological: Negative.    Psychiatric/Behavioral: Negative.      Objective:     Vital Signs (Most Recent):  Temp: 98.7 °F (37.1 °C) (03/06/19 1526)  Pulse: 90 (03/06/19 1526)  Resp: (!) 44 (03/06/19 1526)  BP: (!) 158/77 (03/06/19 1526)  SpO2: (!) 93 % (03/06/19 1526) Vital Signs (24h Range):  Temp:  [98.1 °F (36.7 °C)-99 °F (37.2 °C)] 98.7 °F (37.1 °C)  Pulse:  [80-91] 90  Resp:  [20-44] 44  SpO2:  [86 %-98 %] 93 %  BP: (156-168)/(71-90) 158/77     Weight: 79 kg (174 lb 4 oz)  Body mass index is 27.35 kg/m².    Physical Exam   Constitutional: She is oriented to person, place, and time. She appears well-developed.   Frail appearing female   HENT:   Head: Normocephalic and atraumatic.   Right Ear: External ear normal.   Left Ear: External ear normal.   Nose: Nose normal.   Mouth/Throat: Oropharynx is clear and moist.   Eyes: Conjunctivae and EOM are normal. Pupils are equal, round, and reactive to light.   Neck: Normal range of motion. Neck supple.   Cardiovascular: Normal rate, regular rhythm, normal heart sounds and intact distal pulses.   Pulmonary/Chest: Tachypnea noted. She has wheezes. She has rhonchi.   Abdominal: Soft. Bowel sounds are normal.   Genitourinary: Vagina normal.   Musculoskeletal: Normal range of motion.   Neurological: She is alert and oriented to person, place, and time.   Skin: Skin is warm and dry. Capillary refill takes less than 2 seconds.   Psychiatric: She has a normal mood and affect.   Nursing note and vitals reviewed.        CRANIAL NERVES     CN III, IV, VI   Pupils are equal, round, and reactive to light.  Extraocular motions are normal.        Significant Labs:   Recent Lab Results       03/06/19  1325   03/06/19  1211   03/06/19  1210        Influenza A, Molecular     Negative     Influenza B, Molecular     Negative     Procalcitonin     0.13  Comment:  A  concentration < 0.25 ng/mL represents a low risk bacterial   infection.  Procalcitonin may not be accurate among patients with localized   infection, recent trauma or major surgery, immunosuppressed state,   invasive fungal infection, renal dysfunction. Decisions regarding   initiation or continuation of antibiotic therapy should not be based   solely on procalcitonin levels.       Albumin     2.8     Alkaline Phosphatase     159     Allens Test   Pass       ALT     20     Anion Gap     8     Appearance, UA Clear         aPTT     32.8  Comment:  aPTT therapeutic range = 39-69 seconds     AST     22     Bacteria, UA Moderate         Baso #     CANCELED  Comment:  Result canceled by the ancillary.     Basophil%     0.0     Bilirubin (UA) Negative         Total Bilirubin     0.4  Comment:  For infants and newborns, interpretation of results should be based  on gestational age, weight and in agreement with clinical  observations.  Premature Infant recommended reference ranges:  Up to 24 hours.............<8.0 mg/dL  Up to 48 hours............<12.0 mg/dL  3-5 days..................<15.0 mg/dL  6-29 days.................<15.0 mg/dL       BNP     326  Comment:  Values of less than 100 pg/ml are consistent with non-CHF populations.     Site   LR       BUN, Bld     17     Calcium     9.8     Chloride     98     CO2     33     Color, UA Yellow         Creatinine     0.8     Differential Method     Manual     eGFR if      >60     eGFR if non      >60  Comment:  Calculation used to obtain the estimated glomerular filtration  rate (eGFR) is the CKD-EPI equation.        Eos #     CANCELED  Comment:  Result canceled by the ancillary.     Eosinophil%     1.0     Flu A & B Source     Nasal swab     Glucose     110     Glucose, UA Negative         Gran%     82.0     Hematocrit     42.9     Hemoglobin     13.9     Hyaline Casts, UA 0         Coumadin Monitoring INR     1.0  Comment:  Coumadin  Therapy:  2.0 - 3.0 for INR for all indicators except mechanical heart valves  and antiphospholipid syndromes which should use 2.5 - 3.5.       Ketones, UA Negative         Lactate, Shahab     0.8  Comment:  Falsely low lactic acid results can be found in samples   containing >=13.0 mg/dL total bilirubin and/or >=3.5 mg/dL   direct bilirubin.       Leukocytes, UA Negative         Lymph #     CANCELED  Comment:  Result canceled by the ancillary.     Lymph%     10.0     Magnesium     1.9     MCH     33.7     MCHC     32.4     MCV     104     Microscopic Comment SEE COMMENT  Comment:  Other formed elements not mentioned in the report are not   present in the microscopic examination.            Mono #     CANCELED  Comment:  Result canceled by the ancillary.     Mono%     3.0     MPV     9.2     Myelocytes     4.0     Nitrite, UA Positive         Occult Blood UA Negative         Ovalocytes     Occasional     pH, UA 6.0         Phosphorus     2.9     Platelets     400     POC BE   9       POC HCO3   33.9       POC PCO2   58.5       POC PH   7.370       POC PO2   52       POC SATURATED O2   85       Poik     Slight     Potassium     3.6     Total Protein     7.1     Protein, UA 2+  Comment:  Recommend a 24 hour urine protein or a urine   protein/creatinine ratio if globulin induced proteinuria is  clinically suspected.           Protime     10.9     RBC     4.13     RBC, UA 2         RDW     14.3     Sample   ARTERIAL       Sodium     139     Specific Gravity, UA >=1.030         Specimen UA Urine, Catheterized         Stomatocytes     Present     Tear Drop Cells     Occasional     Troponin I     0.047  Comment:  The reference interval for Troponin I represents the 99th percentile   cutoff   for our facility and is consistent with 3rd generation assay   performance.       Urobilinogen, UA Negative         WBC, UA 1         WBC     22.73           Significant Imaging:   Imaging Results          X-Ray Chest AP Portable (Final  result)  Result time 03/06/19 12:15:59    Final result by OLEGARIO Sotelo Sr., MD (03/06/19 12:15:59)                 Impression:      1. The size of the heart is prominent. There has been interval development of a mild amount of interstitial opacities seen in both lungs with Kerley B-lines visualized bilaterally.  This is characteristic of pulmonary edema.  2. The left costophrenic angle is opacified.  This is characteristic of a tiny pleural effusion.  .      Electronically signed by: Daniel Sotelo MD  Date:    03/06/2019  Time:    12:15             Narrative:    EXAMINATION:  XR CHEST AP PORTABLE    CLINICAL HISTORY:  Sepsis;    COMPARISON:  12/17/2018    FINDINGS:  The size of the heart is prominent.  There has been interval development of a mild amount of interstitial opacities seen in both lungs with Kerley B-lines visualized bilaterally.  There is no pneumothorax.  The right costophrenic angle sharp.  The left costophrenic angle is opacified.

## 2019-03-06 NOTE — ASSESSMENT & PLAN NOTE
Given her presentation she has been given supplemental oxygen with sats up to 93%  I and concerned she could possible have another PE as she had in 2014 which appears to have been unprovoked and at the time recommended long term anticoagulation.

## 2019-03-06 NOTE — ED NOTES
Attempted to collect urine sample. Pt state that she went to the bathroom just prior to arrival. Will consult with MD if ok for PO fluids. Will attempt to collect urine sample later.

## 2019-03-07 PROBLEM — A41.9 SEPSIS DUE TO PNEUMONIA: Status: ACTIVE | Noted: 2019-03-06

## 2019-03-07 PROBLEM — J18.9 SEPSIS DUE TO PNEUMONIA: Status: ACTIVE | Noted: 2019-03-06

## 2019-03-07 PROBLEM — N30.00 ACUTE CYSTITIS WITHOUT HEMATURIA: Status: ACTIVE | Noted: 2019-03-06

## 2019-03-07 LAB
ALLENS TEST: ABNORMAL
ALLENS TEST: ABNORMAL
ANION GAP SERPL CALC-SCNC: 10 MMOL/L
BASOPHILS NFR BLD: 0 %
BUN SERPL-MCNC: 14 MG/DL
CALCIUM SERPL-MCNC: 9.5 MG/DL
CHLORIDE SERPL-SCNC: 95 MMOL/L
CO2 SERPL-SCNC: 34 MMOL/L
CREAT SERPL-MCNC: 0.7 MG/DL
DACRYOCYTES BLD QL SMEAR: ABNORMAL
DELSYS: ABNORMAL
DELSYS: ABNORMAL
DIFFERENTIAL METHOD: ABNORMAL
EOSINOPHIL NFR BLD: 1 %
EP: 8
ERYTHROCYTE [DISTWIDTH] IN BLOOD BY AUTOMATED COUNT: 14.3 %
ERYTHROCYTE [SEDIMENTATION RATE] IN BLOOD BY WESTERGREN METHOD: 24 MM/H
EST. GFR  (AFRICAN AMERICAN): >60 ML/MIN/1.73 M^2
EST. GFR  (NON AFRICAN AMERICAN): >60 ML/MIN/1.73 M^2
FIO2: 45
FIO2: 50
FLOW: 15
GLUCOSE SERPL-MCNC: 93 MG/DL
HCO3 UR-SCNC: 39.7 MMOL/L (ref 24–28)
HCO3 UR-SCNC: 40.3 MMOL/L (ref 24–28)
HCT VFR BLD AUTO: 40.4 %
HGB BLD-MCNC: 12.8 G/DL
IP: 14
LYMPHOCYTES NFR BLD: 7 %
MCH RBC QN AUTO: 33.2 PG
MCHC RBC AUTO-ENTMCNC: 31.7 G/DL
MCV RBC AUTO: 105 FL
MODE: ABNORMAL
MODE: ABNORMAL
MONOCYTES NFR BLD: 7 %
MYELOCYTES NFR BLD MANUAL: 1 %
NEUTROPHILS NFR BLD: 84 %
OVALOCYTES BLD QL SMEAR: ABNORMAL
PCO2 BLDA: 76.5 MMHG (ref 35–45)
PCO2 BLDA: 76.7 MMHG (ref 35–45)
PH SMN: 7.32 [PH] (ref 7.35–7.45)
PH SMN: 7.33 [PH] (ref 7.35–7.45)
PLATELET # BLD AUTO: 397 K/UL
PLATELET BLD QL SMEAR: ABNORMAL
PMV BLD AUTO: 9.1 FL
PO2 BLDA: 131 MMHG (ref 80–100)
PO2 BLDA: 97 MMHG (ref 80–100)
POC BE: 14 MMOL/L
POC BE: 14 MMOL/L
POC SATURATED O2: 96 % (ref 95–100)
POC SATURATED O2: 98 % (ref 95–100)
POIKILOCYTOSIS BLD QL SMEAR: SLIGHT
POTASSIUM SERPL-SCNC: 3.5 MMOL/L
RBC # BLD AUTO: 3.86 M/UL
SAMPLE: ABNORMAL
SAMPLE: ABNORMAL
SITE: ABNORMAL
SITE: ABNORMAL
SODIUM SERPL-SCNC: 139 MMOL/L
SPONT RATE: 26
STOMATOCYTES BLD QL SMEAR: PRESENT
WBC # BLD AUTO: 21.56 K/UL

## 2019-03-07 PROCEDURE — 94640 AIRWAY INHALATION TREATMENT: CPT

## 2019-03-07 PROCEDURE — 99291 PR CRITICAL CARE, E/M 30-74 MINUTES: ICD-10-PCS | Mod: ,,, | Performed by: INTERNAL MEDICINE

## 2019-03-07 PROCEDURE — 94761 N-INVAS EAR/PLS OXIMETRY MLT: CPT

## 2019-03-07 PROCEDURE — 99900035 HC TECH TIME PER 15 MIN (STAT)

## 2019-03-07 PROCEDURE — 25000242 PHARM REV CODE 250 ALT 637 W/ HCPCS: Performed by: FAMILY MEDICINE

## 2019-03-07 PROCEDURE — 99291 CRITICAL CARE FIRST HOUR: CPT | Mod: ,,, | Performed by: INTERNAL MEDICINE

## 2019-03-07 PROCEDURE — 25000003 PHARM REV CODE 250: Performed by: NURSE PRACTITIONER

## 2019-03-07 PROCEDURE — 63600175 PHARM REV CODE 636 W HCPCS: Performed by: FAMILY MEDICINE

## 2019-03-07 PROCEDURE — A4216 STERILE WATER/SALINE, 10 ML: HCPCS | Performed by: FAMILY MEDICINE

## 2019-03-07 PROCEDURE — 85027 COMPLETE CBC AUTOMATED: CPT

## 2019-03-07 PROCEDURE — 36600 WITHDRAWAL OF ARTERIAL BLOOD: CPT

## 2019-03-07 PROCEDURE — 25000003 PHARM REV CODE 250: Performed by: FAMILY MEDICINE

## 2019-03-07 PROCEDURE — 82803 BLOOD GASES ANY COMBINATION: CPT

## 2019-03-07 PROCEDURE — 36415 COLL VENOUS BLD VENIPUNCTURE: CPT

## 2019-03-07 PROCEDURE — 20000000 HC ICU ROOM

## 2019-03-07 PROCEDURE — 27100092 HC HIGH FLOW DELIVERY CANNULA

## 2019-03-07 PROCEDURE — 80048 BASIC METABOLIC PNL TOTAL CA: CPT

## 2019-03-07 PROCEDURE — 27000190 HC CPAP FULL FACE MASK W/VALVE

## 2019-03-07 PROCEDURE — 85007 BL SMEAR W/DIFF WBC COUNT: CPT

## 2019-03-07 PROCEDURE — S0028 INJECTION, FAMOTIDINE, 20 MG: HCPCS | Performed by: NURSE PRACTITIONER

## 2019-03-07 PROCEDURE — 94660 CPAP INITIATION&MGMT: CPT

## 2019-03-07 PROCEDURE — 27000221 HC OXYGEN, UP TO 24 HOURS

## 2019-03-07 PROCEDURE — 27100171 HC OXYGEN HIGH FLOW UP TO 24 HOURS

## 2019-03-07 RX ORDER — FAMOTIDINE 10 MG/ML
20 INJECTION INTRAVENOUS DAILY
Status: DISCONTINUED | OUTPATIENT
Start: 2019-03-07 | End: 2019-03-08

## 2019-03-07 RX ORDER — CEFEPIME HYDROCHLORIDE 1 G/50ML
1 INJECTION, SOLUTION INTRAVENOUS
Status: DISCONTINUED | OUTPATIENT
Start: 2019-03-07 | End: 2019-03-08

## 2019-03-07 RX ADMIN — FAMOTIDINE 20 MG: 10 INJECTION, SOLUTION INTRAVENOUS at 05:03

## 2019-03-07 RX ADMIN — ENOXAPARIN SODIUM 40 MG: 100 INJECTION SUBCUTANEOUS at 05:03

## 2019-03-07 RX ADMIN — CEFEPIME HYDROCHLORIDE 1 G: 1 INJECTION, SOLUTION INTRAVENOUS at 11:03

## 2019-03-07 RX ADMIN — DOXYCYCLINE 100 MG: 100 INJECTION, POWDER, LYOPHILIZED, FOR SOLUTION INTRAVENOUS at 05:03

## 2019-03-07 RX ADMIN — IPRATROPIUM BROMIDE AND ALBUTEROL SULFATE 3 ML: .5; 3 SOLUTION RESPIRATORY (INHALATION) at 01:03

## 2019-03-07 RX ADMIN — IPRATROPIUM BROMIDE AND ALBUTEROL SULFATE 3 ML: .5; 3 SOLUTION RESPIRATORY (INHALATION) at 07:03

## 2019-03-07 RX ADMIN — CEFEPIME HYDROCHLORIDE 1 G: 1 INJECTION, SOLUTION INTRAVENOUS at 07:03

## 2019-03-07 RX ADMIN — Medication 3 ML: at 06:03

## 2019-03-07 RX ADMIN — VANCOMYCIN HYDROCHLORIDE 1250 MG: 100 INJECTION, POWDER, LYOPHILIZED, FOR SOLUTION INTRAVENOUS at 08:03

## 2019-03-07 RX ADMIN — Medication 3 ML: at 02:03

## 2019-03-07 NOTE — PROGRESS NOTES
[facility] Hospital Medicine  Progress Note    Patient Name: Ashley Koenig  MRN: [id number]  Patient Class: IP- Inpatient   Admission Date: 3/6/2019  Length of Stay: 1 days  Attending Physician: Darling Mcgovern MD  Primary Care Provider: Maggy Ngo MD        Subjective:     Principal Problem:Acute hypoxemic respiratory failure    HPI:  Ms. Koenig is a 89 yo fairly healthy white female who presented to the ED with her son complaining of SOB associated with a cough. Per the son this has been ongoing for about 1 week. Upon presentation she was found to be in some respiratory distress and hypoxia with oxygen saturation at 86%. She also was found down by family members but denies any LOC.     Chest Xray shows prominent size heart with interstitial opacities of both lungs with Carlee B-lines. Per the patient and her son there is no known history of CHF but he does give a history of PE which from history doesn't sound like it was provoked.    Hospital Course:  3/7/19: elderly female admitted yesterday with hypoxia and was thought to have decompensated diastolic dysfunction found this am very lethargic and ABG showed hypercapnia with Pco2 of 76. CTA ruled out PE but showed bilateral consolidation lungs    Interval History: 89 yo female admitted with hypoxia respiratory failure now lethargic secondary to hypercapnia. She was thought to have CHF but CTA shows bilateral consolidation and she has a WBC 21K.    Review of Systems   Unable to perform ROS: Mental status change     Objective:     Vital Signs (Most Recent):  Temp: 98.2 °F (36.8 °C) (03/07/19 0734)  Pulse: 86 (03/07/19 0735)  Resp: (!) 24 (03/07/19 0735)  BP: (!) 184/84 (03/07/19 0734)  SpO2: 96 % (03/07/19 0735) Vital Signs (24h Range):  Temp:  [98.1 °F (36.7 °C)-99.9 °F (37.7 °C)] 98.2 °F (36.8 °C)  Pulse:  [80-93] 86  Resp:  [20-44] 24  SpO2:  [85 %-98 %] 96 %  BP: (147-184)/(66-90) 184/84     Weight: 79 kg (174 lb 4 oz)  Body mass index is  27.35 kg/m².    Intake/Output Summary (Last 24 hours) at 3/7/2019 0907  Last data filed at 3/7/2019 0606  Gross per 24 hour   Intake 790 ml   Output 1100 ml   Net -310 ml      Physical Exam   Constitutional: She appears well-developed. She appears lethargic.   Sick appearing female   HENT:   Head: Normocephalic and atraumatic.   Right Ear: External ear normal.   Left Ear: External ear normal.   Nose: Nose normal.   Mouth/Throat: Oropharynx is clear and moist.   Eyes: Pupils are equal, round, and reactive to light.   Neck: Normal range of motion. Neck supple.   Cardiovascular: Normal rate, regular rhythm, normal heart sounds and intact distal pulses.   Pulmonary/Chest: Tachypnea noted.   Coarse bilaterally   Abdominal: Soft. Bowel sounds are normal.   Genitourinary: Vagina normal.   Neurological: She appears lethargic.   Skin: Skin is warm and dry. Capillary refill takes less than 2 seconds.   Nursing note and vitals reviewed.      Significant Labs:   Recent Lab Results       03/07/19  0848   03/07/19  0442   03/06/19  2222   03/06/19  1756   03/06/19  1600        Influenza A, Molecular               Influenza B, Molecular               Procalcitonin               Albumin               Alkaline Phosphatase               Allens Test N/A             ALT               Anion Gap   10           Appearance, UA               aPTT               AST               Aortic Valve Regurgitation               Bacteria, UA               Baso #               Basophil%   0.0           Bilirubin (UA)               Total Bilirubin               BNP               Site LR             BUN, Bld   14           Calcium   9.5           QEF               Chloride   95           CO2   34           Color, UA               Creatinine   0.7           Diastolic Dysfunction               DelSys Venti Mask             Differential Method   Manual           eGFR if    >60           eGFR if non     >60  Comment:  Calculation used to obtain the estimated glomerular filtration  rate (eGFR) is the CKD-EPI equation.              Eos #               Eosinophil%   1.0           FiO2 50             Flow 15             Flu A & B Source               Glucose   93           Glucose, UA               Gran%   84.0           Hematocrit   40.4           Hemoglobin   12.8           Hyaline Casts, UA               Coumadin Monitoring INR               Ketones, UA               Lactate, Shahab         1.0  Comment:  Falsely low lactic acid results can be found in samples   containing >=13.0 mg/dL total bilirubin and/or >=3.5 mg/dL   direct bilirubin.       Leukocytes, UA               Lymph #               Lymph%   7.0           Magnesium               MCH   33.2           MCHC   31.7           MCV   105           Microscopic Comment               Mode SPONT             Mono #               Mono%   7.0           MPV   9.1           Mitral Valve Regurgitation               Myelocytes   1.0           Nitrite, UA               Occult Blood UA               Ovalocytes   Occasional           Est. PA Systolic Pressure               pH, UA               Phosphorus               Platelet Estimate   Appears normal           Platelets   397           POC BE 14             POC HCO3 40.3             POC PCO2 76.7             POC PH 7.328             POC PO2 97             POC SATURATED O2 96             Poik   Slight           Potassium   3.5           Total Protein               Protein, UA               Protime               RBC   3.86           RBC, UA               RDW   14.3           Sample ARTERIAL             Sodium   139           Specific Apex, UA               Specimen UA               Stomatocytes   Present           Tear Drop Cells   Occasional           Troponin I     0.046  Comment:  The reference interval for Troponin I represents the 99th percentile   cutoff   for our facility and is consistent with 3rd generation assay    performance.   0.040  Comment:  The reference interval for Troponin I represents the 99th percentile   cutoff   for our facility and is consistent with 3rd generation assay   performance.         Tricuspid Valve Regurgitation               Urobilinogen, UA               WBC, UA               WBC   21.56                            03/06/19  1443   03/06/19  1325   03/06/19  1211   03/06/19  1210        Influenza A, Molecular       Negative     Influenza B, Molecular       Negative     Procalcitonin       0.13  Comment:  A concentration < 0.25 ng/mL represents a low risk bacterial   infection.  Procalcitonin may not be accurate among patients with localized   infection, recent trauma or major surgery, immunosuppressed state,   invasive fungal infection, renal dysfunction. Decisions regarding   initiation or continuation of antibiotic therapy should not be based   solely on procalcitonin levels.       Albumin       2.8     Alkaline Phosphatase       159     Allens Test     Pass       ALT       20     Anion Gap       8     Appearance, UA   Clear         aPTT       32.8  Comment:  aPTT therapeutic range = 39-69 seconds     AST       22     Aortic Valve Regurgitation MILD           Bacteria, UA   Moderate         Baso #       CANCELED  Comment:  Result canceled by the ancillary.     Basophil%       0.0     Bilirubin (UA)   Negative         Total Bilirubin       0.4  Comment:  For infants and newborns, interpretation of results should be based  on gestational age, weight and in agreement with clinical  observations.  Premature Infant recommended reference ranges:  Up to 24 hours.............<8.0 mg/dL  Up to 48 hours............<12.0 mg/dL  3-5 days..................<15.0 mg/dL  6-29 days.................<15.0 mg/dL       BNP       326  Comment:  Values of less than 100 pg/ml are consistent with non-CHF populations.     Site     LR       BUN, Bld       17     Calcium       9.8     QEF 60           Chloride       98      CO2       33     Color, UA   Yellow         Creatinine       0.8     Diastolic Dysfunction Yes           DelSys             Differential Method       Manual     eGFR if        >60     eGFR if non        >60  Comment:  Calculation used to obtain the estimated glomerular filtration  rate (eGFR) is the CKD-EPI equation.        Eos #       CANCELED  Comment:  Result canceled by the ancillary.     Eosinophil%       1.0     FiO2             Flow             Flu A & B Source       Nasal swab     Glucose       110     Glucose, UA   Negative         Gran%       82.0     Hematocrit       42.9     Hemoglobin       13.9     Hyaline Casts, UA   0         Coumadin Monitoring INR       1.0  Comment:  Coumadin Therapy:  2.0 - 3.0 for INR for all indicators except mechanical heart valves  and antiphospholipid syndromes which should use 2.5 - 3.5.       Ketones, UA   Negative         Lactate, Shahab       0.8  Comment:  Falsely low lactic acid results can be found in samples   containing >=13.0 mg/dL total bilirubin and/or >=3.5 mg/dL   direct bilirubin.       Leukocytes, UA   Negative         Lymph #       CANCELED  Comment:  Result canceled by the ancillary.     Lymph%       10.0     Magnesium       1.9     MCH       33.7     MCHC       32.4     MCV       104     Microscopic Comment   SEE COMMENT  Comment:  Other formed elements not mentioned in the report are not   present in the microscopic examination.            Mode             Mono #       CANCELED  Comment:  Result canceled by the ancillary.     Mono%       3.0     MPV       9.2     Mitral Valve Regurgitation TRIVIAL           Myelocytes       4.0     Nitrite, UA   Positive         Occult Blood UA   Negative         Ovalocytes       Occasional     Est. PA Systolic Pressure 40.7           pH, UA   6.0         Phosphorus       2.9     Platelet Estimate             Platelets       400     POC BE     9       POC HCO3     33.9       POC PCO2     58.5        POC PH     7.370       POC PO2     52       POC SATURATED O2     85       Poik       Slight     Potassium       3.6     Total Protein       7.1     Protein, UA   2+  Comment:  Recommend a 24 hour urine protein or a urine   protein/creatinine ratio if globulin induced proteinuria is  clinically suspected.           Protime       10.9     RBC       4.13     RBC, UA   2         RDW       14.3     Sample     ARTERIAL       Sodium       139     Specific Gravity, UA   >=1.030         Specimen UA   Urine, Catheterized         Stomatocytes       Present     Tear Drop Cells       Occasional     Troponin I       0.047  Comment:  The reference interval for Troponin I represents the 99th percentile   cutoff   for our facility and is consistent with 3rd generation assay   performance.       Tricuspid Valve Regurgitation TRIVIAL           Urobilinogen, UA   Negative         WBC, UA   1         WBC       22.73           Significant Imaging:   Imaging Results          X-Ray Chest AP Portable (Final result)  Result time 03/06/19 12:15:59    Final result by OLEGARIO Sotelo Sr., MD (03/06/19 12:15:59)                 Impression:      1. The size of the heart is prominent. There has been interval development of a mild amount of interstitial opacities seen in both lungs with Kerley B-lines visualized bilaterally.  This is characteristic of pulmonary edema.  2. The left costophrenic angle is opacified.  This is characteristic of a tiny pleural effusion.  .      Electronically signed by: Daniel Sotelo MD  Date:    03/06/2019  Time:    12:15             Narrative:    EXAMINATION:  XR CHEST AP PORTABLE    CLINICAL HISTORY:  Sepsis;    COMPARISON:  12/17/2018    FINDINGS:  The size of the heart is prominent.  There has been interval development of a mild amount of interstitial opacities seen in both lungs with Kerley B-lines visualized bilaterally.  There is no pneumothorax.  The right costophrenic angle sharp.  The left costophrenic  angle is opacified.                                  Assessment/Plan:      * Acute hypoxemic respiratory failure    Given her presentation she has been given supplemental oxygen with sats up to 93%  I and concerned she could possible have another PE as she had in 2014 which appears to have been unprovoked and at the time recommended long term anticoagulation.    3/7/19:  Resolved as she is hypercapnic and after reviewing previous records there is a history of COPD.  It appears she has bilateral PNA will consolidations seen on CTA and no PE.  -transfer to ICU and place on Bipap with repeat ABG in about 1 hour  -consult Pulmonology        Acute congestive heart failure    She appears to be in acute failure but unclear if systolic/diastolic or combined dysfunction.  Will check 2D echo, she has been given lasix and from her history no known risk factors other than possible PNA vs PE  F/u echo and obtain stat CTA and given she is frail and has been given lasix will closely monitor her renal function as didn't want to obtain contrast but must given her history, empiric abx given she has an elevated WBC of 22K.    3/7/19: doubt if all CHF as she has diastolic dysfunction and my be mildly decompensated because of PNA. She was given IV lasix 20 mg in the ED yesterday afternoon and has but out about 600 cc of urine since last night. Her Troponin was slightly elevated but has remained stable on serial and no history of chest pain given so I suspect this was some mild demand ischemia from PNA. Echo eith normal LVF 60-65, Pulmonary HTN and diastolic dysfunction.     SIRS (systemic inflammatory response syndrome)    She presented with elevated WBC, HR, and hypoxia with no obvious source of infection only asymptomatic bacteruria which can be cause of her Leukocytosis but underlying PNA not completely excluded so will cover with Rocephin/Doxycycline    3/7/19: it appears she has underlying bilateral PNA  Continue current ans and  f/u blood cultures       Bacteriuria    She denies any symptoms but given her elevated WBC and UA cannot exclude as I feel her vitals and presentation related more to Respiratory.       Anxiety    Will resume her Xanax PRN         VTE Risk Mitigation (From admission, onward)        Ordered     enoxaparin injection 40 mg  Daily      03/06/19 1358     IP VTE HIGH RISK PATIENT  Once      03/06/19 1530     Place sequential compression device  Until discontinued      03/06/19 1530              Darling Mcgovern MD  Department of Hospital Medicine   [facility]

## 2019-03-07 NOTE — SUBJECTIVE & OBJECTIVE
Interval History: 89 yo female admitted with hypoxia respiratory failure now lethargic secondary to hypercapnia. She was thought to have CHF but CTA shows bilateral consolidation and she has a WBC 21K.    Review of Systems   Unable to perform ROS: Mental status change     Objective:     Vital Signs (Most Recent):  Temp: 98.2 °F (36.8 °C) (03/07/19 0734)  Pulse: 86 (03/07/19 0735)  Resp: (!) 24 (03/07/19 0735)  BP: (!) 184/84 (03/07/19 0734)  SpO2: 96 % (03/07/19 0735) Vital Signs (24h Range):  Temp:  [98.1 °F (36.7 °C)-99.9 °F (37.7 °C)] 98.2 °F (36.8 °C)  Pulse:  [80-93] 86  Resp:  [20-44] 24  SpO2:  [85 %-98 %] 96 %  BP: (147-184)/(66-90) 184/84     Weight: 79 kg (174 lb 4 oz)  Body mass index is 27.35 kg/m².    Intake/Output Summary (Last 24 hours) at 3/7/2019 0907  Last data filed at 3/7/2019 0606  Gross per 24 hour   Intake 790 ml   Output 1100 ml   Net -310 ml      Physical Exam   Constitutional: She appears well-developed. She appears lethargic.   Sick appearing female   HENT:   Head: Normocephalic and atraumatic.   Right Ear: External ear normal.   Left Ear: External ear normal.   Nose: Nose normal.   Mouth/Throat: Oropharynx is clear and moist.   Eyes: Pupils are equal, round, and reactive to light.   Neck: Normal range of motion. Neck supple.   Cardiovascular: Normal rate, regular rhythm, normal heart sounds and intact distal pulses.   Pulmonary/Chest: Tachypnea noted.   Coarse bilaterally   Abdominal: Soft. Bowel sounds are normal.   Genitourinary: Vagina normal.   Neurological: She appears lethargic.   Skin: Skin is warm and dry. Capillary refill takes less than 2 seconds.   Nursing note and vitals reviewed.      Significant Labs:   Recent Lab Results       03/07/19  0848   03/07/19  0442   03/06/19  2222   03/06/19  1756   03/06/19  1600        Influenza A, Molecular               Influenza B, Molecular               Procalcitonin               Albumin               Alkaline Phosphatase                Allens Test N/A             ALT               Anion Gap   10           Appearance, UA               aPTT               AST               Aortic Valve Regurgitation               Bacteria, UA               Baso #               Basophil%   0.0           Bilirubin (UA)               Total Bilirubin               BNP               Site LR             BUN, Bld   14           Calcium   9.5           QEF               Chloride   95           CO2   34           Color, UA               Creatinine   0.7           Diastolic Dysfunction               DelSys Venti Mask             Differential Method   Manual           eGFR if    >60           eGFR if non    >60  Comment:  Calculation used to obtain the estimated glomerular filtration  rate (eGFR) is the CKD-EPI equation.              Eos #               Eosinophil%   1.0           FiO2 50             Flow 15             Flu A & B Source               Glucose   93           Glucose, UA               Gran%   84.0           Hematocrit   40.4           Hemoglobin   12.8           Hyaline Casts, UA               Coumadin Monitoring INR               Ketones, UA               Lactate, Shahab         1.0  Comment:  Falsely low lactic acid results can be found in samples   containing >=13.0 mg/dL total bilirubin and/or >=3.5 mg/dL   direct bilirubin.       Leukocytes, UA               Lymph #               Lymph%   7.0           Magnesium               MCH   33.2           MCHC   31.7           MCV   105           Microscopic Comment               Mode SPONT             Mono #               Mono%   7.0           MPV   9.1           Mitral Valve Regurgitation               Myelocytes   1.0           Nitrite, UA               Occult Blood UA               Ovalocytes   Occasional           Est. PA Systolic Pressure               pH, UA               Phosphorus               Platelet Estimate   Appears normal           Platelets   397           POC BE 14              POC HCO3 40.3             POC PCO2 76.7             POC PH 7.328             POC PO2 97             POC SATURATED O2 96             Poik   Slight           Potassium   3.5           Total Protein               Protein, UA               Protime               RBC   3.86           RBC, UA               RDW   14.3           Sample ARTERIAL             Sodium   139           Specific Burns, UA               Specimen UA               Stomatocytes   Present           Tear Drop Cells   Occasional           Troponin I     0.046  Comment:  The reference interval for Troponin I represents the 99th percentile   cutoff   for our facility and is consistent with 3rd generation assay   performance.   0.040  Comment:  The reference interval for Troponin I represents the 99th percentile   cutoff   for our facility and is consistent with 3rd generation assay   performance.         Tricuspid Valve Regurgitation               Urobilinogen, UA               WBC, UA               WBC   21.56                            03/06/19  1443   03/06/19  1325   03/06/19  1211   03/06/19  1210        Influenza A, Molecular       Negative     Influenza B, Molecular       Negative     Procalcitonin       0.13  Comment:  A concentration < 0.25 ng/mL represents a low risk bacterial   infection.  Procalcitonin may not be accurate among patients with localized   infection, recent trauma or major surgery, immunosuppressed state,   invasive fungal infection, renal dysfunction. Decisions regarding   initiation or continuation of antibiotic therapy should not be based   solely on procalcitonin levels.       Albumin       2.8     Alkaline Phosphatase       159     Allens Test     Pass       ALT       20     Anion Gap       8     Appearance, UA   Clear         aPTT       32.8  Comment:  aPTT therapeutic range = 39-69 seconds     AST       22     Aortic Valve Regurgitation MILD           Bacteria, UA   Moderate         Baso #        CANCELED  Comment:  Result canceled by the ancillary.     Basophil%       0.0     Bilirubin (UA)   Negative         Total Bilirubin       0.4  Comment:  For infants and newborns, interpretation of results should be based  on gestational age, weight and in agreement with clinical  observations.  Premature Infant recommended reference ranges:  Up to 24 hours.............<8.0 mg/dL  Up to 48 hours............<12.0 mg/dL  3-5 days..................<15.0 mg/dL  6-29 days.................<15.0 mg/dL       BNP       326  Comment:  Values of less than 100 pg/ml are consistent with non-CHF populations.     Site     LR       BUN, Bld       17     Calcium       9.8     QEF 60           Chloride       98     CO2       33     Color, UA   Yellow         Creatinine       0.8     Diastolic Dysfunction Yes           DelSys             Differential Method       Manual     eGFR if        >60     eGFR if non        >60  Comment:  Calculation used to obtain the estimated glomerular filtration  rate (eGFR) is the CKD-EPI equation.        Eos #       CANCELED  Comment:  Result canceled by the ancillary.     Eosinophil%       1.0     FiO2             Flow             Flu A & B Source       Nasal swab     Glucose       110     Glucose, UA   Negative         Gran%       82.0     Hematocrit       42.9     Hemoglobin       13.9     Hyaline Casts, UA   0         Coumadin Monitoring INR       1.0  Comment:  Coumadin Therapy:  2.0 - 3.0 for INR for all indicators except mechanical heart valves  and antiphospholipid syndromes which should use 2.5 - 3.5.       Ketones, UA   Negative         Lactate, Shahab       0.8  Comment:  Falsely low lactic acid results can be found in samples   containing >=13.0 mg/dL total bilirubin and/or >=3.5 mg/dL   direct bilirubin.       Leukocytes, UA   Negative         Lymph #       CANCELED  Comment:  Result canceled by the ancillary.     Lymph%       10.0     Magnesium       1.9     MCH        33.7     MCHC       32.4     MCV       104     Microscopic Comment   SEE COMMENT  Comment:  Other formed elements not mentioned in the report are not   present in the microscopic examination.            Mode             Mono #       CANCELED  Comment:  Result canceled by the ancillary.     Mono%       3.0     MPV       9.2     Mitral Valve Regurgitation TRIVIAL           Myelocytes       4.0     Nitrite, UA   Positive         Occult Blood UA   Negative         Ovalocytes       Occasional     Est. PA Systolic Pressure 40.7           pH, UA   6.0         Phosphorus       2.9     Platelet Estimate             Platelets       400     POC BE     9       POC HCO3     33.9       POC PCO2     58.5       POC PH     7.370       POC PO2     52       POC SATURATED O2     85       Poik       Slight     Potassium       3.6     Total Protein       7.1     Protein, UA   2+  Comment:  Recommend a 24 hour urine protein or a urine   protein/creatinine ratio if globulin induced proteinuria is  clinically suspected.           Protime       10.9     RBC       4.13     RBC, UA   2         RDW       14.3     Sample     ARTERIAL       Sodium       139     Specific Gravity, UA   >=1.030         Specimen UA   Urine, Catheterized         Stomatocytes       Present     Tear Drop Cells       Occasional     Troponin I       0.047  Comment:  The reference interval for Troponin I represents the 99th percentile   cutoff   for our facility and is consistent with 3rd generation assay   performance.       Tricuspid Valve Regurgitation TRIVIAL           Urobilinogen, UA   Negative         WBC, UA   1         WBC       22.73           Significant Imaging:   Imaging Results          X-Ray Chest AP Portable (Final result)  Result time 03/06/19 12:15:59    Final result by OLEGARIO Sotelo Sr., MD (03/06/19 12:15:59)                 Impression:      1. The size of the heart is prominent. There has been interval development of a mild amount of  interstitial opacities seen in both lungs with Kerley B-lines visualized bilaterally.  This is characteristic of pulmonary edema.  2. The left costophrenic angle is opacified.  This is characteristic of a tiny pleural effusion.  .      Electronically signed by: Daniel Sotelo MD  Date:    03/06/2019  Time:    12:15             Narrative:    EXAMINATION:  XR CHEST AP PORTABLE    CLINICAL HISTORY:  Sepsis;    COMPARISON:  12/17/2018    FINDINGS:  The size of the heart is prominent.  There has been interval development of a mild amount of interstitial opacities seen in both lungs with Kerley B-lines visualized bilaterally.  There is no pneumothorax.  The right costophrenic angle sharp.  The left costophrenic angle is opacified.

## 2019-03-07 NOTE — PLAN OF CARE
Per EMR,  presented to the ED with her son complaining of SOB associated with a cough. Per the son this has been ongoing for about 1 week. Upon presentation she was found to be in some respiratory distress and hypoxia with oxygen saturation at 86%. She also was found down by family members but denies any LOC.    Chest Xray shows prominent size heart with interstitial opacities of both lungs with Carlee B-lines. Per the patient and her son there is no known history of CHF but he does give a history of PE which from history doesn't sound like it was provoked.   Patient transferred from floor to ICU. Patient has BiPaP On and hard to arouse. No family at the bedside presently. No needs identified at this time.    Maggy Ngo MD     [facility] [address], [place] [facility] [address] [address] [facility]  [address] [address] [address] [address]  Phone: [phone] Fax: [phone]    [facility] [place], [place] - [address] [facility]  [address]  [facility] [address]  Phone: [phone] Fax: [phone]       03/07/19 1554   Discharge Assessment   Assessment Type Discharge Planning Assessment   Confirmed/corrected address and phone number on facesheet? No   Assessment information obtained from? Medical Record   Communicated expected length of stay with patient/caregiver no   Prior to hospitilization cognitive status: Unable to Assess   Current cognitive status: Unable to Assess   Lives With other (see comments)   Is patient able to care for self after discharge? Unable to determine at this time (comments)   Patient's perception of discharge disposition other (comments)   Readmission Within the Last 30 Days other (see comments)   Patient currently being followed by outpatient case management? Unable to determine (comments)   Equipment Currently Used at Home shower chair;walker, rolling   Discharge Plan A Other   Discharge Plan B Other   DME Needed Upon  Discharge  other (see comments)   Patient/Family in Agreement with Plan unable to assess

## 2019-03-07 NOTE — ASSESSMENT & PLAN NOTE
She presented with elevated WBC, HR, and hypoxia with no obvious source of infection only asymptomatic bacteruria which can be cause of her Leukocytosis but underlying PNA not completely excluded so will cover with Rocephin/Doxycycline    3/7/19: it appears she has underlying bilateral PNA  Continue current ans and f/u blood cultures

## 2019-03-07 NOTE — NURSING
Pt transferred to ICU bed with max assist. Pt placed on Bipap. Pt only moans and groans with stimulation. VSS at this time. See flowsheet for full assessment. Will continue to monitor.

## 2019-03-07 NOTE — NURSING
Telesitter avasys contacted about pt pulling out oxygen, pure wic, and attempting to get out of bed without avasys alerting staff. Orders were updated to watch for patient pulling at lines as well as fall precautions. Will continue to monitor.

## 2019-03-07 NOTE — ASSESSMENT & PLAN NOTE
Given her presentation she has been given supplemental oxygen with sats up to 93%  I and concerned she could possible have another PE as she had in 2014 which appears to have been unprovoked and at the time recommended long term anticoagulation.    3/7/19:  Resolved as she is hypercapnic and after reviewing previous records there is a history of COPD.  It appears she has bilateral PNA will consolidations seen on CTA and no PE.  -transfer to ICU and place on Bipap with repeat ABG in about 1 hour  -consult Pulmonology

## 2019-03-07 NOTE — PLAN OF CARE
Hard to arouse and patient on BiPAP . No family presently at the beddside     03/07/19 8954   Medicare Message   Important Message from Medicare regarding Discharge Appeal Rights Other (comments)   Date IMM was signed 03/07/19   Time IMM was signed [id number]

## 2019-03-07 NOTE — PLAN OF CARE
Problem: Adult Inpatient Plan of Care  Goal: Plan of Care Review  Outcome: Ongoing (interventions implemented as appropriate)  POC reviewed, verbalized understanding. Pt remained free from falls, fall precautions in place. Pt is NSR on monitor, 80s. VSS. No other c/o at this time. PIV intact, saline locked. Avasys at bedside. Call bell and personal belongings within reach. Hourly rounding complete. Reminded to call for assistance. Will continue to monitor.

## 2019-03-07 NOTE — HOSPITAL COURSE
3/7/19: elderly female admitted yesterday with hypoxia and was thought to have decompensated diastolic dysfunction found this am very lethargic and ABG showed hypercapnia with Pco2 of 76. CTA ruled out PE but showed bilateral consolidation lungs    3/8/19: patient transferred to ICU yesterday and placed on Bipap but much more alert today. She was made DNR by her son. CXR is improved with some mild haziness of both lungs and small pleural effusion of left. Her WBC is trending down to 16K.    3/9/19: patient is doing much better and off BiPAP on nasal cannula and has been downgraded to tele but no beds. Her WBC back up today and abx changed to give better anaerobic coverage as she is aspirating and appreciate ST.    3/10/19: still with coughing her WBC now normal and clinically she looks better.    3/11/19 needs nocturnal bipap. Need to discuss with family about discharge planning.     3/12  Patient Improved steadily, seen and examined today , deemed stable for a safe discharge to home. Patient diuresed following echo where it was determined patient has Grade 2 Diastolic Dysfunction. Patient placed on abx for home completion. Patient evaluated for aspiration by speech who made the following recommendations:               General Recommendations:  Dysphagia therapy  Diet recommendations:  Puree, Liquid Diet Level: Nectar Thick   Aspiration Precautions: 1 bite/sip at a time, Alternating bites/sips, HOB to 90 degrees and Small bites/sips   General Precautions: Standard, aspiration

## 2019-03-08 PROBLEM — I50.33 ACUTE ON CHRONIC DIASTOLIC CONGESTIVE HEART FAILURE: Status: ACTIVE | Noted: 2019-03-06

## 2019-03-08 PROBLEM — J69.0 ASPIRATION PNEUMONIA: Status: ACTIVE | Noted: 2019-03-06

## 2019-03-08 PROBLEM — F03.90 DEMENTIA: Chronic | Status: ACTIVE | Noted: 2019-01-10

## 2019-03-08 LAB
ALBUMIN SERPL BCP-MCNC: 2.3 G/DL
ALLENS TEST: ABNORMAL
ALP SERPL-CCNC: 118 U/L
ALT SERPL W/O P-5'-P-CCNC: 18 U/L
ANION GAP SERPL CALC-SCNC: 8 MMOL/L
AST SERPL-CCNC: 22 U/L
BASOPHILS # BLD AUTO: ABNORMAL K/UL
BASOPHILS NFR BLD: 0 %
BILIRUB SERPL-MCNC: 0.3 MG/DL
BUN SERPL-MCNC: 16 MG/DL
CALCIUM SERPL-MCNC: 9.3 MG/DL
CHLORIDE SERPL-SCNC: 98 MMOL/L
CO2 SERPL-SCNC: 35 MMOL/L
CREAT SERPL-MCNC: 0.7 MG/DL
DELSYS: ABNORMAL
DIFFERENTIAL METHOD: ABNORMAL
EOSINOPHIL # BLD AUTO: ABNORMAL K/UL
EOSINOPHIL NFR BLD: 1 %
EP: 7
ERYTHROCYTE [DISTWIDTH] IN BLOOD BY AUTOMATED COUNT: 14.8 %
ERYTHROCYTE [SEDIMENTATION RATE] IN BLOOD BY WESTERGREN METHOD: 24 MM/H
EST. GFR  (AFRICAN AMERICAN): >60 ML/MIN/1.73 M^2
EST. GFR  (NON AFRICAN AMERICAN): >60 ML/MIN/1.73 M^2
FIO2: 35
GLUCOSE SERPL-MCNC: 80 MG/DL
HCO3 UR-SCNC: 39.8 MMOL/L (ref 24–28)
HCT VFR BLD AUTO: 41.8 %
HGB BLD-MCNC: 12.9 G/DL
IP: 14
LYMPHOCYTES # BLD AUTO: ABNORMAL K/UL
LYMPHOCYTES NFR BLD: 10 %
MAGNESIUM SERPL-MCNC: 1.8 MG/DL
MCH RBC QN AUTO: 33.2 PG
MCHC RBC AUTO-ENTMCNC: 30.9 G/DL
MCV RBC AUTO: 108 FL
METAMYELOCYTES NFR BLD MANUAL: 2 %
MODE: ABNORMAL
MONOCYTES # BLD AUTO: ABNORMAL K/UL
MONOCYTES NFR BLD: 8 %
MYELOCYTES NFR BLD MANUAL: 2 %
NEUTROPHILS NFR BLD: 76 %
NEUTS BAND NFR BLD MANUAL: 1 %
PCO2 BLDA: 72.2 MMHG (ref 35–45)
PH SMN: 7.35 [PH] (ref 7.35–7.45)
PLATELET # BLD AUTO: 285 K/UL
PMV BLD AUTO: 9.9 FL
PO2 BLDA: 72 MMHG (ref 80–100)
POC BE: 14 MMOL/L
POC SATURATED O2: 93 % (ref 95–100)
POIKILOCYTOSIS BLD QL SMEAR: SLIGHT
POTASSIUM SERPL-SCNC: 3.9 MMOL/L
PROT SERPL-MCNC: 6.2 G/DL
RBC # BLD AUTO: 3.88 M/UL
SAMPLE: ABNORMAL
SITE: ABNORMAL
SODIUM SERPL-SCNC: 141 MMOL/L
STOMATOCYTES BLD QL SMEAR: PRESENT
WBC # BLD AUTO: 16.69 K/UL

## 2019-03-08 PROCEDURE — 99291 PR CRITICAL CARE, E/M 30-74 MINUTES: ICD-10-PCS | Mod: ,,, | Performed by: NURSE PRACTITIONER

## 2019-03-08 PROCEDURE — 85027 COMPLETE CBC AUTOMATED: CPT

## 2019-03-08 PROCEDURE — S0028 INJECTION, FAMOTIDINE, 20 MG: HCPCS | Performed by: NURSE PRACTITIONER

## 2019-03-08 PROCEDURE — 99900035 HC TECH TIME PER 15 MIN (STAT)

## 2019-03-08 PROCEDURE — 85007 BL SMEAR W/DIFF WBC COUNT: CPT

## 2019-03-08 PROCEDURE — 99291 CRITICAL CARE FIRST HOUR: CPT | Mod: ,,, | Performed by: NURSE PRACTITIONER

## 2019-03-08 PROCEDURE — 80053 COMPREHEN METABOLIC PANEL: CPT

## 2019-03-08 PROCEDURE — 25000003 PHARM REV CODE 250: Performed by: NURSE PRACTITIONER

## 2019-03-08 PROCEDURE — 25000003 PHARM REV CODE 250: Performed by: FAMILY MEDICINE

## 2019-03-08 PROCEDURE — 20000000 HC ICU ROOM

## 2019-03-08 PROCEDURE — 82803 BLOOD GASES ANY COMBINATION: CPT

## 2019-03-08 PROCEDURE — 94640 AIRWAY INHALATION TREATMENT: CPT

## 2019-03-08 PROCEDURE — 83735 ASSAY OF MAGNESIUM: CPT

## 2019-03-08 PROCEDURE — 92610 EVALUATE SWALLOWING FUNCTION: CPT

## 2019-03-08 PROCEDURE — 36600 WITHDRAWAL OF ARTERIAL BLOOD: CPT

## 2019-03-08 PROCEDURE — 36415 COLL VENOUS BLD VENIPUNCTURE: CPT

## 2019-03-08 PROCEDURE — G8997 SWALLOW GOAL STATUS: HCPCS | Mod: CJ

## 2019-03-08 PROCEDURE — 63600175 PHARM REV CODE 636 W HCPCS: Performed by: FAMILY MEDICINE

## 2019-03-08 PROCEDURE — 27000221 HC OXYGEN, UP TO 24 HOURS

## 2019-03-08 PROCEDURE — G8996 SWALLOW CURRENT STATUS: HCPCS | Mod: CK

## 2019-03-08 PROCEDURE — 25000242 PHARM REV CODE 250 ALT 637 W/ HCPCS: Performed by: FAMILY MEDICINE

## 2019-03-08 PROCEDURE — 63600175 PHARM REV CODE 636 W HCPCS: Performed by: NURSE PRACTITIONER

## 2019-03-08 PROCEDURE — A4216 STERILE WATER/SALINE, 10 ML: HCPCS | Performed by: FAMILY MEDICINE

## 2019-03-08 RX ORDER — BISACODYL 10 MG
10 SUPPOSITORY, RECTAL RECTAL DAILY PRN
Status: DISCONTINUED | OUTPATIENT
Start: 2019-03-08 | End: 2019-03-12 | Stop reason: HOSPADM

## 2019-03-08 RX ORDER — ASPIRIN 81 MG/1
81 TABLET ORAL DAILY
Status: DISCONTINUED | OUTPATIENT
Start: 2019-03-09 | End: 2019-03-12 | Stop reason: HOSPADM

## 2019-03-08 RX ORDER — FAMOTIDINE 20 MG/1
20 TABLET, FILM COATED ORAL DAILY
Status: DISCONTINUED | OUTPATIENT
Start: 2019-03-09 | End: 2019-03-12 | Stop reason: HOSPADM

## 2019-03-08 RX ADMIN — IPRATROPIUM BROMIDE AND ALBUTEROL SULFATE 3 ML: .5; 3 SOLUTION RESPIRATORY (INHALATION) at 02:03

## 2019-03-08 RX ADMIN — CEFEPIME HYDROCHLORIDE 1 G: 1 INJECTION, SOLUTION INTRAVENOUS at 03:03

## 2019-03-08 RX ADMIN — DOXYCYCLINE 100 MG: 100 INJECTION, POWDER, LYOPHILIZED, FOR SOLUTION INTRAVENOUS at 05:03

## 2019-03-08 RX ADMIN — PIPERACILLIN AND TAZOBACTAM 4.5 G: 4; .5 INJECTION, POWDER, LYOPHILIZED, FOR SOLUTION INTRAVENOUS; PARENTERAL at 10:03

## 2019-03-08 RX ADMIN — ENOXAPARIN SODIUM 40 MG: 100 INJECTION SUBCUTANEOUS at 06:03

## 2019-03-08 RX ADMIN — Medication 100 MG: at 09:03

## 2019-03-08 RX ADMIN — Medication 3 ML: at 09:03

## 2019-03-08 RX ADMIN — ESCITALOPRAM OXALATE 20 MG: 10 TABLET, FILM COATED ORAL at 02:03

## 2019-03-08 RX ADMIN — POLYETHYLENE GLYCOL 3350 17 G: 17 POWDER, FOR SOLUTION ORAL at 09:03

## 2019-03-08 RX ADMIN — Medication 3 ML: at 02:03

## 2019-03-08 RX ADMIN — FAMOTIDINE 20 MG: 10 INJECTION, SOLUTION INTRAVENOUS at 09:03

## 2019-03-08 RX ADMIN — IPRATROPIUM BROMIDE AND ALBUTEROL SULFATE 3 ML: .5; 3 SOLUTION RESPIRATORY (INHALATION) at 08:03

## 2019-03-08 RX ADMIN — PIPERACILLIN AND TAZOBACTAM 4.5 G: 4; .5 INJECTION, POWDER, LYOPHILIZED, FOR SOLUTION INTRAVENOUS; PARENTERAL at 06:03

## 2019-03-08 RX ADMIN — IPRATROPIUM BROMIDE AND ALBUTEROL SULFATE 3 ML: .5; 3 SOLUTION RESPIRATORY (INHALATION) at 07:03

## 2019-03-08 NOTE — ASSESSMENT & PLAN NOTE
Given her presentation she has been given supplemental oxygen with sats up to 93%  I and concerned she could possible have another PE as she had in 2014 which appears to have been unprovoked and at the time recommended long term anticoagulation.    3/7/19:  Resolved as she is hypercapnic and after reviewing previous records there is a history of COPD.  It appears she has bilateral PNA will consolidations seen on CTA and no PE.  -transfer to ICU and place on Bipap with repeat ABG in about 1 hour  -consult Pulmonology     3/8/19: still on BiPAP but will attempt to wean as she is now DNR and may not tolerate being off as discussion will be made with her son today. This is secondary to underlying PNA with possible underlying aspiration

## 2019-03-08 NOTE — SUBJECTIVE & OBJECTIVE
Review of Systems   Constitutional: Negative for chills, fever and malaise/fatigue.   HENT: Negative for congestion.    Eyes: Negative for blurred vision.   Respiratory: Positive for cough and sputum production. Negative for shortness of breath.    Cardiovascular: Negative for chest pain and leg swelling.   Gastrointestinal: Negative for abdominal pain, nausea and vomiting.   Genitourinary: Negative for dysuria.   Musculoskeletal: Negative for myalgias.   Skin: Negative for rash.   Neurological: Positive for weakness. Negative for dizziness, focal weakness and headaches.   Endo/Heme/Allergies: Does not bruise/bleed easily.   Psychiatric/Behavioral: The patient is not nervous/anxious.        Objective:     Vital Signs (Most Recent):  Temp: 98.3 °F (36.8 °C) (03/08/19 1105)  Pulse: 74 (03/08/19 1412)  Resp: 15 (03/08/19 1412)  BP: (!) 133/48 (03/08/19 1200)  SpO2: 95 % (03/08/19 1412) Vital Signs (24h Range):  Temp:  [97.8 °F (36.6 °C)-98.4 °F (36.9 °C)] 98.3 °F (36.8 °C)  Pulse:  [45-81] 74  Resp:  [15-34] 15  SpO2:  [92 %-99 %] 95 %  BP: ()/(24-99) 133/48     Weight: 79.2 kg (174 lb 9.7 oz)  Body mass index is 27.4 kg/m².      Intake/Output Summary (Last 24 hours) at 3/8/2019 1417  Last data filed at 3/8/2019 1033  Gross per 24 hour   Intake 600 ml   Output 625 ml   Net -25 ml       Physical Exam   Constitutional: She appears well-developed and well-nourished. She is cooperative.  Non-toxic appearance. She does not have a sickly appearance. She appears ill. No distress. She is not intubated. Nasal cannula in place.   HENT:   Head: Normocephalic and atraumatic.   Mouth/Throat: Mucous membranes are dry (post wearing BiPAP).   Eyes: EOM and lids are normal. Pupils are equal, round, and reactive to light.   Neck: Trachea normal and full passive range of motion without pain. Carotid bruit is not present.   Cardiovascular: Normal rate and regular rhythm. Exam reveals distant heart sounds.   Pulses:       Radial  pulses are 2+ on the right side, and 2+ on the left side.        Dorsalis pedis pulses are 1+ on the right side, and 1+ on the left side.   Pulmonary/Chest: Effort normal. No accessory muscle usage. No tachypnea. She is not intubated. No respiratory distress. She has decreased breath sounds in the right lower field and the left lower field. She has rales.   Abdominal: Soft. Normal appearance. She exhibits no distension. Bowel sounds are decreased. There is no tenderness.   Musculoskeletal: Normal range of motion.        Right foot: There is no deformity.        Left foot: There is no deformity.   Trace LE edema   Lymphadenopathy:     She has no cervical adenopathy.   Neurological: She is alert.   Fully awake and alert and responsive with mild to mod confusion and baseline dementia   Skin: Skin is warm, dry and intact. Capillary refill takes less than 2 seconds. No rash noted. No cyanosis.   Psychiatric: She has a normal mood and affect. Her speech is normal and behavior is normal. She exhibits abnormal recent memory.       Vents:  Oxygen Concentration (%): 35 (03/08/19 0300)    Lines/Drains/Airways     Drain            Female External Urinary Catheter 03/07/19 1900 less than 1 day          Peripheral Intravenous Line                 Peripheral IV - Single Lumen 03/06/19 1200 Left Antecubital 2 days         Peripheral IV - Single Lumen 03/06/19 1210 Right Hand 2 days                Significant Labs:    CBC/Anemia Profile:  Recent Labs   Lab 03/07/19  0442 03/08/19  0358   WBC 21.56* 16.69*   HGB 12.8 12.9   HCT 40.4 41.8   * 285   * 108*   RDW 14.3 14.8*        Chemistries:  Recent Labs   Lab 03/07/19  0442 03/08/19  0358    141   K 3.5 3.9   CL 95 98   CO2 34* 35*   BUN 14 16   CREATININE 0.7 0.7   CALCIUM 9.5 9.3   ALBUMIN  --  2.3*   PROT  --  6.2   BILITOT  --  0.3   ALKPHOS  --  118   ALT  --  18   AST  --  22   MG  --  1.8       ABGs:   Recent Labs   Lab 03/08/19  0417   PH 7.349*   PCO2  72.2*   HCO3 39.8*   POCSATURATED 93*   BE 14     All pertinent labs within the past 24 hours have been reviewed.    Significant Imaging:  CXR: I have reviewed all pertinent results/findings within the past 24 hours and my personal findings are:  mild bibasal infiltrates

## 2019-03-08 NOTE — HPI
89 year old female who  has a past medical history of Acute congestive heart failure (3/6/2019), Age-related macular degeneration, wet, both eyes, Anginal equivalent, Anxiety, Arthritis, BPPV (benign paroxysmal positional vertigo), Brain tumor, Clotting disorder, COPD (chronic obstructive pulmonary disease), Depression, Diastolic dysfunction, Hearing loss, Heart disease, Hyperlipidemia, Hypertension, Hypoxemia, Lung nodule, Meningioma, Multiple thyroid nodules, Pulmonary embolism, Pulmonary hypertension, Sleep apnea in adult, Stroke, Urinary incontinence, and Vitamin D deficiency admitted with acute respiratory failure with hypoxemia and hypercapnea. Transferred to the MICU on BIPAP. Patient is obtunded unable to provide history. Information obtained from chart review and discussion with health care personnel.

## 2019-03-08 NOTE — CONSULTS
[facility] -   Critical Care Medicine  Consult Note    Patient Name: Ashley Koenig  MRN: [id number]  Admission Date: 3/6/2019  Hospital Length of Stay: 1 days  Code Status: DNR  Attending Physician: Darling Mcgovern MD   Primary Care Provider: Maggy Ngo MD   Principal Problem: Acute hypoxemic respiratory failure      Subjective:     HPI:   89 year old female who  has a past medical history of Acute congestive heart failure (3/6/2019), Age-related macular degeneration, wet, both eyes, Anginal equivalent, Anxiety, Arthritis, BPPV (benign paroxysmal positional vertigo), Brain tumor, Clotting disorder, COPD (chronic obstructive pulmonary disease), Depression, Diastolic dysfunction, Hearing loss, Heart disease, Hyperlipidemia, Hypertension, Hypoxemia, Lung nodule, Meningioma, Multiple thyroid nodules, Pulmonary embolism, Pulmonary hypertension, Sleep apnea in adult, Stroke, Urinary incontinence, and Vitamin D deficiency admitted with acute respiratory failure with hypoxemia and hypercapnea. Transferred to the MICU on BIPAP. Patient is obtunded unable to provide history. Information obtained from chart review and discussion with health care personnel.     Hospital/ICU Course:  3/7: Seen and examined at bedside. Hospital chart reviewed. On BIPAP. Encephalopathic. Unable to provide history. CTA ruled out PE but showed bilateral consolidation lungs        Past Medical History:   Diagnosis Date    Acute congestive heart failure 3/6/2019    Age-related macular degeneration, wet, both eyes     Anginal equivalent     Anxiety     Arthritis     BPPV (benign paroxysmal positional vertigo)     Brain tumor     Clotting disorder     lung    COPD (chronic obstructive pulmonary disease)     Depression     Diastolic dysfunction     Hearing loss     Heart disease     Hyperlipidemia     Hypertension     Hypoxemia     Lung nodule     Meningioma     Multiple thyroid nodules     Pulmonary embolism      Pulmonary hypertension     Sleep apnea in adult     Stroke     per eye exam, never been treated for    Urinary incontinence     Vitamin D deficiency        Past Surgical History:   Procedure Laterality Date    CATARACT EXTRACTION Bilateral     TOTAL HIP ARTHROPLASTY Right 08/20/2016    francoise witt       Review of patient's allergies indicates:  No Known Allergies    Scheduled Meds:   albuterol-ipratropium  3 mL Nebulization Q6H WAKE    ceFEPime (MAXIPIME) IVPB  1 g Intravenous Q8H    doxycycline (VIBRAMYCIN) IVPB  100 mg Intravenous Q12H    enoxaparin  40 mg Subcutaneous Daily    escitalopram oxalate  20 mg Oral Daily    famotidine (PF)  20 mg Intravenous Daily    niacin  100 mg Oral QHS    polyethylene glycol  17 g Oral Daily    sodium chloride 0.9%  3 mL Intravenous Q8H     Continuous Infusions:  PRN Meds:.acetaminophen, loperamide, ondansetron, sodium chloride 0.9%       Family History     Problem Relation (Age of Onset)    Alzheimer's disease Father, Daughter    No Known Problems Son        Tobacco Use    Smoking status: Never Smoker    Smokeless tobacco: Never Used   Substance and Sexual Activity    Alcohol use: No    Drug use: No    Sexual activity: Not Currently         Review of Systems   Unable to perform ROS: Mental status change     Objective:     Vital Signs (Most Recent):  Temp: 98.3 °F (36.8 °C) (03/07/19 1505)  Pulse: 70 (03/07/19 1715)  Resp: (!) 30 (03/07/19 1715)  BP: (!) 122/43 (03/07/19 1715)  SpO2: 96 % (03/07/19 1715) Vital Signs (24h Range):  Temp:  [98.2 °F (36.8 °C)-99.9 °F (37.7 °C)] 98.3 °F (36.8 °C)  Pulse:  [55-94] 70  Resp:  [20-34] 30  SpO2:  [85 %-99 %] 96 %  BP: (107-195)/(36-93) 122/43     Weight: 79 kg (174 lb 4 oz)  Body mass index is 27.35 kg/m².      Intake/Output Summary (Last 24 hours) at 3/7/2019 1924  Last data filed at 3/7/2019 1740  Gross per 24 hour   Intake 670 ml   Output 675 ml   Net -5 ml       Physical Exam   Constitutional: She appears  well-developed. She appears lethargic.   Sick appearing female   HENT:   Head: Normocephalic and atraumatic.   Right Ear: External ear normal.   Left Ear: External ear normal.   Nose: Nose normal.   Mouth/Throat: Oropharynx is clear and moist.   Eyes: Pupils are equal, round, and reactive to light.   Neck: Normal range of motion. Neck supple.   Cardiovascular: Normal rate, regular rhythm, normal heart sounds and intact distal pulses.   Pulmonary/Chest: Tachypnea noted.   Coarse bilaterally   Abdominal: Soft. Bowel sounds are normal.   Genitourinary: Vagina normal.   Neurological: She appears lethargic.   Skin: Skin is warm and dry. Capillary refill takes less than 2 seconds.   Nursing note and vitals reviewed.      Vents:  Oxygen Concentration (%): 35 (03/07/19 1711)    Lines/Drains/Airways     Peripheral Intravenous Line                 Peripheral IV - Single Lumen 03/06/19 1200 Left Antecubital 1 day         Peripheral IV - Single Lumen 03/06/19 1210 Right Hand 1 day                Significant Labs:    CBC/Anemia Profile:  Recent Labs   Lab 03/06/19 1210 03/07/19  0442   WBC 22.73* 21.56*   HGB 13.9 12.8   HCT 42.9 40.4   * 397*   * 105*   RDW 14.3 14.3        Chemistries:  Recent Labs   Lab 03/06/19 1210 03/07/19  0442    139   K 3.6 3.5   CL 98 95   CO2 33* 34*   BUN 17 14   CREATININE 0.8 0.7   CALCIUM 9.8 9.5   ALBUMIN 2.8*  --    PROT 7.1  --    BILITOT 0.4  --    ALKPHOS 159*  --    ALT 20  --    AST 22  --    MG 1.9  --    PHOS 2.9  --        ABGs:   Recent Labs   Lab 03/07/19  1332   PH 7.323*   PCO2 76.5*   HCO3 39.7*   POCSATURATED 98   BE 14     Coagulation:   Recent Labs   Lab 03/06/19  1210   INR 1.0   APTT 32.8*     Lactic Acid:   Recent Labs   Lab 03/06/19  1210 03/06/19  1600   LACTATE 0.8 1.0     Troponin:   Recent Labs   Lab 03/06/19  1210 03/06/19  1756 03/06/19  2222   TROPONINI 0.047* 0.040* 0.046*     Urine Studies:   Recent Labs   Lab 03/06/19  1325   COLORU Yellow    APPEARANCEUA Clear   PHUR 6.0   SPECGRAV >=1.030*   PROTEINUA 2+*   GLUCUA Negative   KETONESU Negative   BILIRUBINUA Negative   OCCULTUA Negative   NITRITE Positive*   UROBILINOGEN Negative   LEUKOCYTESUR Negative   RBCUA 2   WBCUA 1   BACTERIA Moderate*   HYALINECASTS 0       Significant Imaging:     CTA Chest Non Coronary 03/06/19    No CT evidence of pulmonary embolism.  Motion artifact obscures detail.  Patchy areas of consolidation in the lung bases right greater than left which may represent pneumonia with additional area of consolidation in the medial right middle lobe.  Follow-up recommended.        ABG  Recent Labs   Lab 03/07/19  1332   PH 7.323*   PO2 131*   PCO2 76.5*   HCO3 39.7*   BE 14     Assessment/Plan:       I have reviewed all labs and imaging studies and compared to previous results. I have also discussed labs with all the teams in the medical care of the patient and my plan is outlined below     Neuro    Neurochecks per routine.     Dementia    Supportive care     Psychiatric   Anxiety    Stop XANAX. Start LEXAPRO.     Pulmonary   * Acute hypoxemic respiratory failure    BIPAP 14 / 8 40% FIO2. Serial ABG to assess gas exchange. Keep SAO2 >= 92%.  . Bronchodilators per orders     Cardiac/Vascular   Acute congestive heart failure    Optimize CHF regimen.       Renal/    Monitor BUN / Cr. Montor urine output.     Acute cystitis without hematuria    Continue broad spectrum antibiotics.      ID   Sepsis due to pneumonia      Vitals:    03/07/19 1530 03/07/19 1600 03/07/19 1630 03/07/19 1715   BP: (!) 145/48 (!) 115/41 125/64 (!) 122/43   BP Location:       Patient Position:       Pulse: 73 73 73 70   Resp: (!) 25 (!) 31 (!) 34 (!) 30   Temp:       TempSrc:       SpO2: 96% 97% 96% 96%   Weight:       Height:                 [x] Respiratory rate >20 breaths/min or PaCO2 <32 mmHg   [x]  WBC >12,000 cells/mm3, <4000 cells/mm3, or >10 percent immature (band) forms  [] Heart rate >90 beats/min    [] Temperature >38ºC or <36ºC    Microbiology: Panculture.   Oxygenation: NIPPV - BIPAP 14 / 8  Keep SAO2 > = 92%  Hemodynamics:Keep MAP > = 65mmHg  Source control: IV MAXIPIME, VANCOMYCIN ( ASPIRATION PNEUMONIA)      Microbiology Results (last 7 days)     Procedure Component Value Units Date/Time    Blood culture x two cultures. Draw prior to antibiotics. [[id number]] Collected:  03/06/19 1200    Order Status:  Completed Specimen:  Blood from Peripheral, Antecubital, Left Updated:  03/07/19 0915     Blood Culture, Routine No Growth to date    Narrative:       Aerobic and anaerobic    Blood culture x two cultures. Draw prior to antibiotics. [[id number]] Collected:  03/06/19 1210    Order Status:  Completed Specimen:  Blood from Peripheral, Hand, Right Updated:  03/07/19 0915     Blood Culture, Routine No Growth to date    Narrative:       Aerobic and anaerobic    Influenza A & B by Molecular [[id number]] Collected:  03/06/19 1210    Order Status:  Completed Specimen:  Nasopharyngeal Swab Updated:  03/06/19 1249     Influenza A, Molecular Negative     Influenza B, Molecular Negative     Flu A & B Source Nasal swab             Hematology    Monitor hemogram. Transfuse as needed.     GI    NPO. Stress ulcer prophylaxis.         Critical Care Daily Checklist:    A: Awake: RASS Goal/Actual Goal:  0  Actual:  0   B: Spontaneous Breathing Trial Performed?  n/a   C: SAT & SBT Coordinated?  N/A                      D: Delirium: CAM-ICU  POSITIVE   E: Early Mobility Performed? N/A   F: Feeding Goal:    Status:     Current Diet Order   Procedures    Diet Low Sodium, 2gm      AS: Analgesia/Sedation ACETAMINOPHEN   T: Thromboembolic Prophylaxis ENOXAPARIN   H: HOB > 300 Yes   U: Stress Ulcer Prophylaxis (if needed) FAMOTIDINE   G: Glucose Control SBGM   B: Bowel Function     I: Indwelling Catheter (Lines & Bailey) Necessity YES   D: De-escalation of Antimicrobials/Pharmacotherapies N/A    Plan for the day/ETD Continue current     Code Status:  Family/Goals of Care: DNR  SNF     Critical Care Time: 60  minutes  Critical secondary to  Patient has a condition that poses threat to life and bodily function: ACUTE RESPIRATORY FAILURE, SEPSIS SECONDARY TO PNEUMONIA, UTI,  ACUTE CONGESTIVE HEART FAILURE      Critical care was time spent personally by me on the following activities: development of treatment plan with patient or surrogate and bedside caregivers, discussions with consultants, evaluation of patient's response to treatment, examination of patient, ordering and performing treatments and interventions, ordering and review of laboratory studies, ordering and review of radiographic studies, pulse oximetry, re-evaluation of patient's condition. This critical care time did not overlap with that of any other provider or involve time for any procedures.    Thank you for your consult. I will follow-up with patient. Please contact us if you have any additional questions.       Ryan Johnson MD  Critical Care Medicine  [facility] -

## 2019-03-08 NOTE — PLAN OF CARE
Problem: Adult Inpatient Plan of Care  Goal: Plan of Care Review  Outcome: Ongoing (interventions implemented as appropriate)  poc reviewed c pt and son.  Some increased bradycardia as compared to previous shift, initially low bp, but recovering and remaining adequate per shift.  Pt turned q2h, no signs of dependent skin breakdown.  Some blanching redness noted to face when performing hygiene, mepilex in place on pt's face/nose.  No issues with desaturations, intermittent and regular tachypnea.  Afebrile overnight, bath given in am.  With bath, pt more arouseable, attempting to cooperate with turning. Crusty/ooze noted to pt's bilat eyes.  Flushed with NS. AM labs pending.

## 2019-03-08 NOTE — CONSULTS
Ashley Koenig [id number] is a 89 y.o. female who has been consulted for vancomycin dosing.  Consult ordered by: Ryan Johnson MD     Dx: Sepsis d/t PNA  Vancomycin goal trough = 15-20 mcg/mL  Concomitant abx: Cefepime 1 g every 8 hours, Doxycycline 100 mg every 12 hours.    Tmax: 99.9 F,   Blood cultures: Collected, NGTD  The patient has the following labs:     Date Creatinine (mg/dl)    BUN WBC Count   3/8/2019 Estimated Creatinine Clearance: 55.4 mL/min (based on SCr of 0.7 mg/dL). Lab Results   Component Value Date    BUN 14 03/07/2019     Lab Results   Component Value Date    WBC 21.56 (H) 03/07/2019      which calculates to an Estimated Creatinine Clearance: 55.4 mL/min (based on SCr of 0.7 mg/dL)..       Current weight is 79 kg (174 lb 4 oz)    The patient will be started on vancomycin at a dose of 1,250 mg every 24 hours. Due to the patient's age and immobility, I feel that her SCr is understated so the current dose and interval should produce the desired therapeutic trough range.  Patient will be followed by pharmacy for changes in renal function, toxicity, and efficacy.  The vancomycin trough has been ordered for 3/9 at 1900 prior to the 3rd dose.      Thank you for allowing us to participate in this patient's care.     Herminio Renner

## 2019-03-08 NOTE — PROGRESS NOTES
[facility] -   Critical Care Medicine  Progress Note    Patient Name: Ashley Koenig  MRN: [id number]  Admission Date: 3/6/2019  Hospital Length of Stay: 2 days  Code Status: DNR  Attending Provider: Darling Mcgovern MD  Primary Care Provider: Maggy Ngo MD   Principal Problem: Aspiration pneumonia    Subjective:     HPI:   89 year old female who  has a past medical history of Acute congestive heart failure (3/6/2019), Age-related macular degeneration, wet, both eyes, Anginal equivalent, Anxiety, Arthritis, BPPV (benign paroxysmal positional vertigo), Brain tumor, Clotting disorder, COPD (chronic obstructive pulmonary disease), Depression, Diastolic dysfunction, Hearing loss, Heart disease, Hyperlipidemia, Hypertension, Hypoxemia, Lung nodule, Meningioma, Multiple thyroid nodules, Pulmonary embolism, Pulmonary hypertension, Sleep apnea in adult, Stroke, Urinary incontinence, and Vitamin D deficiency admitted with acute respiratory failure with hypoxemia and hypercapnea. Transferred to the MICU on BIPAP. Patient is obtunded unable to provide history. Information obtained from chart review and discussion with health care personnel.     Hospital/ICU Course:  3/7: Seen and examined at bedside. Hospital chart reviewed. On BIPAP. Encephalopathic. Unable to provide history. CTA ruled out PE but showed bilateral consolidation lungs  3/8 - BiPAP removed this AM and has tolerated well fully awake and alert in no distress and wanting to eat.  Mild to mod confusion but has baseline dementia    Review of Systems   Constitutional: Negative for chills, fever and malaise/fatigue.   HENT: Negative for congestion.    Eyes: Negative for blurred vision.   Respiratory: Positive for cough and sputum production. Negative for shortness of breath.    Cardiovascular: Negative for chest pain and leg swelling.   Gastrointestinal: Negative for abdominal pain, nausea and vomiting.   Genitourinary: Negative for dysuria.    Musculoskeletal: Negative for myalgias.   Skin: Negative for rash.   Neurological: Positive for weakness. Negative for dizziness, focal weakness and headaches.   Endo/Heme/Allergies: Does not bruise/bleed easily.   Psychiatric/Behavioral: The patient is not nervous/anxious.        Objective:     Vital Signs (Most Recent):  Temp: 98.3 °F (36.8 °C) (03/08/19 1105)  Pulse: 74 (03/08/19 1412)  Resp: 15 (03/08/19 1412)  BP: (!) 133/48 (03/08/19 1200)  SpO2: 95 % (03/08/19 1412) Vital Signs (24h Range):  Temp:  [97.8 °F (36.6 °C)-98.4 °F (36.9 °C)] 98.3 °F (36.8 °C)  Pulse:  [45-81] 74  Resp:  [15-34] 15  SpO2:  [92 %-99 %] 95 %  BP: ()/(24-99) 133/48     Weight: 79.2 kg (174 lb 9.7 oz)  Body mass index is 27.4 kg/m².      Intake/Output Summary (Last 24 hours) at 3/8/2019 1417  Last data filed at 3/8/2019 1033  Gross per 24 hour   Intake 600 ml   Output 625 ml   Net -25 ml       Physical Exam   Constitutional: She appears well-developed and well-nourished. She is cooperative.  Non-toxic appearance. She does not have a sickly appearance. She appears ill. No distress. She is not intubated. Nasal cannula in place.   HENT:   Head: Normocephalic and atraumatic.   Mouth/Throat: Mucous membranes are dry (post wearing BiPAP).   Eyes: EOM and lids are normal. Pupils are equal, round, and reactive to light.   Neck: Trachea normal and full passive range of motion without pain. Carotid bruit is not present.   Cardiovascular: Normal rate and regular rhythm. Exam reveals distant heart sounds.   Pulses:       Radial pulses are 2+ on the right side, and 2+ on the left side.        Dorsalis pedis pulses are 1+ on the right side, and 1+ on the left side.   Pulmonary/Chest: Effort normal. No accessory muscle usage. No tachypnea. She is not intubated. No respiratory distress. She has decreased breath sounds in the right lower field and the left lower field. She has rales.   Abdominal: Soft. Normal appearance. She exhibits no  distension. Bowel sounds are decreased. There is no tenderness.   Musculoskeletal: Normal range of motion.        Right foot: There is no deformity.        Left foot: There is no deformity.   Trace LE edema   Lymphadenopathy:     She has no cervical adenopathy.   Neurological: She is alert.   Fully awake and alert and responsive with mild to mod confusion and baseline dementia   Skin: Skin is warm, dry and intact. Capillary refill takes less than 2 seconds. No rash noted. No cyanosis.   Psychiatric: She has a normal mood and affect. Her speech is normal and behavior is normal. She exhibits abnormal recent memory.       Vents:  Oxygen Concentration (%): 35 (03/08/19 0300)    Lines/Drains/Airways     Drain            Female External Urinary Catheter 03/07/19 1900 less than 1 day          Peripheral Intravenous Line                 Peripheral IV - Single Lumen 03/06/19 1200 Left Antecubital 2 days         Peripheral IV - Single Lumen 03/06/19 1210 Right Hand 2 days                Significant Labs:    CBC/Anemia Profile:  Recent Labs   Lab 03/07/19  0442 03/08/19  0358   WBC 21.56* 16.69*   HGB 12.8 12.9   HCT 40.4 41.8   * 285   * 108*   RDW 14.3 14.8*        Chemistries:  Recent Labs   Lab 03/07/19  0442 03/08/19  0358    141   K 3.5 3.9   CL 95 98   CO2 34* 35*   BUN 14 16   CREATININE 0.7 0.7   CALCIUM 9.5 9.3   ALBUMIN  --  2.3*   PROT  --  6.2   BILITOT  --  0.3   ALKPHOS  --  118   ALT  --  18   AST  --  22   MG  --  1.8       ABGs:   Recent Labs   Lab 03/08/19  0417   PH 7.349*   PCO2 72.2*   HCO3 39.8*   POCSATURATED 93*   BE 14     All pertinent labs within the past 24 hours have been reviewed.    Significant Imaging:  CXR: I have reviewed all pertinent results/findings within the past 24 hours and my personal findings are:  mild bibasal infiltrates        ABG  Recent Labs   Lab 03/08/19  0417   PH 7.349*   PO2 72*   PCO2 72.2*   HCO3 39.8*   BE 14     Assessment/Plan:            Dementia     Cont Lexapro               Pulmonary   * Aspiration pneumonia    ST swallow eval with dysphagia  Change to pureed diet with nectar thick liquids diet per ST recs  Aspiration precautions  Cont Zosyn and Duonebs  Stop Vanc       Acute hypoxemic respiratory failure    Cont low flow NC O2  Has been off NPPV for 6 hours and tolerating well  Leave BiPAP prn     Cardiac/Vascular   Coronary artery disease involving native coronary artery of native heart without angina pectoris    Cont Niacin and add ASA  Some bradycardia overnight will not start b-blocker     Acute on chronic diastolic congestive heart failure    Appears euvolumic  Monitor I/Os and daily weights                                     Preventive Measures and Monitoring:   Stress Ulcer: Pepcid  Nutrition: pureed cardiac diet  Glucose control: stable  Bowel prophylaxis: Miralax  DVT prophylaxis: LMWH/SCDs  Hx CAD on B-Blocker: had recent bradycardia  Head of Bed/Reposition: Elevate HOB and turn Q1-2 hours   Early Mobility: OOB tomorrow  IVAB Day: #2  Code Status: DNR  Pneumonia Vaccine: UTD  Flu Vaccine: UTD    Counseling/Consultation:I have discussed the care of this patient in detail with the bedside nursing staff and Dr. Johnson and Dr. Mcgovern.  Will transfer to [facility] and follow from pulm standpoint for another day and sign off if stable    Critical Care Time: 48 minutes  Critical secondary to Patient has a condition that poses threat to life and bodily function: Acute Hypoxic and Hypercapnea resp failure      Critical care was time spent personally by me on the following activities: development of treatment plan with patient or surrogate and bedside caregivers, discussions with consultants, evaluation of patient's response to treatment, examination of patient, ordering and performing treatments and interventions, ordering and review of laboratory studies, ordering and review of radiographic studies, pulse oximetry, re-evaluation of patient's condition. This  critical care time did not overlap with that of any other provider or involve time for any procedures.     James Uriarte NP  Critical Care Medicine  [facility] [place]

## 2019-03-08 NOTE — PROGRESS NOTES
[facility] Hospital Medicine  Progress Note    Patient Name: Ashley Koenig  MRN: [id number]  Patient Class: IP- Inpatient   Admission Date: 3/6/2019  Length of Stay: 2 days  Attending Physician: Darling Mcgovern MD  Primary Care Provider: Maggy Ngo MD        Subjective:     Principal Problem:Acute hypoxemic respiratory failure    HPI:  Ms. Koenig is a 89 yo fairly healthy white female who presented to the ED with her son complaining of SOB associated with a cough. Per the son this has been ongoing for about 1 week. Upon presentation she was found to be in some respiratory distress and hypoxia with oxygen saturation at 86%. She also was found down by family members but denies any LOC.     Chest Xray shows prominent size heart with interstitial opacities of both lungs with Carlee B-lines. Per the patient and her son there is no known history of CHF but he does give a history of PE which from history doesn't sound like it was provoked.    Hospital Course:  3/7/19: elderly female admitted yesterday with hypoxia and was thought to have decompensated diastolic dysfunction found this am very lethargic and ABG showed hypercapnia with Pco2 of 76. CTA ruled out PE but showed bilateral consolidation lungs    3/8/19: patient transferred to ICU yesterday and placed on Bipap but much more alert today. She was made DNR by her son. CXR is improved with some mild haziness of both lungs and small pleural effusion of left. Her WBC is trending down to 16K.    Interval History: 89 yo female admitted with respiratory failure secondary to PNA who clinically looks better on BiPAP but may be difficult to wean.    Review of Systems   Constitutional: Negative.    HENT: Negative.    Eyes: Negative.    Respiratory: Positive for cough and shortness of breath.    Cardiovascular: Negative.    Gastrointestinal: Negative.    Endocrine: Negative.    Genitourinary: Negative.    Musculoskeletal: Negative.    Skin: Negative.     Allergic/Immunologic: Negative.    Neurological: Negative.    Hematological: Negative.    Psychiatric/Behavioral: Negative.      Objective:     Vital Signs (Most Recent):  Temp: 98 °F (36.7 °C) (03/08/19 0300)  Pulse: 65 (03/08/19 0848)  Resp: 18 (03/08/19 0848)  BP: (!) 141/52 (03/08/19 0300)  SpO2: (!) 92 % (03/08/19 0848) Vital Signs (24h Range):  Temp:  [98 °F (36.7 °C)-98.5 °F (36.9 °C)] 98 °F (36.7 °C)  Pulse:  [45-94] 65  Resp:  [18-34] 18  SpO2:  [92 %-99 %] 92 %  BP: ()/(24-99) 141/52     Weight: 79.2 kg (174 lb 9.7 oz)  Body mass index is 27.4 kg/m².    Intake/Output Summary (Last 24 hours) at 3/8/2019 0854  Last data filed at 3/8/2019 0300  Gross per 24 hour   Intake 650 ml   Output 650 ml   Net 0 ml      Physical Exam   Constitutional: She appears well-developed.   Elderly ill appearing female in ICU on Bipap   HENT:   Head: Normocephalic and atraumatic.   Right Ear: External ear normal.   Left Ear: External ear normal.   Nose: Nose normal.   Mouth/Throat: Oropharynx is clear and moist.   Eyes: EOM are normal. Pupils are equal, round, and reactive to light.   Crusting of both eyelids   Neck: Normal range of motion. Neck supple.   Pulmonary/Chest: Effort normal.   Mildly coarse but improved   Abdominal: Soft. Bowel sounds are normal.   Genitourinary: Vagina normal.   Musculoskeletal: Normal range of motion.   Neurological: She is alert.   Skin: Skin is warm and dry. Capillary refill takes less than 2 seconds.   Psychiatric: She has a normal mood and affect.       Significant Labs:   Recent Lab Results       03/08/19  0417   03/08/19  0358   03/07/19  1332        Albumin   2.3       Alkaline Phosphatase   118       Allens Test Fail   Pass     ALT   18       Anion Gap   8       AST   22       BANDS   1.0       Baso #   CANCELED  Comment:  Result canceled by the ancillary.       Basophil%   0.0       Total Bilirubin   0.3  Comment:  For infants and newborns, interpretation of results should be  based  on gestational age, weight and in agreement with clinical  observations.  Premature Infant recommended reference ranges:  Up to 24 hours.............<8.0 mg/dL  Up to 48 hours............<12.0 mg/dL  3-5 days..................<15.0 mg/dL  6-29 days.................<15.0 mg/dL         Site LR   LR     BUN, Bld   16       Calcium   9.3       Chloride   98       CO2   35       Creatinine   0.7       DelSys CPAP/BiPAP   CPAP/BiPAP     Differential Method   Manual       eGFR if    >60       eGFR if non    >60  Comment:  Calculation used to obtain the estimated glomerular filtration  rate (eGFR) is the CKD-EPI equation.          Eos #   CANCELED  Comment:  Result canceled by the ancillary.       Eosinophil%   1.0       EP 7   8     FiO2 35   45     Glucose   80       Gran%   76.0       Hematocrit   41.8       Hemoglobin   12.9       IP 14   14     Lymph #   CANCELED  Comment:  Result canceled by the ancillary.       Lymph%   10.0       Magnesium   1.8       MCH   33.2       MCHC   30.9       MCV   108       Metamyelocytes   2.0       Mode BiPAP   BiPAP     Mono #   CANCELED  Comment:  Result canceled by the ancillary.       Mono%   8.0       MPV   9.9       Myelocytes   2.0       Platelets   285       POC BE 14   14     POC HCO3 39.8   39.7     POC PCO2 72.2   76.5     POC PH 7.349   7.323     POC PO2 72   131     POC SATURATED O2 93   98     Poik   Slight       Potassium   3.9       Total Protein   6.2       Rate 24   24     RBC   3.88       RDW   14.8       Sample ARTERIAL   ARTERIAL     Sodium   141       Spont Rate     26     Stomatocytes   Present       WBC   16.69             Significant Imaging:   Imaging Results          X-Ray Chest AP Portable (Final result)  Result time 03/06/19 12:15:59    Final result by OLEGARIO Sotelo Sr., MD (03/06/19 12:15:59)                 Impression:      1. The size of the heart is prominent. There has been interval development of a mild amount  of interstitial opacities seen in both lungs with Kerley B-lines visualized bilaterally.  This is characteristic of pulmonary edema.  2. The left costophrenic angle is opacified.  This is characteristic of a tiny pleural effusion.  .      Electronically signed by: Daniel Sotelo MD  Date:    03/06/2019  Time:    12:15             Narrative:    EXAMINATION:  XR CHEST AP PORTABLE    CLINICAL HISTORY:  Sepsis;    COMPARISON:  12/17/2018    FINDINGS:  The size of the heart is prominent.  There has been interval development of a mild amount of interstitial opacities seen in both lungs with Kerley B-lines visualized bilaterally.  There is no pneumothorax.  The right costophrenic angle sharp.  The left costophrenic angle is opacified.                                  Assessment/Plan:      * Acute hypoxemic respiratory failure    Given her presentation she has been given supplemental oxygen with sats up to 93%  I and concerned she could possible have another PE as she had in 2014 which appears to have been unprovoked and at the time recommended long term anticoagulation.    3/7/19:  Resolved as she is hypercapnic and after reviewing previous records there is a history of COPD.  It appears she has bilateral PNA will consolidations seen on CTA and no PE.  -transfer to ICU and place on Bipap with repeat ABG in about 1 hour  -consult Pulmonology     3/8/19: still on BiPAP but will attempt to wean as she is now DNR and may not tolerate being off as discussion will be made with her son today. This is secondary to underlying PNA with possible underlying aspiration       Acute congestive heart failure    She appears to be in acute failure but unclear if systolic/diastolic or combined dysfunction.  Will check 2D echo, she has been given lasix and from her history no known risk factors other than possible PNA vs PE  F/u echo and obtain stat CTA and given she is frail and has been given lasix will closely monitor her renal function  as didn't want to obtain contrast but must given her history, empiric abx given she has an elevated WBC of 22K.    3/7/19: doubt if all CHF as she has diastolic dysfunction and my be mildly decompensated because of PNA. She was given IV lasix 20 mg in the ED yesterday afternoon and has but out about 600 cc of urine since last night. Her Troponin was slightly elevated but has remained stable on serial and no history of chest pain given so I suspect this was some mild demand ischemia from PNA. Echo eith normal LVF 60-65, Pulmonary HTN and diastolic dysfunction.    3/8/19: she did get a once dose of lasix on presentation but I feel she is clinically compensated     Sepsis due to pneumonia    She presented with elevated WBC, HR, and hypoxia with no obvious source of infection only asymptomatic bacteruria which can be cause of her Leukocytosis but underlying PNA not completely excluded so will cover with Rocephin/Doxycycline    3/7/19: it appears she has underlying bilateral PNA  Continue current ans and f/u blood cultures    3/8/19: WBC trending down and cultures are negative  Resume current Abx       Acute cystitis without hematuria    She denies any symptoms but given her elevated WBC and UA cannot exclude as I feel her vitals and presentation related more to Respiratory.       Anxiety    Will resume her Xanax PRN         VTE Risk Mitigation (From admission, onward)        Ordered     enoxaparin injection 40 mg  Daily      03/06/19 1358     IP VTE HIGH RISK PATIENT  Once      03/06/19 1530     Place sequential compression device  Until discontinued      03/06/19 1530          Critical care time spent on the evaluation and treatment of severe organ dysfunction, review of pertinent labs and imaging studies, discussions with consulting providers and discussions with patient/family: 45 minutes.    Darling Mcgovern MD  Department of Hospital Medicine   [facility]

## 2019-03-08 NOTE — ASSESSMENT & PLAN NOTE
ST swallow eval with dysphagia  Change to pureed diet with nectar thick liquids diet per ST recs  Aspiration precautions  Cont Zosyn and Crowbs  Stop Vanc

## 2019-03-08 NOTE — HOSPITAL COURSE
3/7: Seen and examined at bedside. Hospital chart reviewed. On BIPAP. Encephalopathic. Unable to provide history. CTA ruled out PE but showed bilateral consolidation lungs  3/8 - BiPAP removed this AM and has tolerated well fully awake and alert in no distress and wanting to eat.  Mild to mod confusion but has baseline dementia.  3/9 - Seen and examined at bedside. Overnights events noted. Doing much better and off BiPAP on nasal cannula. SLT evaluation => oropharyngeal dysphagia and an increased risk of aspiration. Broaden IV antibiotic coverage to cover Aspiration Pneumonia.   3/10 - Seen and examined at bedside. No significant overnight events noted. Continues to do well.

## 2019-03-08 NOTE — ASSESSMENT & PLAN NOTE
She presented with elevated WBC, HR, and hypoxia with no obvious source of infection only asymptomatic bacteruria which can be cause of her Leukocytosis but underlying PNA not completely excluded so will cover with Rocephin/Doxycycline    3/7/19: it appears she has underlying bilateral PNA  Continue current ans and f/u blood cultures    3/8/19: WBC trending down and cultures are negative  Resume current Abx

## 2019-03-08 NOTE — ASSESSMENT & PLAN NOTE
She appears to be in acute failure but unclear if systolic/diastolic or combined dysfunction.  Will check 2D echo, she has been given lasix and from her history no known risk factors other than possible PNA vs PE  F/u echo and obtain stat CTA and given she is frail and has been given lasix will closely monitor her renal function as didn't want to obtain contrast but must given her history, empiric abx given she has an elevated WBC of 22K.    3/7/19: doubt if all CHF as she has diastolic dysfunction and my be mildly decompensated because of PNA. She was given IV lasix 20 mg in the ED yesterday afternoon and has but out about 600 cc of urine since last night. Her Troponin was slightly elevated but has remained stable on serial and no history of chest pain given so I suspect this was some mild demand ischemia from PNA. Echo eith normal LVF 60-65, Pulmonary HTN and diastolic dysfunction.    3/8/19: she did get a once dose of lasix on presentation but I feel she is clinically compensated

## 2019-03-08 NOTE — SUBJECTIVE & OBJECTIVE
Interval History: 89 yo female admitted with respiratory failure secondary to PNA who clinically looks better on BiPAP but may be difficult to wean.    Review of Systems   Constitutional: Negative.    HENT: Negative.    Eyes: Negative.    Respiratory: Positive for cough and shortness of breath.    Cardiovascular: Negative.    Gastrointestinal: Negative.    Endocrine: Negative.    Genitourinary: Negative.    Musculoskeletal: Negative.    Skin: Negative.    Allergic/Immunologic: Negative.    Neurological: Negative.    Hematological: Negative.    Psychiatric/Behavioral: Negative.      Objective:     Vital Signs (Most Recent):  Temp: 98 °F (36.7 °C) (03/08/19 0300)  Pulse: 65 (03/08/19 0848)  Resp: 18 (03/08/19 0848)  BP: (!) 141/52 (03/08/19 0300)  SpO2: (!) 92 % (03/08/19 0848) Vital Signs (24h Range):  Temp:  [98 °F (36.7 °C)-98.5 °F (36.9 °C)] 98 °F (36.7 °C)  Pulse:  [45-94] 65  Resp:  [18-34] 18  SpO2:  [92 %-99 %] 92 %  BP: ()/(24-99) 141/52     Weight: 79.2 kg (174 lb 9.7 oz)  Body mass index is 27.4 kg/m².    Intake/Output Summary (Last 24 hours) at 3/8/2019 0854  Last data filed at 3/8/2019 0300  Gross per 24 hour   Intake 650 ml   Output 650 ml   Net 0 ml      Physical Exam   Constitutional: She appears well-developed.   Elderly ill appearing female in ICU on Bipap   HENT:   Head: Normocephalic and atraumatic.   Right Ear: External ear normal.   Left Ear: External ear normal.   Nose: Nose normal.   Mouth/Throat: Oropharynx is clear and moist.   Eyes: EOM are normal. Pupils are equal, round, and reactive to light.   Crusting of both eyelids   Neck: Normal range of motion. Neck supple.   Pulmonary/Chest: Effort normal.   Mildly coarse but improved   Abdominal: Soft. Bowel sounds are normal.   Genitourinary: Vagina normal.   Musculoskeletal: Normal range of motion.   Neurological: She is alert.   Skin: Skin is warm and dry. Capillary refill takes less than 2 seconds.   Psychiatric: She has a normal mood  and affect.       Significant Labs:   Recent Lab Results       03/08/19  0417   03/08/19  0358   03/07/19  1332        Albumin   2.3       Alkaline Phosphatase   118       Allens Test Fail   Pass     ALT   18       Anion Gap   8       AST   22       BANDS   1.0       Baso #   CANCELED  Comment:  Result canceled by the ancillary.       Basophil%   0.0       Total Bilirubin   0.3  Comment:  For infants and newborns, interpretation of results should be based  on gestational age, weight and in agreement with clinical  observations.  Premature Infant recommended reference ranges:  Up to 24 hours.............<8.0 mg/dL  Up to 48 hours............<12.0 mg/dL  3-5 days..................<15.0 mg/dL  6-29 days.................<15.0 mg/dL         Site LR   LR     BUN, Bld   16       Calcium   9.3       Chloride   98       CO2   35       Creatinine   0.7       DelSys CPAP/BiPAP   CPAP/BiPAP     Differential Method   Manual       eGFR if    >60       eGFR if non    >60  Comment:  Calculation used to obtain the estimated glomerular filtration  rate (eGFR) is the CKD-EPI equation.          Eos #   CANCELED  Comment:  Result canceled by the ancillary.       Eosinophil%   1.0       EP 7   8     FiO2 35   45     Glucose   80       Gran%   76.0       Hematocrit   41.8       Hemoglobin   12.9       IP 14   14     Lymph #   CANCELED  Comment:  Result canceled by the ancillary.       Lymph%   10.0       Magnesium   1.8       MCH   33.2       MCHC   30.9       MCV   108       Metamyelocytes   2.0       Mode BiPAP   BiPAP     Mono #   CANCELED  Comment:  Result canceled by the ancillary.       Mono%   8.0       MPV   9.9       Myelocytes   2.0       Platelets   285       POC BE 14   14     POC HCO3 39.8   39.7     POC PCO2 72.2   76.5     POC PH 7.349   7.323     POC PO2 72   131     POC SATURATED O2 93   98     Poik   Slight       Potassium   3.9       Total Protein   6.2       Rate 24   24     RBC   3.88        RDW   14.8       Sample ARTERIAL   ARTERIAL     Sodium   141       Spont Rate     26     Stomatocytes   Present       WBC   16.69             Significant Imaging:   Imaging Results          X-Ray Chest AP Portable (Final result)  Result time 03/06/19 12:15:59    Final result by OLEGARIO Sotelo Sr., MD (03/06/19 12:15:59)                 Impression:      1. The size of the heart is prominent. There has been interval development of a mild amount of interstitial opacities seen in both lungs with Kerley B-lines visualized bilaterally.  This is characteristic of pulmonary edema.  2. The left costophrenic angle is opacified.  This is characteristic of a tiny pleural effusion.  .      Electronically signed by: Daniel Sotelo MD  Date:    03/06/2019  Time:    12:15             Narrative:    EXAMINATION:  XR CHEST AP PORTABLE    CLINICAL HISTORY:  Sepsis;    COMPARISON:  12/17/2018    FINDINGS:  The size of the heart is prominent.  There has been interval development of a mild amount of interstitial opacities seen in both lungs with Kerley B-lines visualized bilaterally.  There is no pneumothorax.  The right costophrenic angle sharp.  The left costophrenic angle is opacified.

## 2019-03-08 NOTE — ASSESSMENT & PLAN NOTE
BIPAP 14 / 8 40% FIO2. Serial ABG to assess gas exchange. Keep SAO2 >= 92%.  . Bronchodilators per orders

## 2019-03-08 NOTE — PROGRESS NOTES
Charles's test preformed and passed. Failed Charles's test put in [facility] by error.   Results for KEAGAN HILLS (MRN [id number]) as of 3/8/2019   3/8/2019 04:17   POC PH 7.349 (L)   POC PCO2 72.2 (HH)   POC PO2 72 (L)   POC BE 14   POC HCO3 39.8 (H)   POC SATURATED O2 93 (L)   FiO2 35   Sample ARTERIAL   DelSys CPAP/BiPAP   Allens Test PASS   Site LR   Mode BiPAP

## 2019-03-08 NOTE — PROGRESS NOTES
Had long conference with son at bedside and discussed patient's current condition with resp failure and Asp PNA.  Patient still with encephalopathy and on BiPAP.  Son stated patient would not want emergent resuscitative measures and he made her a DNR/DNI.    ANABELLE Uriarte [facility]

## 2019-03-08 NOTE — ASSESSMENT & PLAN NOTE
Vitals:    03/07/19 1530 03/07/19 1600 03/07/19 1630 03/07/19 1715   BP: (!) 145/48 (!) 115/41 125/64 (!) 122/43   BP Location:       Patient Position:       Pulse: 73 73 73 70   Resp: (!) 25 (!) 31 (!) 34 (!) 30   Temp:       TempSrc:       SpO2: 96% 97% 96% 96%   Weight:       Height:                 [x] Respiratory rate >20 breaths/min or PaCO2 <32 mmHg   [x]  WBC >12,000 cells/mm3, <4000 cells/mm3, or >10 percent immature (band) forms  [] Heart rate >90 beats/min   [] Temperature >38ºC or <36ºC    Microbiology: Panculture.   Oxygenation: NIPPV - BIPAP 14 / 8  Keep SAO2 > = 92%  Hemodynamics:Keep MAP > = 65mmHg  Source control: IV MAXIPIME, VANCOMYCIN ( ASPIRATION PNEUMONIA)      Microbiology Results (last 7 days)     Procedure Component Value Units Date/Time    Blood culture x two cultures. Draw prior to antibiotics. [[id number]] Collected:  03/06/19 1200    Order Status:  Completed Specimen:  Blood from Peripheral, Antecubital, Left Updated:  03/07/19 0915     Blood Culture, Routine No Growth to date    Narrative:       Aerobic and anaerobic    Blood culture x two cultures. Draw prior to antibiotics. [[id number]] Collected:  03/06/19 1210    Order Status:  Completed Specimen:  Blood from Peripheral, Hand, Right Updated:  03/07/19 0915     Blood Culture, Routine No Growth to date    Narrative:       Aerobic and anaerobic    Influenza A & B by Molecular [[id number]] Collected:  03/06/19 1210    Order Status:  Completed Specimen:  Nasopharyngeal Swab Updated:  03/06/19 1249     Influenza A, Molecular Negative     Influenza B, Molecular Negative     Flu A & B Source Nasal swab

## 2019-03-08 NOTE — SUBJECTIVE & OBJECTIVE
Past Medical History:   Diagnosis Date    Acute congestive heart failure 3/6/2019    Age-related macular degeneration, wet, both eyes     Anginal equivalent     Anxiety     Arthritis     BPPV (benign paroxysmal positional vertigo)     Brain tumor     Clotting disorder     lung    COPD (chronic obstructive pulmonary disease)     Depression     Diastolic dysfunction     Hearing loss     Heart disease     Hyperlipidemia     Hypertension     Hypoxemia     Lung nodule     Meningioma     Multiple thyroid nodules     Pulmonary embolism     Pulmonary hypertension     Sleep apnea in adult     Stroke     per eye exam, never been treated for    Urinary incontinence     Vitamin D deficiency        Past Surgical History:   Procedure Laterality Date    CATARACT EXTRACTION Bilateral     TOTAL HIP ARTHROPLASTY Right 08/20/2016    francoise witt       Review of patient's allergies indicates:  No Known Allergies    Scheduled Meds:   albuterol-ipratropium  3 mL Nebulization Q6H WAKE    ceFEPime (MAXIPIME) IVPB  1 g Intravenous Q8H    doxycycline (VIBRAMYCIN) IVPB  100 mg Intravenous Q12H    enoxaparin  40 mg Subcutaneous Daily    escitalopram oxalate  20 mg Oral Daily    famotidine (PF)  20 mg Intravenous Daily    niacin  100 mg Oral QHS    polyethylene glycol  17 g Oral Daily    sodium chloride 0.9%  3 mL Intravenous Q8H     Continuous Infusions:  PRN Meds:.acetaminophen, loperamide, ondansetron, sodium chloride 0.9%       Family History     Problem Relation (Age of Onset)    Alzheimer's disease Father, Daughter    No Known Problems Son        Tobacco Use    Smoking status: Never Smoker    Smokeless tobacco: Never Used   Substance and Sexual Activity    Alcohol use: No    Drug use: No    Sexual activity: Not Currently         Review of Systems   Unable to perform ROS: Mental status change     Objective:     Vital Signs (Most Recent):  Temp: 98.3 °F (36.8 °C) (03/07/19 1505)  Pulse: 70 (03/07/19  1715)  Resp: (!) 30 (03/07/19 1715)  BP: (!) 122/43 (03/07/19 1715)  SpO2: 96 % (03/07/19 1715) Vital Signs (24h Range):  Temp:  [98.2 °F (36.8 °C)-99.9 °F (37.7 °C)] 98.3 °F (36.8 °C)  Pulse:  [55-94] 70  Resp:  [20-34] 30  SpO2:  [85 %-99 %] 96 %  BP: (107-195)/(36-93) 122/43     Weight: 79 kg (174 lb 4 oz)  Body mass index is 27.35 kg/m².      Intake/Output Summary (Last 24 hours) at 3/7/2019 1924  Last data filed at 3/7/2019 1740  Gross per 24 hour   Intake 670 ml   Output 675 ml   Net -5 ml       Physical Exam   Constitutional: She appears well-developed. She appears lethargic.   Sick appearing female   HENT:   Head: Normocephalic and atraumatic.   Right Ear: External ear normal.   Left Ear: External ear normal.   Nose: Nose normal.   Mouth/Throat: Oropharynx is clear and moist.   Eyes: Pupils are equal, round, and reactive to light.   Neck: Normal range of motion. Neck supple.   Cardiovascular: Normal rate, regular rhythm, normal heart sounds and intact distal pulses.   Pulmonary/Chest: Tachypnea noted.   Coarse bilaterally   Abdominal: Soft. Bowel sounds are normal.   Genitourinary: Vagina normal.   Neurological: She appears lethargic.   Skin: Skin is warm and dry. Capillary refill takes less than 2 seconds.   Nursing note and vitals reviewed.      Vents:  Oxygen Concentration (%): 35 (03/07/19 1711)    Lines/Drains/Airways     Peripheral Intravenous Line                 Peripheral IV - Single Lumen 03/06/19 1200 Left Antecubital 1 day         Peripheral IV - Single Lumen 03/06/19 1210 Right Hand 1 day                Significant Labs:    CBC/Anemia Profile:  Recent Labs   Lab 03/06/19  1210 03/07/19  0442   WBC 22.73* 21.56*   HGB 13.9 12.8   HCT 42.9 40.4   * 397*   * 105*   RDW 14.3 14.3        Chemistries:  Recent Labs   Lab 03/06/19  1210 03/07/19  0442    139   K 3.6 3.5   CL 98 95   CO2 33* 34*   BUN 17 14   CREATININE 0.8 0.7   CALCIUM 9.8 9.5   ALBUMIN 2.8*  --    PROT 7.1  --     BILITOT 0.4  --    ALKPHOS 159*  --    ALT 20  --    AST 22  --    MG 1.9  --    PHOS 2.9  --        ABGs:   Recent Labs   Lab 03/07/19  1332   PH 7.323*   PCO2 76.5*   HCO3 39.7*   POCSATURATED 98   BE 14     Coagulation:   Recent Labs   Lab 03/06/19  1210   INR 1.0   APTT 32.8*     Lactic Acid:   Recent Labs   Lab 03/06/19  1210 03/06/19  1600   LACTATE 0.8 1.0     Troponin:   Recent Labs   Lab 03/06/19  1210 03/06/19  1756 03/06/19  2222   TROPONINI 0.047* 0.040* 0.046*     Urine Studies:   Recent Labs   Lab 03/06/19  1325   COLORU Yellow   APPEARANCEUA Clear   PHUR 6.0   SPECGRAV >=1.030*   PROTEINUA 2+*   GLUCUA Negative   KETONESU Negative   BILIRUBINUA Negative   OCCULTUA Negative   NITRITE Positive*   UROBILINOGEN Negative   LEUKOCYTESUR Negative   RBCUA 2   WBCUA 1   BACTERIA Moderate*   HYALINECASTS 0       Significant Imaging:     CTA Chest Non Coronary 03/06/19    No CT evidence of pulmonary embolism.  Motion artifact obscures detail.  Patchy areas of consolidation in the lung bases right greater than left which may represent pneumonia with additional area of consolidation in the medial right middle lobe.  Follow-up recommended.

## 2019-03-09 LAB
ALBUMIN SERPL BCP-MCNC: 2.4 G/DL
ALP SERPL-CCNC: 114 U/L
ALT SERPL W/O P-5'-P-CCNC: 14 U/L
ANION GAP SERPL CALC-SCNC: 9 MMOL/L
AST SERPL-CCNC: 24 U/L
BASO STIPL BLD QL SMEAR: ABNORMAL
BASOPHILS NFR BLD: 0 %
BILIRUB SERPL-MCNC: 0.5 MG/DL
BUN SERPL-MCNC: 17 MG/DL
CALCIUM SERPL-MCNC: 9.3 MG/DL
CHLORIDE SERPL-SCNC: 96 MMOL/L
CO2 SERPL-SCNC: 38 MMOL/L
CREAT SERPL-MCNC: 0.8 MG/DL
DACRYOCYTES BLD QL SMEAR: ABNORMAL
DIFFERENTIAL METHOD: ABNORMAL
EOSINOPHIL NFR BLD: 1 %
ERYTHROCYTE [DISTWIDTH] IN BLOOD BY AUTOMATED COUNT: 14.3 %
EST. GFR  (AFRICAN AMERICAN): >60 ML/MIN/1.73 M^2
EST. GFR  (NON AFRICAN AMERICAN): >60 ML/MIN/1.73 M^2
GLUCOSE SERPL-MCNC: 94 MG/DL
HCT VFR BLD AUTO: 42.2 %
HGB BLD-MCNC: 13.2 G/DL
LYMPHOCYTES NFR BLD: 10 %
MAGNESIUM SERPL-MCNC: 2 MG/DL
MCH RBC QN AUTO: 33.3 PG
MCHC RBC AUTO-ENTMCNC: 31.3 G/DL
MCV RBC AUTO: 107 FL
MONOCYTES NFR BLD: 9 %
NEUTROPHILS NFR BLD: 75 %
NEUTS BAND NFR BLD MANUAL: 5 %
PLATELET # BLD AUTO: 384 K/UL
PLATELET BLD QL SMEAR: ABNORMAL
PMV BLD AUTO: 9.3 FL
POLYCHROMASIA BLD QL SMEAR: ABNORMAL
POTASSIUM SERPL-SCNC: 3.8 MMOL/L
PROT SERPL-MCNC: 6.5 G/DL
RBC # BLD AUTO: 3.96 M/UL
SODIUM SERPL-SCNC: 143 MMOL/L
STOMATOCYTES BLD QL SMEAR: PRESENT
TARGETS BLD QL SMEAR: ABNORMAL
WBC # BLD AUTO: 18.22 K/UL

## 2019-03-09 PROCEDURE — 25000003 PHARM REV CODE 250: Performed by: FAMILY MEDICINE

## 2019-03-09 PROCEDURE — 94640 AIRWAY INHALATION TREATMENT: CPT

## 2019-03-09 PROCEDURE — 83735 ASSAY OF MAGNESIUM: CPT

## 2019-03-09 PROCEDURE — 63600175 PHARM REV CODE 636 W HCPCS: Performed by: NURSE PRACTITIONER

## 2019-03-09 PROCEDURE — 99233 PR SUBSEQUENT HOSPITAL CARE,LEVL III: ICD-10-PCS | Mod: ,,, | Performed by: INTERNAL MEDICINE

## 2019-03-09 PROCEDURE — 85007 BL SMEAR W/DIFF WBC COUNT: CPT

## 2019-03-09 PROCEDURE — 63600175 PHARM REV CODE 636 W HCPCS: Performed by: FAMILY MEDICINE

## 2019-03-09 PROCEDURE — 25000003 PHARM REV CODE 250: Performed by: NURSE PRACTITIONER

## 2019-03-09 PROCEDURE — 27000221 HC OXYGEN, UP TO 24 HOURS

## 2019-03-09 PROCEDURE — 25000242 PHARM REV CODE 250 ALT 637 W/ HCPCS: Performed by: FAMILY MEDICINE

## 2019-03-09 PROCEDURE — 21400001 HC TELEMETRY ROOM

## 2019-03-09 PROCEDURE — 36415 COLL VENOUS BLD VENIPUNCTURE: CPT

## 2019-03-09 PROCEDURE — 97167 OT EVAL HIGH COMPLEX 60 MIN: CPT

## 2019-03-09 PROCEDURE — 99233 SBSQ HOSP IP/OBS HIGH 50: CPT | Mod: ,,, | Performed by: INTERNAL MEDICINE

## 2019-03-09 PROCEDURE — 80053 COMPREHEN METABOLIC PANEL: CPT

## 2019-03-09 PROCEDURE — A4216 STERILE WATER/SALINE, 10 ML: HCPCS | Performed by: FAMILY MEDICINE

## 2019-03-09 PROCEDURE — 85027 COMPLETE CBC AUTOMATED: CPT

## 2019-03-09 PROCEDURE — 97535 SELF CARE MNGMENT TRAINING: CPT

## 2019-03-09 RX ADMIN — IPRATROPIUM BROMIDE AND ALBUTEROL SULFATE 3 ML: .5; 3 SOLUTION RESPIRATORY (INHALATION) at 01:03

## 2019-03-09 RX ADMIN — ENOXAPARIN SODIUM 40 MG: 100 INJECTION SUBCUTANEOUS at 04:03

## 2019-03-09 RX ADMIN — Medication 3 ML: at 04:03

## 2019-03-09 RX ADMIN — PIPERACILLIN AND TAZOBACTAM 4.5 G: 4; .5 INJECTION, POWDER, LYOPHILIZED, FOR SOLUTION INTRAVENOUS; PARENTERAL at 06:03

## 2019-03-09 RX ADMIN — Medication 3 ML: at 10:03

## 2019-03-09 RX ADMIN — Medication 3 ML: at 05:03

## 2019-03-09 RX ADMIN — IPRATROPIUM BROMIDE AND ALBUTEROL SULFATE 3 ML: .5; 3 SOLUTION RESPIRATORY (INHALATION) at 07:03

## 2019-03-09 RX ADMIN — ACETAMINOPHEN 650 MG: 325 TABLET ORAL at 10:03

## 2019-03-09 RX ADMIN — FAMOTIDINE 20 MG: 20 TABLET, FILM COATED ORAL at 09:03

## 2019-03-09 RX ADMIN — PIPERACILLIN AND TAZOBACTAM 4.5 G: 4; .5 INJECTION, POWDER, LYOPHILIZED, FOR SOLUTION INTRAVENOUS; PARENTERAL at 09:03

## 2019-03-09 RX ADMIN — ASPIRIN 81 MG: 81 TABLET, COATED ORAL at 09:03

## 2019-03-09 RX ADMIN — ESCITALOPRAM OXALATE 20 MG: 10 TABLET, FILM COATED ORAL at 09:03

## 2019-03-09 RX ADMIN — Medication 100 MG: at 10:03

## 2019-03-09 RX ADMIN — PIPERACILLIN AND TAZOBACTAM 4.5 G: 4; .5 INJECTION, POWDER, LYOPHILIZED, FOR SOLUTION INTRAVENOUS; PARENTERAL at 01:03

## 2019-03-09 RX ADMIN — POLYETHYLENE GLYCOL 3350 17 G: 17 POWDER, FOR SOLUTION ORAL at 09:03

## 2019-03-09 NOTE — NURSING
Pt arrived to unit from ICU. Bedside report received from [facility] ICU. Pt in no acute distress. VSS. Supplemental O2 at 2LPM via NC. Telemetry monitor [id number] applied and verified with monitor tech. Pure wick in place hooked to low continuous wall suction. SCDs applied. Bed alarm set. Bed low, side rails up x2, call light within reach. Family present at bedside. Instructed to call for assistance. Will hand off report to primary nurse RADHA Jc.

## 2019-03-09 NOTE — PLAN OF CARE
Problem: Adult Inpatient Plan of Care  Goal: Plan of Care Review  Outcome: Ongoing (interventions implemented as appropriate)  Patient currently resting quietly in bed. VS currently stable. Patient currently disoriented to place, time, and situation. Patient sinus bradycardic on monitor at this time. Patient currently receiving oxygen via 2.5L nasal cannula. Patient has no complaints of shortness of breathe. Patient encouraged to deep breathe. Patient encouraged turned in bed every 2 hours to avoid skin breakdown to back side. Patient turns with 1 assist and is positioned with wedge. No sign of wound development or skin breakdown noted. Patient encouraged to use call light for all needs and educated on fall prevention. Plan of care updated with patient and family. There are no further questions after updated on plan of care at this time. Will continue to monitor.

## 2019-03-09 NOTE — NURSING
Pt assisted with max assist from ICU bed to telemetry bed. Telemetry monitor placed. Pt transported from ICU bed 5 to 228. Pt in NAD at time of transfer, VSS. Bedside report given to nurse Sanford. Attempted to call elijah Farris to notify of room transfer and no answer.

## 2019-03-09 NOTE — PT/OT/SLP EVAL
Occupational Therapy   Evaluation    Name: Ashley Koenig  MRN: [id number]  Admitting Diagnosis:  Aspiration pneumonia      Recommendations:     Discharge Recommendations: (SNF VS. INPT REHAB)  Discharge Equipment Recommendations:  commode  Barriers to discharge:  (TBD)    Assessment:     Ashley Koenig is a 89 y.o. female with a medical diagnosis of Aspiration pneumonia.  She presents with SELF CARE DEBILITY. Performance deficits affecting function: weakness, impaired endurance, impaired self care skills, impaired functional mobilty, gait instability, impaired balance, decreased coordination, decreased upper extremity function, pain, decreased ROM, impaired coordination.      Rehab Prognosis: Good; patient would benefit from acute skilled OT services to address these deficits and reach maximum level of function.       Plan:     Patient to be seen 3 x/week to address the above listed problems via self-care/home management, therapeutic exercises, therapeutic activities  · Plan of Care Expires: 03/16/19  · Plan of Care Reviewed with: patient    Subjective     Chief Complaint: PT C/O FALL LAST WEEK.  Patient/Family Comments/goals: NONE STATED.    Occupational Profile:  Living Environment: PT STATED SHE MOVED FROM [place] 1-2 YEARS AGO TO LIVE WITH HER SON AND DTR-IN-LAW.  Previous level of function: PT STATED SHE WAS DRESSING & SHOWERING WITHOUT (A) AND DTR-IN-LAW WAS PERFORMING MEAL PREP AND HOUSEKEEPING.  Roles and Routines:  PT STATED SHE WAS DRESSING & SHOWERING WITHOUT (A) AND DTR-IN-LAW WAS PERFORMING MEAL PREP AND HOUSEKEEPING.  Equipment Used at Home:  shower chair, rollator, grab bar(WALK-IN SHOWER)  Assistance upon Discharge: SON AND DTR-IN-LAW AVAILABLE TO (A) PT.    Pain/Comfort:  · Pain Rating 1: 0/10    Patients cultural, spiritual, Scientologist conflicts given the current situation: no    Objective:     Communicated with: NURSE AND Epic CHART REVIEW prior to session.  Patient found supine with oxygen, blood  pressure cuff, peripheral IV, telemetry, SCD(EXTERNAL CATHETER) upon OT entry to room.    General Precautions: Standard, aspiration, respiratory, fall   Orthopedic Precautions:N/A   Braces: N/A     Occupational Performance:    Bed Mobility:    · Patient completed Rolling/Turning to Left with  contact guard assistance  · Patient completed Scooting/Bridging with CGA SEATED BUT MOD (A) SUPINE  · Patient completed Supine to Sit with contact guard assistance  · Patient completed Sit to Supine with minimum assistance    Functional Mobility/Transfers:  · Patient completed Sit <> Stand Transfer with moderate assistance  with  rolling walker   · Patient completed Toilet Transfer Stand Pivot technique with moderate assistance with  rolling walker and bedside commode  · Functional Mobility: MOD (A) WITH RW AT EOB WITH SIDESTEPPING TO HOB.    Activities of Daily Living:  · Grooming: stand by assistance      · Upper Body Dressing: moderate assistance      · Lower Body Dressing: maximal assistance  WITH (B) SOCKS  · Toileting: moderate assistance WITH BSC EOB.    Cognitive/Visual Perceptual:  Cognitive/Psychosocial Skills:     -       Follows Commands/attention:PT'S ABILITY TO FOLLOW COMMANDS INFLUENCED BY POOR HEARING.  PT WITH (R) HEARING AID WHICH SHE STATED DOESN'T WORK.  NO (L) HEARING AID.  -       Communication: clear/fluent  -       Mood/Affect/Coping skills/emotional control: Appropriate to situation    Physical Exam:  Balance:    -       MOD (A) WITH STATIC/DYNAMIC STANDING BALANCE.  (S) WITH STATIC SITTING BALANCE.  CGA WITH DYNAMIC SITTING BALANCE.  Postural examination/scapula alignment:    -       No postural abnormalities identified  Skin integrity: Visible skin intact  Motor Planning:    -       INTACT  Upper Extremity Range of Motion:     -       Right Upper Extremity: Deficits: PT WITH (B) SHLD FLEXION ONLY TO 90 DEGREES  -       Left Upper Extremity: PT WITH (B) SHLD FLEXION ONLY TO 90 DEGREES.    [facility] 6 Click  ADL:  AMPAC Total Score: 14    Treatment & Education:  EVAL COMPLETED TODAY.  PT ALSO SAT EOB WITH (S) TO COMB HAIR WITH NO (A).  PT PRACTICED DONNING/DOFFING SOCKS WITH DIFFICULTY TODAY.  PT EDUCATED PURPOSE OF OT TO INCREASE SAFETY/(I) WITH ALL LEVELS OF SELF CARE.  Education:    Patient left supine with all lines intact, call button in reach, bed alarm on, NURSE notified and DTR-IN-LAW present    GOALS:   Multidisciplinary Problems     Occupational Therapy Goals        Problem: Occupational Therapy Goal    Goal Priority Disciplines Outcome Interventions   Occupational Therapy Goal     OT, PT/OT Ongoing (interventions implemented as appropriate)                    History:     Past Medical History:   Diagnosis Date    Acute congestive heart failure 3/6/2019    Age-related macular degeneration, wet, both eyes     Anginal equivalent     Anxiety     Arthritis     BPPV (benign paroxysmal positional vertigo)     Brain tumor     Clotting disorder     lung    COPD (chronic obstructive pulmonary disease)     Depression     Diastolic dysfunction     Hearing loss     Heart disease     Hyperlipidemia     Hypertension     Hypoxemia     Lung nodule     Meningioma     Multiple thyroid nodules     Pulmonary embolism     Pulmonary hypertension     Sepsis due to pneumonia     Sleep apnea in adult     Stroke     per eye exam, never been treated for    Urinary incontinence     Vitamin D deficiency        Past Surgical History:   Procedure Laterality Date    CATARACT EXTRACTION Bilateral     TOTAL HIP ARTHROPLASTY Right 08/20/2016    aafter fx       Time Tracking:     OT Date of Treatment: 03/09/19  OT Start Time: 1455  OT Stop Time: 1525  OT Total Time (min): 30 min    Billable Minutes:Evaluation 15  Self Care/Home Management 15    Sima Pardo OT  3/9/2019

## 2019-03-09 NOTE — SUBJECTIVE & OBJECTIVE
Interval History: 89 yo female with dementia and hearing loss admitted with Respiratory failure secondary to aspiration PNA. Clinically she is better.    Review of Systems   Constitutional: Negative.    HENT: Negative.    Eyes: Negative.    Respiratory: Positive for cough.    Cardiovascular: Negative.    Gastrointestinal: Negative.    Endocrine: Negative.    Genitourinary: Negative.    Musculoskeletal: Negative.    Skin: Negative.    Allergic/Immunologic: Negative.    Neurological: Negative.    Hematological: Negative.    Psychiatric/Behavioral: Negative.      Objective:     Vital Signs (Most Recent):  Temp: 98.3 °F (36.8 °C) (03/09/19 0730)  Pulse: 65 (03/09/19 0743)  Resp: (!) 23 (03/09/19 0743)  BP: (!) 148/60 (03/09/19 0730)  SpO2: 96 % (03/09/19 0743) Vital Signs (24h Range):  Temp:  [98.3 °F (36.8 °C)-99.2 °F (37.3 °C)] 98.3 °F (36.8 °C)  Pulse:  [57-81] 65  Resp:  [15-30] 23  SpO2:  [92 %-98 %] 96 %  BP: (113-148)/(41-97) 148/60     Weight: 81.1 kg (178 lb 12.7 oz)  Body mass index is 28.06 kg/m².    Intake/Output Summary (Last 24 hours) at 3/9/2019 0808  Last data filed at 3/9/2019 0600  Gross per 24 hour   Intake 420 ml   Output 700 ml   Net -280 ml      Physical Exam   Constitutional: She appears well-developed.   Sickly appearing   HENT:   Head: Normocephalic and atraumatic.   Right Ear: External ear normal.   Left Ear: External ear normal.   Nose: Nose normal.   Mouth/Throat: Oropharynx is clear and moist.   Eyes: EOM are normal. Pupils are equal, round, and reactive to light.   Neck: Normal range of motion. Neck supple.   Cardiovascular: Normal rate, regular rhythm and intact distal pulses.   Distant heart sounds   Pulmonary/Chest: Effort normal.   Slightly decreased at bases   Abdominal: Soft. Bowel sounds are normal.   Genitourinary: Vagina normal.   Neurological: She is alert.   Skin: Skin is warm and dry. Capillary refill takes less than 2 seconds.   Psychiatric: She has a normal mood and affect.    Nursing note and vitals reviewed.      Significant Labs:   Recent Lab Results       03/09/19  0405        Albumin 2.4     Alkaline Phosphatase 114     ALT 14     Anion Gap 9     AST 24     BANDS 5.0     Basophilic Stippling Occasional     Basophil% 0.0     Total Bilirubin 0.5  Comment:  For infants and newborns, interpretation of results should be based  on gestational age, weight and in agreement with clinical  observations.  Premature Infant recommended reference ranges:  Up to 24 hours.............<8.0 mg/dL  Up to 48 hours............<12.0 mg/dL  3-5 days..................<15.0 mg/dL  6-29 days.................<15.0 mg/dL       BUN, Bld 17     Calcium 9.3     Chloride 96     CO2 38     Creatinine 0.8     Differential Method Manual     eGFR if  >60     eGFR if non  >60  Comment:  Calculation used to obtain the estimated glomerular filtration  rate (eGFR) is the CKD-EPI equation.        Eosinophil% 1.0     Glucose 94     Gran% 75.0     Hematocrit 42.2     Hemoglobin 13.2     Lymph% 10.0     Magnesium 2.0     MCH 33.3     MCHC 31.3          Mono% 9.0     MPV 9.3     Platelet Estimate Increased     Platelets 384     Poly Occasional     Potassium 3.8     Total Protein 6.5     RBC 3.96     RDW 14.3     Sodium 143     Stomatocytes Present     Target Cells Occasional     Tear Drop Cells Occasional     WBC 18.22           Significant Imaging:   Imaging Results          X-Ray Chest AP Portable (Final result)  Result time 03/06/19 12:15:59    Final result by OLEGARIO Sotelo Sr., MD (03/06/19 12:15:59)                 Impression:      1. The size of the heart is prominent. There has been interval development of a mild amount of interstitial opacities seen in both lungs with Kerley B-lines visualized bilaterally.  This is characteristic of pulmonary edema.  2. The left costophrenic angle is opacified.  This is characteristic of a tiny pleural effusion.  .      Electronically signed  by: Daniel Sotelo MD  Date:    03/06/2019  Time:    12:15             Narrative:    EXAMINATION:  XR CHEST AP PORTABLE    CLINICAL HISTORY:  Sepsis;    COMPARISON:  12/17/2018    FINDINGS:  The size of the heart is prominent.  There has been interval development of a mild amount of interstitial opacities seen in both lungs with Kerley B-lines visualized bilaterally.  There is no pneumothorax.  The right costophrenic angle sharp.  The left costophrenic angle is opacified.

## 2019-03-09 NOTE — PROGRESS NOTES
[facility] Hospital Medicine  Progress Note    Patient Name: Ashley Koenig  MRN: [id number]  Patient Class: IP- Inpatient   Admission Date: 3/6/2019  Length of Stay: 3 days  Attending Physician: Darling Mcgovern MD  Primary Care Provider: Maggy Ngo MD        Subjective:     Principal Problem:Aspiration pneumonia    HPI:  Ms. Koenig is a 89 yo fairly healthy white female who presented to the ED with her son complaining of SOB associated with a cough. Per the son this has been ongoing for about 1 week. Upon presentation she was found to be in some respiratory distress and hypoxia with oxygen saturation at 86%. She also was found down by family members but denies any LOC.     Chest Xray shows prominent size heart with interstitial opacities of both lungs with Carlee B-lines. Per the patient and her son there is no known history of CHF but he does give a history of PE which from history doesn't sound like it was provoked.    Hospital Course:  3/7/19: elderly female admitted yesterday with hypoxia and was thought to have decompensated diastolic dysfunction found this am very lethargic and ABG showed hypercapnia with Pco2 of 76. CTA ruled out PE but showed bilateral consolidation lungs    3/8/19: patient transferred to ICU yesterday and placed on Bipap but much more alert today. She was made DNR by her son. CXR is improved with some mild haziness of both lungs and small pleural effusion of left. Her WBC is trending down to 16K.    3/9/19: patient is doing much better and off BiPAP on nasal cannula and has been downgraded to tele but no beds. Her WBC back up today and abx changed to give better anaerobic coverage as she is aspirating and appreciate ST.        Interval History: 89 yo female with dementia and hearing loss admitted with Respiratory failure secondary to aspiration PNA. Clinically she is better.    Review of Systems   Constitutional: Negative.    HENT: Negative.    Eyes: Negative.     Respiratory: Positive for cough.    Cardiovascular: Negative.    Gastrointestinal: Negative.    Endocrine: Negative.    Genitourinary: Negative.    Musculoskeletal: Negative.    Skin: Negative.    Allergic/Immunologic: Negative.    Neurological: Negative.    Hematological: Negative.    Psychiatric/Behavioral: Negative.      Objective:     Vital Signs (Most Recent):  Temp: 98.3 °F (36.8 °C) (03/09/19 0730)  Pulse: 65 (03/09/19 0743)  Resp: (!) 23 (03/09/19 0743)  BP: (!) 148/60 (03/09/19 0730)  SpO2: 96 % (03/09/19 0743) Vital Signs (24h Range):  Temp:  [98.3 °F (36.8 °C)-99.2 °F (37.3 °C)] 98.3 °F (36.8 °C)  Pulse:  [57-81] 65  Resp:  [15-30] 23  SpO2:  [92 %-98 %] 96 %  BP: (113-148)/(41-97) 148/60     Weight: 81.1 kg (178 lb 12.7 oz)  Body mass index is 28.06 kg/m².    Intake/Output Summary (Last 24 hours) at 3/9/2019 0808  Last data filed at 3/9/2019 0600  Gross per 24 hour   Intake 420 ml   Output 700 ml   Net -280 ml      Physical Exam   Constitutional: She appears well-developed.   Sickly appearing   HENT:   Head: Normocephalic and atraumatic.   Right Ear: External ear normal.   Left Ear: External ear normal.   Nose: Nose normal.   Mouth/Throat: Oropharynx is clear and moist.   Eyes: EOM are normal. Pupils are equal, round, and reactive to light.   Neck: Normal range of motion. Neck supple.   Cardiovascular: Normal rate, regular rhythm and intact distal pulses.   Distant heart sounds   Pulmonary/Chest: Effort normal.   Slightly decreased at bases   Abdominal: Soft. Bowel sounds are normal.   Genitourinary: Vagina normal.   Neurological: She is alert.   Skin: Skin is warm and dry. Capillary refill takes less than 2 seconds.   Psychiatric: She has a normal mood and affect.   Nursing note and vitals reviewed.      Significant Labs:   Recent Lab Results       03/09/19  0405        Albumin 2.4     Alkaline Phosphatase 114     ALT 14     Anion Gap 9     AST 24     BANDS 5.0     Basophilic Stippling Occasional      Basophil% 0.0     Total Bilirubin 0.5  Comment:  For infants and newborns, interpretation of results should be based  on gestational age, weight and in agreement with clinical  observations.  Premature Infant recommended reference ranges:  Up to 24 hours.............<8.0 mg/dL  Up to 48 hours............<12.0 mg/dL  3-5 days..................<15.0 mg/dL  6-29 days.................<15.0 mg/dL       BUN, Bld 17     Calcium 9.3     Chloride 96     CO2 38     Creatinine 0.8     Differential Method Manual     eGFR if  >60     eGFR if non  >60  Comment:  Calculation used to obtain the estimated glomerular filtration  rate (eGFR) is the CKD-EPI equation.        Eosinophil% 1.0     Glucose 94     Gran% 75.0     Hematocrit 42.2     Hemoglobin 13.2     Lymph% 10.0     Magnesium 2.0     MCH 33.3     MCHC 31.3          Mono% 9.0     MPV 9.3     Platelet Estimate Increased     Platelets 384     Poly Occasional     Potassium 3.8     Total Protein 6.5     RBC 3.96     RDW 14.3     Sodium 143     Stomatocytes Present     Target Cells Occasional     Tear Drop Cells Occasional     WBC 18.22           Significant Imaging:   Imaging Results          X-Ray Chest AP Portable (Final result)  Result time 03/06/19 12:15:59    Final result by OLEGARIO Sotelo Sr., MD (03/06/19 12:15:59)                 Impression:      1. The size of the heart is prominent. There has been interval development of a mild amount of interstitial opacities seen in both lungs with Kerley B-lines visualized bilaterally.  This is characteristic of pulmonary edema.  2. The left costophrenic angle is opacified.  This is characteristic of a tiny pleural effusion.  .      Electronically signed by: Daniel Sotelo MD  Date:    03/06/2019  Time:    12:15             Narrative:    EXAMINATION:  XR CHEST AP PORTABLE    CLINICAL HISTORY:  Sepsis;    COMPARISON:  12/17/2018    FINDINGS:  The size of the heart is prominent.  There has  been interval development of a mild amount of interstitial opacities seen in both lungs with Kerley B-lines visualized bilaterally.  There is no pneumothorax.  The right costophrenic angle sharp.  The left costophrenic angle is opacified.                                  Assessment/Plan:      * Aspiration pneumonia    She presented with elevated WBC, HR, and hypoxia with no obvious source of infection only asymptomatic bacteruria which can be cause of her Leukocytosis but underlying PNA not completely excluded so will cover with Rocephin/Doxycycline    3/7/19: it appears she has underlying bilateral PNA  Continue current ans and f/u blood cultures    3/8/19: WBC trending down and cultures are negative  Resume current Abx    3/9/19: agree with switching antibiotics as her WBC increased today  BC's negative  Continue with current tx       Acute hypoxemic respiratory failure    Given her presentation she has been given supplemental oxygen with sats up to 93%  I and concerned she could possible have another PE as she had in 2014 which appears to have been unprovoked and at the time recommended long term anticoagulation.    3/7/19:  Resolved as she is hypercapnic and after reviewing previous records there is a history of COPD.  It appears she has bilateral PNA will consolidations seen on CTA and no PE.  -transfer to ICU and place on Bipap with repeat ABG in about 1 hour  -consult Pulmonology     3/8/19: still on BiPAP but will attempt to wean as she is now DNR and may not tolerate being off as discussion will be made with her son today. This is secondary to underlying PNA with possible underlying aspiration    3/9/19: still doing well off BiPAP and now on nasal cannula, monitor closely as she is a CO2 retainer.       Acute on chronic diastolic congestive heart failure    She appears to be in acute failure but unclear if systolic/diastolic or combined dysfunction.  Will check 2D echo, she has been given lasix and from her  history no known risk factors other than possible PNA vs PE  F/u echo and obtain stat CTA and given she is frail and has been given lasix will closely monitor her renal function as didn't want to obtain contrast but must given her history, empiric abx given she has an elevated WBC of 22K.    3/7/19: doubt if all CHF as she has diastolic dysfunction and my be mildly decompensated because of PNA. She was given IV lasix 20 mg in the ED yesterday afternoon and has but out about 600 cc of urine since last night. Her Troponin was slightly elevated but has remained stable on serial and no history of chest pain given so I suspect this was some mild demand ischemia from PNA. Echo eith normal LVF 60-65, Pulmonary HTN and diastolic dysfunction.    3/8/19: she did get a once dose of lasix on presentation but I feel she is clinically compensated     Acute cystitis without hematuria    She denies any symptoms but given her elevated WBC and UA cannot exclude as I feel her vitals and presentation related more to Respiratory.       Anxiety    Will resume her Xanax PRN         VTE Risk Mitigation (From admission, onward)        Ordered     enoxaparin injection 40 mg  Daily      03/06/19 1358     IP VTE HIGH RISK PATIENT  Once      03/06/19 1530     Place sequential compression device  Until discontinued      03/06/19 1530          Critical care time spent on the evaluation and treatment of severe organ dysfunction, review of pertinent labs and imaging studies, discussions with consulting providers and discussions with patient/family: 30 minutes.    Darling Mcgovern MD  Department of Hospital Medicine   [facility]

## 2019-03-09 NOTE — PLAN OF CARE
Problem: Occupational Therapy Goal  Goal: Occupational Therapy Goal  Outcome: Ongoing (interventions implemented as appropriate)  1.  PT WILL (S) WITH ALL LEVELS OF BED MOBILITY.  2.  PT WILL DON/DOFF SOCKS WITH MIN (A).  3.  PT WILL BE MIN (A) WITH EOB <> BSC T/F.  4.  PT WILL PERFORM X1SET X15-20 REPS OF RESISTANCE THERE EX TO INCREASE FX STRENGTH/ ENDURANCE.

## 2019-03-09 NOTE — ASSESSMENT & PLAN NOTE
She presented with elevated WBC, HR, and hypoxia with no obvious source of infection only asymptomatic bacteruria which can be cause of her Leukocytosis but underlying PNA not completely excluded so will cover with Rocephin/Doxycycline    3/7/19: it appears she has underlying bilateral PNA  Continue current ans and f/u blood cultures    3/8/19: WBC trending down and cultures are negative  Resume current Abx    3/9/19: agree with switching antibiotics as her WBC increased today  BC's negative  Continue with current tx

## 2019-03-09 NOTE — ASSESSMENT & PLAN NOTE
Given her presentation she has been given supplemental oxygen with sats up to 93%  I and concerned she could possible have another PE as she had in 2014 which appears to have been unprovoked and at the time recommended long term anticoagulation.    3/7/19:  Resolved as she is hypercapnic and after reviewing previous records there is a history of COPD.  It appears she has bilateral PNA will consolidations seen on CTA and no PE.  -transfer to ICU and place on Bipap with repeat ABG in about 1 hour  -consult Pulmonology     3/8/19: still on BiPAP but will attempt to wean as she is now DNR and may not tolerate being off as discussion will be made with her son today. This is secondary to underlying PNA with possible underlying aspiration    3/9/19: still doing well off BiPAP and now on nasal cannula, monitor closely as she is a CO2 retainer.

## 2019-03-09 NOTE — PT/OT/SLP EVAL
Speech Language Pathology Evaluation  Bedside Swallow    Patient Name:  Ashley Koenig   MRN:  [id number]  Admitting Diagnosis: Aspiration pneumonia    Recommendations:                 General Recommendations:  Dysphagia therapy  Diet recommendations:  Puree, Nectar Thick   Aspiration Precautions: 1 bite/sip at a time, Alternating bites/sips, Assistance with meals and Assistance with thickening liquids, Avoid talking while eating, Check for pocketing/oral residue, Eliminate distractions, Feed only when awake/alert, Frequent oral care, HOB to 90 degrees, Monitor for s/s of aspiration, Small bites/sips and Strict aspiration precautions   General Precautions: Standard, aspiration  Communication strategies:  none    History:     Past Medical History:   Diagnosis Date    Acute congestive heart failure 3/6/2019    Age-related macular degeneration, wet, both eyes     Anginal equivalent     Anxiety     Arthritis     BPPV (benign paroxysmal positional vertigo)     Brain tumor     Clotting disorder     lung    COPD (chronic obstructive pulmonary disease)     Depression     Diastolic dysfunction     Hearing loss     Heart disease     Hyperlipidemia     Hypertension     Hypoxemia     Lung nodule     Meningioma     Multiple thyroid nodules     Pulmonary embolism     Pulmonary hypertension     Sepsis due to pneumonia     Sleep apnea in adult     Stroke     per eye exam, never been treated for    Urinary incontinence     Vitamin D deficiency        Past Surgical History:   Procedure Laterality Date    CATARACT EXTRACTION Bilateral     TOTAL HIP ARTHROPLASTY Right 08/20/2016    aafter fx       Social History: Patient lives with family.    Prior Intubation HX:      Modified Barium Swallow:     Chest X-Rays:     Prior diet: NPO    Occupation/hobbies/homemaking: retired    Subjective     Pt alert and pleasant, seen at bedside. Pt's family was present during ST evaluation.     Patient goals: Per family, to be  able to eat.     Pain/Comfort:  · Pain Rating 1: 0/10    Objective:     Oral Musculature Evaluation  · Oral Musculature: general weakness  · Dentition: present and adequate  · Mucosal Quality: adequate  · Mandibular Strength and Mobility: WFL  · Lingual Strength and Mobility: functional strength  · Buccal Strength and Mobility: WFL  · Volitional Cough: productive   · Volitional Swallow:  delayed initiation     Bedside Swallow Eval:   Consistencies Assessed:  · Thin liquids water via cup rim, straw  · Nectar thick liquids apple juice via cup rim  · Soft solids diced pears via teaspoon  · Solids baked fish, white beans via teaspoon     Oral Phase:   · Excess chewing  · Prolonged mastication  · Oral residue  · Slow oral transit time    Pharyngeal Phase:   · delayed swallow initation  · multiple spontaneous swallows    Compensatory Strategies  · Chin tuck  · Finger sweep  · Multiple swallows    Treatment: ST completed bedside swallow evaluation to assess oropharyngeal swallow function and recommended diet tolerance.     Assessment:     Ashley Koenig is a 89 y.o. female with an SLP diagnosis of Dysphagia.  She presents with oropharyngeal dysphagia and an increased risk of aspiration.    Goals:   Multidisciplinary Problems     SLP Goals        Problem: SLP Goal    Goal Priority Disciplines Outcome   SLP Goal     SLP    Description:  1. Pt to tolerate a pureed diet and nectar thickened liquids with use of swallowing strategies given min A via verbal cues.  2. Pt to tolerate upgraded diet trials with no overt s/s of aspiration.   3. Pt will complete oral motor and pharyngeal swallow exercises x 10 repetitions each to improve oropharyngeal swallow function.                     Plan:     · Patient to be seen:  3 x/week   · Plan of Care expires:  03/14/19  · Plan of Care reviewed with:  family   · SLP Follow-Up:  Yes       Discharge recommendations:  other (see comments)   Barriers to Discharge:  Safety Awareness max  A    Time Tracking:     SLP Treatment Date:   03/08/19  Speech Start Time:  1330  Speech Stop Time:  1350     Speech Total Time (min):  20 min    Billable Minutes: Eval Swallow and Oral Function 20    Karina Lindquist CCC-SLP  03/08/2019

## 2019-03-10 LAB
ALBUMIN SERPL BCP-MCNC: 2.3 G/DL
ALP SERPL-CCNC: 99 U/L
ALT SERPL W/O P-5'-P-CCNC: 11 U/L
ANION GAP SERPL CALC-SCNC: 10 MMOL/L
AST SERPL-CCNC: 22 U/L
BASOPHILS NFR BLD: 0 %
BILIRUB SERPL-MCNC: 0.4 MG/DL
BUN SERPL-MCNC: 18 MG/DL
CALCIUM SERPL-MCNC: 9.3 MG/DL
CHLORIDE SERPL-SCNC: 96 MMOL/L
CO2 SERPL-SCNC: 37 MMOL/L
CREAT SERPL-MCNC: 0.8 MG/DL
DIFFERENTIAL METHOD: ABNORMAL
EOSINOPHIL NFR BLD: 1 %
ERYTHROCYTE [DISTWIDTH] IN BLOOD BY AUTOMATED COUNT: 14.3 %
EST. GFR  (AFRICAN AMERICAN): >60 ML/MIN/1.73 M^2
EST. GFR  (NON AFRICAN AMERICAN): >60 ML/MIN/1.73 M^2
GLUCOSE SERPL-MCNC: 98 MG/DL
HCT VFR BLD AUTO: 39.4 %
HGB BLD-MCNC: 12.2 G/DL
LYMPHOCYTES NFR BLD: 6 %
MAGNESIUM SERPL-MCNC: 2 MG/DL
MCH RBC QN AUTO: 33 PG
MCHC RBC AUTO-ENTMCNC: 31 G/DL
MCV RBC AUTO: 107 FL
METAMYELOCYTES NFR BLD MANUAL: 1 %
MONOCYTES NFR BLD: 8 %
MYELOCYTES NFR BLD MANUAL: 1 %
NEUTROPHILS NFR BLD: 83 %
PLATELET # BLD AUTO: 352 K/UL
PLATELET BLD QL SMEAR: ABNORMAL
PMV BLD AUTO: 9 FL
POCT GLUCOSE: 142 MG/DL (ref 70–110)
POIKILOCYTOSIS BLD QL SMEAR: SLIGHT
POLYCHROMASIA BLD QL SMEAR: ABNORMAL
POTASSIUM SERPL-SCNC: 3.9 MMOL/L
PROT SERPL-MCNC: 5.8 G/DL
RBC # BLD AUTO: 3.7 M/UL
SODIUM SERPL-SCNC: 143 MMOL/L
STOMATOCYTES BLD QL SMEAR: PRESENT
WBC # BLD AUTO: 12.65 K/UL

## 2019-03-10 PROCEDURE — 94761 N-INVAS EAR/PLS OXIMETRY MLT: CPT

## 2019-03-10 PROCEDURE — 99233 SBSQ HOSP IP/OBS HIGH 50: CPT | Mod: ,,, | Performed by: INTERNAL MEDICINE

## 2019-03-10 PROCEDURE — 63600175 PHARM REV CODE 636 W HCPCS: Performed by: NURSE PRACTITIONER

## 2019-03-10 PROCEDURE — A4216 STERILE WATER/SALINE, 10 ML: HCPCS | Performed by: FAMILY MEDICINE

## 2019-03-10 PROCEDURE — 92526 ORAL FUNCTION THERAPY: CPT

## 2019-03-10 PROCEDURE — 80053 COMPREHEN METABOLIC PANEL: CPT

## 2019-03-10 PROCEDURE — 83735 ASSAY OF MAGNESIUM: CPT

## 2019-03-10 PROCEDURE — 21400001 HC TELEMETRY ROOM

## 2019-03-10 PROCEDURE — 97110 THERAPEUTIC EXERCISES: CPT

## 2019-03-10 PROCEDURE — 25000003 PHARM REV CODE 250: Performed by: FAMILY MEDICINE

## 2019-03-10 PROCEDURE — 97530 THERAPEUTIC ACTIVITIES: CPT

## 2019-03-10 PROCEDURE — 99233 PR SUBSEQUENT HOSPITAL CARE,LEVL III: ICD-10-PCS | Mod: ,,, | Performed by: INTERNAL MEDICINE

## 2019-03-10 PROCEDURE — 27000221 HC OXYGEN, UP TO 24 HOURS

## 2019-03-10 PROCEDURE — 25000242 PHARM REV CODE 250 ALT 637 W/ HCPCS: Performed by: FAMILY MEDICINE

## 2019-03-10 PROCEDURE — 97162 PT EVAL MOD COMPLEX 30 MIN: CPT

## 2019-03-10 PROCEDURE — 85007 BL SMEAR W/DIFF WBC COUNT: CPT

## 2019-03-10 PROCEDURE — 85027 COMPLETE CBC AUTOMATED: CPT

## 2019-03-10 PROCEDURE — 36415 COLL VENOUS BLD VENIPUNCTURE: CPT

## 2019-03-10 PROCEDURE — 25000003 PHARM REV CODE 250: Performed by: NURSE PRACTITIONER

## 2019-03-10 PROCEDURE — 63600175 PHARM REV CODE 636 W HCPCS: Performed by: FAMILY MEDICINE

## 2019-03-10 PROCEDURE — 94640 AIRWAY INHALATION TREATMENT: CPT

## 2019-03-10 RX ADMIN — PIPERACILLIN AND TAZOBACTAM 4.5 G: 4; .5 INJECTION, POWDER, LYOPHILIZED, FOR SOLUTION INTRAVENOUS; PARENTERAL at 10:03

## 2019-03-10 RX ADMIN — ASPIRIN 81 MG: 81 TABLET, COATED ORAL at 10:03

## 2019-03-10 RX ADMIN — POLYETHYLENE GLYCOL 3350 17 G: 17 POWDER, FOR SOLUTION ORAL at 10:03

## 2019-03-10 RX ADMIN — FAMOTIDINE 20 MG: 20 TABLET, FILM COATED ORAL at 10:03

## 2019-03-10 RX ADMIN — ACETAMINOPHEN 650 MG: 325 TABLET ORAL at 08:03

## 2019-03-10 RX ADMIN — PIPERACILLIN AND TAZOBACTAM 4.5 G: 4; .5 INJECTION, POWDER, LYOPHILIZED, FOR SOLUTION INTRAVENOUS; PARENTERAL at 07:03

## 2019-03-10 RX ADMIN — IPRATROPIUM BROMIDE AND ALBUTEROL SULFATE 3 ML: .5; 3 SOLUTION RESPIRATORY (INHALATION) at 01:03

## 2019-03-10 RX ADMIN — Medication 3 ML: at 04:03

## 2019-03-10 RX ADMIN — LOPERAMIDE HYDROCHLORIDE 2 MG: 2 CAPSULE ORAL at 08:03

## 2019-03-10 RX ADMIN — PIPERACILLIN AND TAZOBACTAM 4.5 G: 4; .5 INJECTION, POWDER, LYOPHILIZED, FOR SOLUTION INTRAVENOUS; PARENTERAL at 03:03

## 2019-03-10 RX ADMIN — Medication 100 MG: at 08:03

## 2019-03-10 RX ADMIN — ESCITALOPRAM OXALATE 20 MG: 10 TABLET, FILM COATED ORAL at 10:03

## 2019-03-10 RX ADMIN — IPRATROPIUM BROMIDE AND ALBUTEROL SULFATE 3 ML: .5; 3 SOLUTION RESPIRATORY (INHALATION) at 08:03

## 2019-03-10 RX ADMIN — ENOXAPARIN SODIUM 40 MG: 100 INJECTION SUBCUTANEOUS at 04:03

## 2019-03-10 RX ADMIN — IPRATROPIUM BROMIDE AND ALBUTEROL SULFATE 3 ML: .5; 3 SOLUTION RESPIRATORY (INHALATION) at 07:03

## 2019-03-10 NOTE — PROGRESS NOTES
[facility] Hospital Medicine  Progress Note    Patient Name: Ashley Koenig  MRN: [id number]  Patient Class: IP- Inpatient   Admission Date: 3/6/2019  Length of Stay: 4 days  Attending Physician: Darling Mcgovern MD  Primary Care Provider: Maggy Ngo MD        Subjective:     Principal Problem:Aspiration pneumonia    HPI:  Ms. Koenig is a 89 yo fairly healthy white female who presented to the ED with her son complaining of SOB associated with a cough. Per the son this has been ongoing for about 1 week. Upon presentation she was found to be in some respiratory distress and hypoxia with oxygen saturation at 86%. She also was found down by family members but denies any LOC.     Chest Xray shows prominent size heart with interstitial opacities of both lungs with Carlee B-lines. Per the patient and her son there is no known history of CHF but he does give a history of PE which from history doesn't sound like it was provoked.    Hospital Course:  3/7/19: elderly female admitted yesterday with hypoxia and was thought to have decompensated diastolic dysfunction found this am very lethargic and ABG showed hypercapnia with Pco2 of 76. CTA ruled out PE but showed bilateral consolidation lungs    3/8/19: patient transferred to ICU yesterday and placed on Bipap but much more alert today. She was made DNR by her son. CXR is improved with some mild haziness of both lungs and small pleural effusion of left. Her WBC is trending down to 16K.    3/9/19: patient is doing much better and off BiPAP on nasal cannula and has been downgraded to tele but no beds. Her WBC back up today and abx changed to give better anaerobic coverage as she is aspirating and appreciate ST.    3/10/19: still with coughing her WBC now normal and clinically she looks better.    Interval History: 89 yo female admitted with respiratory failure secondary aspiration PNA. Overall she is improving.    Review of Systems   Constitutional:  Negative.    HENT: Negative.    Respiratory: Positive for cough and shortness of breath.    Endocrine: Negative.    Genitourinary: Negative.    Musculoskeletal: Negative.    Skin: Negative.    Allergic/Immunologic: Negative.    Neurological: Negative.    Hematological: Negative.      Objective:     Vital Signs (Most Recent):  Temp: 98 °F (36.7 °C) (03/10/19 1158)  Pulse: 82 (03/10/19 1332)  Resp: 18 (03/10/19 1332)  BP: (!) 148/64 (03/10/19 1158)  SpO2: 95 % (03/10/19 1332) Vital Signs (24h Range):  Temp:  [97.8 °F (36.6 °C)-98.8 °F (37.1 °C)] 98 °F (36.7 °C)  Pulse:  [67-86] 82  Resp:  [18-30] 18  SpO2:  [92 %-95 %] 95 %  BP: (114-152)/(58-68) 148/64     Weight: 78.1 kg (172 lb 2.9 oz)  Body mass index is 27.02 kg/m².    Intake/Output Summary (Last 24 hours) at 3/10/2019 1608  Last data filed at 3/10/2019 0700  Gross per 24 hour   Intake --   Output 275 ml   Net -275 ml      Physical Exam   Constitutional: She appears well-developed. She has a sickly appearance.   HENT:   Head: Normocephalic and atraumatic.   Right Ear: External ear normal.   Left Ear: External ear normal.   Mouth/Throat: Oropharynx is clear and moist.   Eyes: EOM are normal. Pupils are equal, round, and reactive to light.   Neck: Normal range of motion. Neck supple.   Cardiovascular: Normal rate, regular rhythm, normal heart sounds and intact distal pulses.   Pulmonary/Chest: Effort normal. She has wheezes. She has rales.   Abdominal: Soft. Bowel sounds are normal.   Musculoskeletal: Normal range of motion.   Neurological: She is alert.   Skin: Skin is warm and dry.   Psychiatric: She has a normal mood and affect.   Nursing note and vitals reviewed.      Significant Labs:   Recent Lab Results       03/10/19  0453        Albumin 2.3     Alkaline Phosphatase 99     ALT 11     Anion Gap 10     AST 22     Basophil% 0.0     Total Bilirubin 0.4  Comment:  For infants and newborns, interpretation of results should be based  on gestational age, weight and  in agreement with clinical  observations.  Premature Infant recommended reference ranges:  Up to 24 hours.............<8.0 mg/dL  Up to 48 hours............<12.0 mg/dL  3-5 days..................<15.0 mg/dL  6-29 days.................<15.0 mg/dL       BUN, Bld 18     Calcium 9.3     Chloride 96     CO2 37     Creatinine 0.8     Differential Method Manual     eGFR if  >60     eGFR if non  >60  Comment:  Calculation used to obtain the estimated glomerular filtration  rate (eGFR) is the CKD-EPI equation.        Eosinophil% 1.0     Glucose 98     Gran% 83.0     Hematocrit 39.4     Hemoglobin 12.2     Lymph% 6.0     Magnesium 2.0     MCH 33.0     MCHC 31.0          Metamyelocytes 1.0     Mono% 8.0     MPV 9.0     Myelocytes 1.0     Platelet Estimate Appears normal     Platelets 352     Poik Slight     Poly Occasional     Potassium 3.9     Total Protein 5.8     RBC 3.70     RDW 14.3     Sodium 143     Stomatocytes Present     WBC 12.65           Significant Imaging:   Imaging Results          X-Ray Chest AP Portable (Final result)  Result time 03/06/19 12:15:59    Final result by OLEGARIO Sotelo Sr., MD (03/06/19 12:15:59)                 Impression:      1. The size of the heart is prominent. There has been interval development of a mild amount of interstitial opacities seen in both lungs with Kerley B-lines visualized bilaterally.  This is characteristic of pulmonary edema.  2. The left costophrenic angle is opacified.  This is characteristic of a tiny pleural effusion.  .      Electronically signed by: Daniel Sotelo MD  Date:    03/06/2019  Time:    12:15             Narrative:    EXAMINATION:  XR CHEST AP PORTABLE    CLINICAL HISTORY:  Sepsis;    COMPARISON:  12/17/2018    FINDINGS:  The size of the heart is prominent.  There has been interval development of a mild amount of interstitial opacities seen in both lungs with Kerley B-lines visualized bilaterally.  There is no  pneumothorax.  The right costophrenic angle sharp.  The left costophrenic angle is opacified.                                  Assessment/Plan:      * Aspiration pneumonia    She presented with elevated WBC, HR, and hypoxia with no obvious source of infection only asymptomatic bacteruria which can be cause of her Leukocytosis but underlying PNA not completely excluded so will cover with Rocephin/Doxycycline    3/7/19: it appears she has underlying bilateral PNA  Continue current ans and f/u blood cultures    3/8/19: WBC trending down and cultures are negative  Resume current Abx    3/10/19: slowly improving as her WBC normal and diet has been adjusted  Continue abx    3/9/19: agree with switching antibiotics as her WBC increased today  BC's negative  Continue with current tx       Acute hypoxemic respiratory failure    Given her presentation she has been given supplemental oxygen with sats up to 93%  I and concerned she could possible have another PE as she had in 2014 which appears to have been unprovoked and at the time recommended long term anticoagulation.    3/7/19:  Resolved as she is hypercapnic and after reviewing previous records there is a history of COPD.  It appears she has bilateral PNA will consolidations seen on CTA and no PE.  -transfer to ICU and place on Bipap with repeat ABG in about 1 hour  -consult Pulmonology     3/8/19: still on BiPAP but will attempt to wean as she is now DNR and may not tolerate being off as discussion will be made with her son today. This is secondary to underlying PNA with possible underlying aspiration    3/9/19: still doing well off BiPAP and now on nasal cannula, monitor closely as she is a CO2 retainer.    3/10/19: on nasal cannula stable        Acute on chronic diastolic congestive heart failure    She appears to be in acute failure but unclear if systolic/diastolic or combined dysfunction.  Will check 2D echo, she has been given lasix and from her history no known  risk factors other than possible PNA vs PE  F/u echo and obtain stat CTA and given she is frail and has been given lasix will closely monitor her renal function as didn't want to obtain contrast but must given her history, empiric abx given she has an elevated WBC of 22K.    3/7/19: doubt if all CHF as she has diastolic dysfunction and my be mildly decompensated because of PNA. She was given IV lasix 20 mg in the ED yesterday afternoon and has but out about 600 cc of urine since last night. Her Troponin was slightly elevated but has remained stable on serial and no history of chest pain given so I suspect this was some mild demand ischemia from PNA. Echo eith normal LVF 60-65, Pulmonary HTN and diastolic dysfunction.    3/8/19: she did get a once dose of lasix on presentation but I feel she is clinically compensated     Acute cystitis without hematuria    She denies any symptoms but given her elevated WBC and UA cannot exclude as I feel her vitals and presentation related more to Respiratory.       Anxiety    Will resume her Xanax PRN         VTE Risk Mitigation (From admission, onward)        Ordered     enoxaparin injection 40 mg  Daily      03/06/19 1358     IP VTE HIGH RISK PATIENT  Once      03/06/19 1530     Place sequential compression device  Until discontinued      03/06/19 1530              Darling Mcgovern MD  Department of Hospital Medicine   [facility]

## 2019-03-10 NOTE — ASSESSMENT & PLAN NOTE
ST evaluation => oropharyngeal dysphagia  Change to pureed diet with nectar thick liquids diet per ST recs  Aspiration precautions  Expand antibiotic coverage to cover ASPIRATION PNEUMONIA

## 2019-03-10 NOTE — SUBJECTIVE & OBJECTIVE
Interval History: 89 yo female admitted with respiratory failure secondary aspiration PNA. Overall she is improving.    Review of Systems   Constitutional: Negative.    HENT: Negative.    Respiratory: Positive for cough and shortness of breath.    Endocrine: Negative.    Genitourinary: Negative.    Musculoskeletal: Negative.    Skin: Negative.    Allergic/Immunologic: Negative.    Neurological: Negative.    Hematological: Negative.      Objective:     Vital Signs (Most Recent):  Temp: 98 °F (36.7 °C) (03/10/19 1158)  Pulse: 82 (03/10/19 1332)  Resp: 18 (03/10/19 1332)  BP: (!) 148/64 (03/10/19 1158)  SpO2: 95 % (03/10/19 1332) Vital Signs (24h Range):  Temp:  [97.8 °F (36.6 °C)-98.8 °F (37.1 °C)] 98 °F (36.7 °C)  Pulse:  [67-86] 82  Resp:  [18-30] 18  SpO2:  [92 %-95 %] 95 %  BP: (114-152)/(58-68) 148/64     Weight: 78.1 kg (172 lb 2.9 oz)  Body mass index is 27.02 kg/m².    Intake/Output Summary (Last 24 hours) at 3/10/2019 1608  Last data filed at 3/10/2019 0700  Gross per 24 hour   Intake --   Output 275 ml   Net -275 ml      Physical Exam   Constitutional: She appears well-developed. She has a sickly appearance.   HENT:   Head: Normocephalic and atraumatic.   Right Ear: External ear normal.   Left Ear: External ear normal.   Mouth/Throat: Oropharynx is clear and moist.   Eyes: EOM are normal. Pupils are equal, round, and reactive to light.   Neck: Normal range of motion. Neck supple.   Cardiovascular: Normal rate, regular rhythm, normal heart sounds and intact distal pulses.   Pulmonary/Chest: Effort normal. She has wheezes. She has rales.   Abdominal: Soft. Bowel sounds are normal.   Musculoskeletal: Normal range of motion.   Neurological: She is alert.   Skin: Skin is warm and dry.   Psychiatric: She has a normal mood and affect.   Nursing note and vitals reviewed.      Significant Labs:   Recent Lab Results       03/10/19  0453        Albumin 2.3     Alkaline Phosphatase 99     ALT 11     Anion Gap 10     AST  22     Basophil% 0.0     Total Bilirubin 0.4  Comment:  For infants and newborns, interpretation of results should be based  on gestational age, weight and in agreement with clinical  observations.  Premature Infant recommended reference ranges:  Up to 24 hours.............<8.0 mg/dL  Up to 48 hours............<12.0 mg/dL  3-5 days..................<15.0 mg/dL  6-29 days.................<15.0 mg/dL       BUN, Bld 18     Calcium 9.3     Chloride 96     CO2 37     Creatinine 0.8     Differential Method Manual     eGFR if  >60     eGFR if non  >60  Comment:  Calculation used to obtain the estimated glomerular filtration  rate (eGFR) is the CKD-EPI equation.        Eosinophil% 1.0     Glucose 98     Gran% 83.0     Hematocrit 39.4     Hemoglobin 12.2     Lymph% 6.0     Magnesium 2.0     MCH 33.0     MCHC 31.0          Metamyelocytes 1.0     Mono% 8.0     MPV 9.0     Myelocytes 1.0     Platelet Estimate Appears normal     Platelets 352     Poik Slight     Poly Occasional     Potassium 3.9     Total Protein 5.8     RBC 3.70     RDW 14.3     Sodium 143     Stomatocytes Present     WBC 12.65           Significant Imaging:   Imaging Results          X-Ray Chest AP Portable (Final result)  Result time 03/06/19 12:15:59    Final result by OLEGARIO Sotelo Sr., MD (03/06/19 12:15:59)                 Impression:      1. The size of the heart is prominent. There has been interval development of a mild amount of interstitial opacities seen in both lungs with Kerley B-lines visualized bilaterally.  This is characteristic of pulmonary edema.  2. The left costophrenic angle is opacified.  This is characteristic of a tiny pleural effusion.  .      Electronically signed by: Daniel Sotelo MD  Date:    03/06/2019  Time:    12:15             Narrative:    EXAMINATION:  XR CHEST AP PORTABLE    CLINICAL HISTORY:  Sepsis;    COMPARISON:  12/17/2018    FINDINGS:  The size of the heart is prominent.   There has been interval development of a mild amount of interstitial opacities seen in both lungs with Kerley B-lines visualized bilaterally.  There is no pneumothorax.  The right costophrenic angle sharp.  The left costophrenic angle is opacified.

## 2019-03-10 NOTE — ASSESSMENT & PLAN NOTE
She presented with elevated WBC, HR, and hypoxia with no obvious source of infection only asymptomatic bacteruria which can be cause of her Leukocytosis but underlying PNA not completely excluded so will cover with Rocephin/Doxycycline    3/7/19: it appears she has underlying bilateral PNA  Continue current ans and f/u blood cultures    3/8/19: WBC trending down and cultures are negative  Resume current Abx    3/10/19: slowly improving as her WBC normal and diet has been adjusted  Continue abx    3/9/19: agree with switching antibiotics as her WBC increased today  BC's negative  Continue with current tx

## 2019-03-10 NOTE — PT/OT/SLP PROGRESS
Physical Therapy  Treatment    Ashley Koenig   MRN: [id number]   Admitting Diagnosis: Aspiration pneumonia    PT Received On: 03/10/19  PT Start Time: 0839     PT Stop Time: 0902    PT Total Time (min): 23 min       Billable Minutes:  Therapeutic Activity 12 minutes and Therapeutic Exercise 11 minutes    Treatment Type: Treatment  PT/PTA: PTA     PTA Visit Number: 1       General Precautions: Standard, fall  Orthopedic Precautions: N/A   Braces:           Subjective:  Communicated with epic and nurse Sarah prior to session.      Pain/Comfort  Pain Rating 1: 0/10    Objective:   Patient found with: telemetry, peripheral IV, oxygen, SCD    Functional Mobility:  Bed Mobility: mod assist       Transfers:mod asssit       Gait: mod assist for 2-3 lateral steps toward HOB       Stairs:n/a          Balance:   Static Sit: GOOD-: Takes MODERATE challenges from all directions but inconsistently  Dynamic Sit: FAIR+: Maintains balance through MINIMAL excursions of active trunk motion  Static Stand: POOR+: Needs MINIMAL assist to maintain  Dynamic stand: POOR: Needs MOD (moderate) assist during gait     Therapeutic Activities and Exercises:  Patient with mod assist came to sitting EOB. Completed B LE therex: LAQ, hip abduction/adduction, MIP and APs. Sit to stand with mod assist and remained standing for several minutes. Took 2-3 lateral steps toward HOB with mod assist.      AM-PAC 6 CLICK MOBILITY  How much help from another person does this patient currently need?   1 = Unable, Total/Dependent Assistance  2 = A lot, Maximum/Moderate Assistance  3 = A little, Minimum/Contact Guard/Supervision  4 = None, Modified Marshall/Independent    Turning over in bed (including adjusting bedclothes, sheets and blankets)?: 3  Sitting down on and standing up from a chair with arms (e.g., wheelchair, bedside commode, etc.): 2  Moving from lying on back to sitting on the side of the bed?: 2  Moving to and from a bed to a chair (including  a wheelchair)?: 2  Need to walk in hospital room?: 1  Climbing 3-5 steps with a railing?: 1  Basic Mobility Total Score: 11    AM-PAC Raw Score CMS G-Code Modifier Level of Impairment Assistance   6 % Total / Unable   7 - 9 CM 80 - 100% Maximal Assist   10 - 14 CL 60 - 80% Moderate Assist   15 - 19 CK 40 - 60% Moderate Assist   20 - 22 CJ 20 - 40% Minimal Assist   23 CI 1-20% SBA / CGA   24 CH 0% Independent/ Mod I     Patient left supine with all lines intact, call button in reach and nursing notified.    Assessment:  Ashley Koenig is a 89 y.o. female with a medical diagnosis of Aspiration pneumonia.    Rehab identified problem list/impairments: Rehab identified problem list/impairments: weakness, impaired endurance, decreased safety awareness, impaired self care skills, impaired balance, decreased coordination    Rehab potential is fair.    Activity tolerance: Fair    Discharge recommendations: Discharge Facility/Level of Care Needs: nursing facility, skilled     Barriers to discharge:      Equipment recommendations:       GOALS:   Multidisciplinary Problems     Physical Therapy Goals        Problem: Physical Therapy Goal    Goal Priority Disciplines Outcome Goal Variances Interventions   Physical Therapy Goal     PT, PT/OT      Description:  1. Patient will perform supine to sit with mod indep  2. Patient will perform sit to stand with RW min A to facilitate t/f's  3. Patient will amb >75ft RW vs 4wrw min A                    PLAN:    Patient to be seen 5 x/week  to address the above listed problems via gait training, therapeutic activities, therapeutic exercises  Plan of Care expires: 03/16/19  Plan of Care reviewed with: patient         Bimal João, PTA  03/10/2019

## 2019-03-10 NOTE — PROGRESS NOTES
[facility]   Pulmonology  Progress Note    Patient Name: Ashley Koenig  MRN: [id number]  Admission Date: 3/6/2019  Hospital Length of Stay: 3 days  Code Status: DNR  Attending Provider: Darling Mcgovern MD  Primary Care Provider: Maggy Ngo MD   Principal Problem: Aspiration pneumonia    Subjective:     Interval History: Seen and examined at bedside. Overnights events noted. Doing much better and off BiPAP on nasal cannula. SLT evaluation => oropharyngeal dysphagia and an increased risk of aspiration. Broaden IV antibiotic coverage to cover Aspiration Pneumonia.       Review of Systems   Respiratory: Positive for cough.    All other systems reviewed and are negative.    Objective:     Vital Signs (Most Recent):  Temp: 98.4 °F (36.9 °C) (03/09/19 1413)  Pulse: 74 (03/09/19 1413)  Resp: 18 (03/09/19 1413)  BP: 128/60 (03/09/19 1413)  SpO2: (!) 93 % (03/09/19 1413) Vital Signs (24h Range):  Temp:  [98.3 °F (36.8 °C)-99.2 °F (37.3 °C)] 98.4 °F (36.9 °C)  Pulse:  [57-81] 74  Resp:  [17-32] 18  SpO2:  [93 %-96 %] 93 %  BP: (113-148)/(40-97) 128/60     Weight: 78.1 kg (172 lb 2.9 oz)  Body mass index is 27.02 kg/m².      Intake/Output Summary (Last 24 hours) at 3/9/2019 1806  Last data filed at 3/9/2019 1300  Gross per 24 hour   Intake 420 ml   Output 550 ml   Net -130 ml       Physical Exam   Constitutional: She appears well-developed and well-nourished. She is cooperative.  Non-toxic appearance. She does not have a sickly appearance. She appears ill. No distress. She is not intubated. Nasal cannula in place.   HENT:   Head: Normocephalic and atraumatic.   Mouth/Throat: Mucous membranes are dry (post wearing BiPAP).   Eyes: EOM and lids are normal. Pupils are equal, round, and reactive to light.   Neck: Trachea normal and full passive range of motion without pain. Carotid bruit is not present.   Cardiovascular: Normal rate and regular rhythm. Exam reveals distant heart sounds.   Pulses:       Radial  pulses are 2+ on the right side, and 2+ on the left side.        Dorsalis pedis pulses are 1+ on the right side, and 1+ on the left side.   Pulmonary/Chest: Effort normal. No accessory muscle usage. No tachypnea. She is not intubated. No respiratory distress. She has decreased breath sounds in the right lower field and the left lower field. She has rales.   Abdominal: Soft. Normal appearance. She exhibits no distension. Bowel sounds are decreased. There is no tenderness.   Musculoskeletal: Normal range of motion.        Right foot: There is no deformity.        Left foot: There is no deformity.   Trace LE edema   Lymphadenopathy:     She has no cervical adenopathy.   Neurological: She is alert.   Fully awake and alert and responsive with mild to mod confusion and baseline dementia   Skin: Skin is warm, dry and intact. Capillary refill takes less than 2 seconds. No rash noted. No cyanosis.   Psychiatric: She has a normal mood and affect. Her speech is normal and behavior is normal. She exhibits abnormal recent memory.       Vents:  Oxygen Concentration (%): 34 (03/09/19 0743)    Lines/Drains/Airways     Drain            Female External Urinary Catheter 03/07/19 1900 1 day          Peripheral Intravenous Line                 Peripheral IV - Single Lumen 03/08/19 1547 Anterior;Right Forearm 1 day                Significant Labs:    CBC/Anemia Profile:  Recent Labs   Lab 03/08/19  0358 03/09/19  0405   WBC 16.69* 18.22*   HGB 12.9 13.2   HCT 41.8 42.2    384*   * 107*   RDW 14.8* 14.3        Chemistries:  Recent Labs   Lab 03/08/19  0358 03/09/19  0405    143   K 3.9 3.8   CL 98 96   CO2 35* 38*   BUN 16 17   CREATININE 0.7 0.8   CALCIUM 9.3 9.3   ALBUMIN 2.3* 2.4*   PROT 6.2 6.5   BILITOT 0.3 0.5   ALKPHOS 118 114   ALT 18 14   AST 22 24   MG 1.8 2.0       ABGs:   Recent Labs   Lab 03/08/19  0417   PH 7.349*   PCO2 72.2*   HCO3 39.8*   POCSATURATED 93*   BE 14       Significant Imaging:    X-Ray  Chest 1 View 03/08/19:      1. There is a mild amount of haziness in the bases of both lungs.  This is characteristic of atelectasis or pneumonia.  2. The base of the left hemithorax is opacified.  This is characteristic of a small pleural effusion.  3. The size of the heart is prominent.  This may be secondary to magnification.        ABG  Recent Labs   Lab 03/08/19  0417   PH 7.349*   PO2 72*   PCO2 72.2*   HCO3 39.8*   BE 14     Assessment/Plan:     * Aspiration pneumonia    ST evaluation => oropharyngeal dysphagia  Change to pureed diet with nectar thick liquids diet per ST recs  Aspiration precautions  Expand antibiotic coverage to cover ASPIRATION PNEUMONIA     Acute cystitis without hematuria    Continue broad spectrum antibiotics.      Anxiety    Continue  LEXAPRO.     Acute on chronic diastolic congestive heart failure    Monitor I/Os and daily weights       Acute hypoxemic respiratory failure    Supplement oxygen.  Keep SAO2 >= 92%.  Bronchodilators per orders     Dementia    Supportive care           Ryan Johnson MD  Pulmonology  [facility]

## 2019-03-10 NOTE — PLAN OF CARE
Problem: Adult Inpatient Plan of Care  Goal: Plan of Care Review  Outcome: Ongoing (interventions implemented as appropriate)  Patient continues to be monitored and treated for acute and chronic conditions. Plan of care updated and reviewed with patient and patient's family. Patient remains free of falls this shift. Safety precautions in place, including call light in reach. Will continue to monitor and treat.

## 2019-03-10 NOTE — PT/OT/SLP EVAL
Physical Therapy Evaluation    Patient Name:  Ashley Koenig   MRN:  [id number]    Recommendations:     Discharge Recommendations:  nursing facility, skilled   Discharge Equipment Recommendations: commode   Barriers to discharge: None    Assessment:     Ashley Koenig is a 89 y.o. female admitted with a medical diagnosis of Aspiration pneumonia.  She presents with the following impairments/functional limitations:  weakness, gait instability, impaired endurance, impaired balance, impaired cardiopulmonary response to activity, impaired coordination, decreased safety awareness, impaired self care skills, impaired functional mobilty.    Rehab Prognosis: Good; patient would benefit from acute skilled PT services to address these deficits and reach maximum level of function.    Recent Surgery: * No surgery found *      Plan:     During this hospitalization, patient to be seen 5 x/week to address the identified rehab impairments via gait training, therapeutic activities, therapeutic exercises and progress toward the following goals:    · Plan of Care Expires:  03/16/19    Subjective     Chief Complaint: fatigue; shaky in standing  Patient/Family Comments/goals: to go home and get stronger  Pain/Comfort:  ·      Patients cultural, spiritual, Confucianist conflicts given the current situation:      Living Environment:  Lives with son and dtr-in-law  Prior to admission, patients level of function was mod indep with mobility.  Equipment used at home: shower chair, grab bar, rollator.  DME owned (not currently used): none.  Upon discharge, patient will have assistance from family.    Objective:     Communicated with RN prior to session.  Patient found all lines intact, call button in reach, bed alarm on and RN notified SCD, peripheral IV, oxygen, telemetry  upon PT entry to room.    General Precautions: Standard, aspiration, fall(mod [place])   Orthopedic Precautions:N/A   Braces:       Exams:  · B LE 3+ to 4-/5; ROM WFL    Functional  Mobility:  · Bed Mobility - cga  · Transfers - mod A from eob with inc cues for safe technique  · Gt - side steps to pt.'s L with inc cues for coordination/wt shifting; use of RW and mod A for balance/RW management      Therapeutic Activities and Exercises:   PT educated patient on POC, seated B LE TE x 10 reps each with PT demo, d/c recs and safety/fall precautions with mobility. Notified nurse of pt.'s mobility status and recommended BSC use for toileting.    AM-PAC 6 CLICK MOBILITY  Total Score:      Patient left HOB elevated with all lines intact, call button in reach, bed alarm on and nurse notified.    GOALS:   Multidisciplinary Problems     Physical Therapy Goals        Problem: Physical Therapy Goal    Goal Priority Disciplines Outcome Goal Variances Interventions   Physical Therapy Goal     PT, PT/OT      Description:  1. Patient will perform supine to sit with mod indep  2. Patient will perform sit to stand with RW min A to facilitate t/f's  3. Patient will amb >75ft RW vs 4wrw min A                    History:     Past Medical History:   Diagnosis Date    Acute congestive heart failure 3/6/2019    Age-related macular degeneration, wet, both eyes     Anginal equivalent     Anxiety     Arthritis     BPPV (benign paroxysmal positional vertigo)     Brain tumor     Clotting disorder     lung    COPD (chronic obstructive pulmonary disease)     Depression     Diastolic dysfunction     Hearing loss     Heart disease     Hyperlipidemia     Hypertension     Hypoxemia     Lung nodule     Meningioma     Multiple thyroid nodules     Pulmonary embolism     Pulmonary hypertension     Sepsis due to pneumonia     Sleep apnea in adult     Stroke     per eye exam, never been treated for    Urinary incontinence     Vitamin D deficiency        Past Surgical History:   Procedure Laterality Date    CATARACT EXTRACTION Bilateral     TOTAL HIP ARTHROPLASTY Right 08/20/2016    aafter fx       Time  Tracking:     PT Received On: 03/09/19  PT Start Time: 1600     PT Stop Time: 1630  PT Total Time (min): 30 min     Billable Minutes: Evaluation 15 and Therapeutic Exercise 15      Dominick Layton, EMILIA  03/10/2019

## 2019-03-10 NOTE — PT/OT/SLP PROGRESS
Speech Language Pathology Treatment    Patient Name:  Ashley Koenig   MRN:  [id number]  Admitting Diagnosis: Aspiration pneumonia    Recommendations:                 General Recommendations:  Dysphagia therapy  Diet recommendations:  Puree, Liquid Diet Level: Nectar Thick   Aspiration Precautions: 1 bite/sip at a time, Assistance with meals and Assistance with thickening liquids, Eliminate distractions, Remain upright 30 minutes post meal, Small bites/sips and Standard aspiration precautions   General Precautions: Standard, aspiration  Communication strategies:  provide increased time to answer    Subjective     Pt alert, cooperative, and seen at bedside.    Pain/Comfort:  · Pain Rating 1: 0/10    Objective:     Has the patient been evaluated by SLP for swallowing?   Yes  Keep patient NPO? No   Current Respiratory Status: nasal cannula      Bedside reassessment complete. Observed po intake of meds crushed in puree, nectar thick liquids via straw, ice chips, thin liquids via spoon, and mechanical soft side. Pt tolerated puree, nectar thick liquids, and ice chips without any overt s/s of aspiration. Pt exhibited increaed labored breathing with thin liquids and trials halted. Pt with extended oral prep, impaired a-p transit, and oral residue with mechanical soft sides. Pt required multiple cues liquid washes to clear residue from oral cavity.     Assessment:     Ashley Koenig is a 89 y.o. female with an SLP diagnosis of Dysphagia.  She presents with oropharyngeal dysphagia.    Goals:   Multidisciplinary Problems     SLP Goals        Problem: SLP Goal    Goal Priority Disciplines Outcome   SLP Goal     SLP    Description:  1. Pt to tolerate a pureed diet and nectar thickened liquids with use of swallowing strategies given min A via verbal cues.  2. Pt to tolerate upgraded diet trials with no overt s/s of aspiration.   3. Pt will complete oral motor and pharyngeal swallow exercises x 10 repetitions each to improve  oropharyngeal swallow function.                     Plan:     · Patient to be seen:  3 x/week   · Plan of Care expires:  03/14/19  · Plan of Care reviewed with:  patient   · SLP Follow-Up:  Yes       Discharge recommendations:  nursing facility, skilled   Barriers to Discharge:  Level of Skilled Assistance Needed mod    Time Tracking:     SLP Treatment Date:   03/10/19  Speech Start Time:  1023  Speech Stop Time:  1041     Speech Total Time (min):  18 min    Billable Minutes: Treatment Swallowing Dysfunction 18    Raquel Campos CCC-SLP  03/10/2019

## 2019-03-10 NOTE — ASSESSMENT & PLAN NOTE
ST evaluation => oropharyngeal dysphagia  Change to pureed diet with nectar thick liquids diet per ST recs  Aspiration precautions. Continue antibiotics.

## 2019-03-10 NOTE — PROGRESS NOTES
[facility] -   Pulmonology  Progress Note    Patient Name: Ashley Koenig  MRN: [id number]  Admission Date: 3/6/2019  Hospital Length of Stay: 4 days  Code Status: DNR  Attending Provider: Darling Mcgovern MD  Primary Care Provider: Maggy Ngo MD   Principal Problem: Aspiration pneumonia    Subjective:     Interval History: Seen and examined at bedside. Overnights events noted. Doing much better.    Review of Systems   Respiratory: Positive for cough.    All other systems reviewed and are negative.      Scheduled Meds:   albuterol-ipratropium  3 mL Nebulization Q6H WAKE    aspirin  81 mg Oral Daily    enoxaparin  40 mg Subcutaneous Daily    escitalopram oxalate  20 mg Oral Daily    famotidine  20 mg Oral Daily    niacin  100 mg Oral QHS    piperacillin-tazobactam 4.5 g in dextrose 5 % 100 mL IVPB (ready to mix system)  4.5 g Intravenous Q8H    polyethylene glycol  17 g Oral Daily    sodium chloride 0.9%  3 mL Intravenous Q8H     Continuous Infusions:  PRN Meds:.acetaminophen, bisacodyl, loperamide, ondansetron, sodium chloride 0.9%     Objective:     Vital Signs (Most Recent):  Temp: 98 °F (36.7 °C) (03/10/19 1158)  Pulse: 71 (03/10/19 1158)  Resp: 18 (03/10/19 1158)  BP: (!) 148/64 (03/10/19 1158)  SpO2: (!) 93 % (03/10/19 1158) Vital Signs (24h Range):  Temp:  [97.8 °F (36.6 °C)-98.8 °F (37.1 °C)] 98 °F (36.7 °C)  Pulse:  [67-86] 71  Resp:  [18-30] 18  SpO2:  [92 %-96 %] 93 %  BP: (114-152)/(58-68) 148/64     Weight: 78.1 kg (172 lb 2.9 oz)  Body mass index is 27.02 kg/m².      Intake/Output Summary (Last 24 hours) at 3/10/2019 1209  Last data filed at 3/10/2019 0700  Gross per 24 hour   Intake --   Output 425 ml   Net -425 ml       Physical Exam   Constitutional: She appears well-developed and well-nourished. She is cooperative.  Non-toxic appearance. She does not have a sickly appearance. She appears ill. No distress. She is not intubated. Nasal cannula in place.   HENT:   Head:  Normocephalic and atraumatic.   Mouth/Throat: Mucous membranes are dry (post wearing BiPAP).   Eyes: EOM and lids are normal. Pupils are equal, round, and reactive to light.   Neck: Trachea normal and full passive range of motion without pain. Carotid bruit is not present.   Cardiovascular: Normal rate and regular rhythm. Exam reveals distant heart sounds.   Pulses:       Radial pulses are 2+ on the right side, and 2+ on the left side.        Dorsalis pedis pulses are 1+ on the right side, and 1+ on the left side.   Pulmonary/Chest: Effort normal. No accessory muscle usage. No tachypnea. She is not intubated. No respiratory distress. She has decreased breath sounds in the right lower field and the left lower field. She has rales.   Abdominal: Soft. Normal appearance. She exhibits no distension. Bowel sounds are decreased. There is no tenderness.   Musculoskeletal: Normal range of motion.    Lymphadenopathy:   She has no cervical adenopathy.   Neurological: She is alert.   Skin: Skin is warm, dry and intact. Capillary refill takes less than 2 seconds. No rash noted. No cyanosis.   Psychiatric: She has a normal mood and affect. Her speech is normal and behavior is normal. She exhibits abnormal recent memory.       Vents:  Oxygen Concentration (%): 28 (03/10/19 0804)    Lines/Drains/Airways     Drain            Female External Urinary Catheter 03/07/19 1900 2 days          Peripheral Intravenous Line                 Peripheral IV - Single Lumen 03/08/19 1547 Anterior;Right Forearm 1 day                Significant Labs:      Laboratory Data:  Reviewed recent labs. Appear stable other than abnormalities addressed in plan.     Radiology:  Reviewed recent imaging studies. Appear stable other than abnormalities addressed in plan.       ABG  Recent Labs   Lab 03/08/19  0417   PH 7.349*   PO2 72*   PCO2 72.2*   HCO3 39.8*   BE 14     Assessment/Plan:     * Aspiration pneumonia    ST evaluation => oropharyngeal  dysphagia  Change to pureed diet with nectar thick liquids diet per ST recs  Aspiration precautions. Continue antibiotics.      Acute cystitis without hematuria    Continue broad spectrum antibiotics.      Anxiety    Continue  LEXAPRO.     Acute on chronic diastolic congestive heart failure    Monitor I/Os and daily weights       Acute hypoxemic respiratory failure    Supplement oxygen.  Keep SAO2 >= 92%.  Bronchodilators per orders     Dementia    Supportive care     Coronary artery disease involving native coronary artery of native heart without angina pectoris    Cont Niacin and add ASA  Some bradycardia overnight will not start b-blocker       No further recommendations. Will sign off for now. Please feel free to re consult if further pulmonary issues arise.       Ryan Johnson MD  Pulmonology  [facility]

## 2019-03-10 NOTE — PLAN OF CARE
Problem: Physical Therapy Goal  Goal: Physical Therapy Goal  1. Patient will perform supine to sit with mod indep  2. Patient will perform sit to stand with RW min A to facilitate t/f's  3. Patient will amb >75ft RW vs 4wrw min A   Outcome: Ongoing (interventions implemented as appropriate)  Patient with mod assist of 1 for supine <--> sit <--> stand. Able to stand for several minutes without balance disturbances prior to fatigue with min assist.

## 2019-03-10 NOTE — ASSESSMENT & PLAN NOTE
Given her presentation she has been given supplemental oxygen with sats up to 93%  I and concerned she could possible have another PE as she had in 2014 which appears to have been unprovoked and at the time recommended long term anticoagulation.    3/7/19:  Resolved as she is hypercapnic and after reviewing previous records there is a history of COPD.  It appears she has bilateral PNA will consolidations seen on CTA and no PE.  -transfer to ICU and place on Bipap with repeat ABG in about 1 hour  -consult Pulmonology     3/8/19: still on BiPAP but will attempt to wean as she is now DNR and may not tolerate being off as discussion will be made with her son today. This is secondary to underlying PNA with possible underlying aspiration    3/9/19: still doing well off BiPAP and now on nasal cannula, monitor closely as she is a CO2 retainer.    3/10/19: on nasal cannula stable

## 2019-03-10 NOTE — PLAN OF CARE
Problem: Adult Inpatient Plan of Care  Goal: Plan of Care Review  Outcome: Ongoing (interventions implemented as appropriate)  Patient is awake when talk to her. Her 's spo2 and breathing are stable on nasal cannula 2 lpm.

## 2019-03-10 NOTE — PLAN OF CARE
Problem: Occupational Therapy Goal  Goal: Occupational Therapy Goal  1.  PT WILL (S) WITH ALL LEVELS OF BED MOBILITY.  2.  PT WILL DON/DOFF SOCKS WITH MIN (A).  3.  PT WILL BE MIN (A) WITH EOB <> BSC T/F.  4.  PT WILL PERFORM X1SET X15-20 REPS OF RESISTANCE THERE EX TO INCREASE FX STRENGTH/ ENDURANCE.      CONT TO ADDRESS UNMET GOALS.

## 2019-03-10 NOTE — PT/OT/SLP PROGRESS
"Occupational Therapy  Treatment    Ashley Koenig   MRN: [id number]   Admitting Diagnosis: Aspiration pneumonia    OT Date of Treatment: 03/10/19   OT Start Time: 0845  OT Stop Time: 0900  OT Total Time (min): 15 min    Billable Minutes:  Therapeutic Activity 15    General Precautions: Standard, fall  Orthopedic Precautions: N/A  Braces: N/A         Subjective:  Communicated with NURSE prior to session.    Pain/Comfort  Pain Rating 1: 0/10    Objective:  Patient found with: oxygen, peripheral IV, SCD, telemetry     Functional Mobility:  Bed Mobility:  NT       Transfers:  NT      Functional Ambulation: NT    Activities of Daily Living:     Feeding adaptive equipment: NT     UE adaptive equipment:NT    LE adaptive equipment:NT                    Bathing adaptive equipment:NT    Balance:   Static Sit: NT  Dynamic Sit: NT  Static Stand: NT  Dynamic stand: NT    Therapeutic Activities and Exercises:  PT SET UP TO COMB HAIR.  PT ALSO PERFORMED X15 REPS OF BUE STRENGTHENING WITH 1# DOWEL FOR SHLD FLEX/EXTEN, CHEST PRESS AND ARM CURLS WITH SHLD FLEX TO 90 DEGREES AS TOLERATED.    AM-PAC 6 CLICK ADL   How much help from another person does this patient currently need?   1 = Unable, Total/Dependent Assistance  2 = A lot, Maximum/Moderate Assistance  3 = A little, Minimum/Contact Guard/Supervision  4 = None, Modified [place]/Independent    Putting on and taking off regular lower body clothing? : 2  Bathing (including washing, rinsing, drying)?: 2  Toileting, which includes using toilet, bedpan, or urinal? : 2  Putting on and taking off regular upper body clothing?: 2  Taking care of personal grooming such as brushing teeth?: 3  Eating meals?: 3  Daily Activity Total Score: 14     AM-PAC Raw Score CMS "G-Code Modifier Level of Impairment Assistance   6 % Total / Unable   7 - 8 CM 80 - 100% Maximal Assist   9-13 CL 60 - 80% Moderate Assist   14 - 19 CK 40 - 60% Moderate Assist   20 - 22 CJ 20 - 40% Minimal Assist   23 " CI 1-20% SBA / CGA   24 CH 0% Independent/ Mod I       Patient left up in chair with all lines intact and call button in reach    ASSESSMENT:  Ashley Koenig is a 89 y.o. female with a medical diagnosis of Aspiration pneumonia and presents with SELF CARE.    Rehab identified problem list/impairments: Rehab identified problem list/impairments: weakness, impaired endurance, impaired self care skills, impaired functional mobilty, impaired balance, decreased upper extremity function, decreased coordination    Rehab potential is good.    Activity tolerance: Good    Discharge recommendations: Discharge Facility/Level of Care Needs: nursing facility, skilled     Barriers to discharge: Barriers to Discharge: (TBD)    Equipment recommendations: commode     GOALS:   Multidisciplinary Problems     Occupational Therapy Goals        Problem: Occupational Therapy Goal    Goal Priority Disciplines Outcome Interventions   Occupational Therapy Goal     OT, PT/OT Ongoing (interventions implemented as appropriate)    Description:  1.  PT WILL (S) WITH ALL LEVELS OF BED MOBILITY.  2.  PT WILL DON/DOFF SOCKS WITH MIN (A).  3.  PT WILL BE MIN (A) WITH EOB <> BSC T/F.  4.  PT WILL PERFORM X1SET X15-20 REPS OF RESISTANCE THERE EX TO INCREASE FX STRENGTH/ ENDURANCE.                       Plan:  Patient to be seen 3 x/week to address the above listed problems via self-care/home management, therapeutic exercises, therapeutic activities  Plan of Care expires: 03/16/19  Plan of Care reviewed with: patient    OT G-codes  Functional Assessment Tool Used: [facility]  Score: 14    Sima Pardo OT  03/10/2019

## 2019-03-10 NOTE — SUBJECTIVE & OBJECTIVE
Interval History: Seen and examined at bedside. Overnights events noted. Doing much better and off BiPAP on nasal cannula. SLT evaluation => oropharyngeal dysphagia and an increased risk of aspiration. Broaden IV antibiotic coverage to cover Aspiration Pneumonia.       Review of Systems   Respiratory: Positive for cough.    All other systems reviewed and are negative.    Objective:     Vital Signs (Most Recent):  Temp: 98 °F (36.7 °C) (03/10/19 1158)  Pulse: 71 (03/10/19 1158)  Resp: 18 (03/10/19 1158)  BP: (!) 148/64 (03/10/19 1158)  SpO2: (!) 93 % (03/10/19 1158) Vital Signs (24h Range):  Temp:  [97.8 °F (36.6 °C)-98.8 °F (37.1 °C)] 98 °F (36.7 °C)  Pulse:  [67-86] 71  Resp:  [18-30] 18  SpO2:  [92 %-96 %] 93 %  BP: (114-152)/(58-68) 148/64     Weight: 78.1 kg (172 lb 2.9 oz)  Body mass index is 27.02 kg/m².      Intake/Output Summary (Last 24 hours) at 3/10/2019 1209  Last data filed at 3/10/2019 0700  Gross per 24 hour   Intake --   Output 425 ml   Net -425 ml       Physical Exam   Constitutional: She appears well-developed and well-nourished. She is cooperative.  Non-toxic appearance. She does not have a sickly appearance. She appears ill. No distress. She is not intubated. Nasal cannula in place.   HENT:   Head: Normocephalic and atraumatic.   Mouth/Throat: Mucous membranes are dry (post wearing BiPAP).   Eyes: EOM and lids are normal. Pupils are equal, round, and reactive to light.   Neck: Trachea normal and full passive range of motion without pain. Carotid bruit is not present.   Cardiovascular: Normal rate and regular rhythm. Exam reveals distant heart sounds.   Pulses:       Radial pulses are 2+ on the right side, and 2+ on the left side.        Dorsalis pedis pulses are 1+ on the right side, and 1+ on the left side.   Pulmonary/Chest: Effort normal. No accessory muscle usage. No tachypnea. She is not intubated. No respiratory distress. She has decreased breath sounds in the right lower field and the left  lower field. She has rales.   Abdominal: Soft. Normal appearance. She exhibits no distension. Bowel sounds are decreased. There is no tenderness.   Musculoskeletal: Normal range of motion.        Right foot: There is no deformity.        Left foot: There is no deformity.   Trace LE edema   Lymphadenopathy:     She has no cervical adenopathy.   Neurological: She is alert.   Fully awake and alert and responsive with mild to mod confusion and baseline dementia   Skin: Skin is warm, dry and intact. Capillary refill takes less than 2 seconds. No rash noted. No cyanosis.   Psychiatric: She has a normal mood and affect. Her speech is normal and behavior is normal. She exhibits abnormal recent memory.       Vents:  Oxygen Concentration (%): 28 (03/10/19 0804)    Lines/Drains/Airways     Drain            Female External Urinary Catheter 03/07/19 1900 2 days          Peripheral Intravenous Line                 Peripheral IV - Single Lumen 03/08/19 1547 Anterior;Right Forearm 1 day                Significant Labs:      Laboratory Data:  Reviewed recent labs. Appear stable other than abnormalities addressed in plan.     Radiology:  Reviewed recent imaging studies. Appear stable other than abnormalities addressed in plan.

## 2019-03-10 NOTE — PLAN OF CARE
Problem: Adult Inpatient Plan of Care  Goal: Plan of Care Review  Outcome: Ongoing (interventions implemented as appropriate)  POC reviewed with patient, pt is confused so unable to verbalize  understanding. Pt remains free from falls. Fall precautions in place. Bed alarm in use. NS on cardiac monitor. VSS. No other complaints at this time. Call bell w/in reach. Reminded to call for assistance. Pt was repositioned  often and wedge was used to maintain position. Purewick in place with no signs of redness or trama. Clear yellow urine was being pulled in the canister. Pt tolerated well. PT had to be reoriented often due to increased confusion. Pt tolerated being on 3LNC without any issues of hypoxia during my shift.

## 2019-03-11 LAB
ALBUMIN SERPL BCP-MCNC: 2.3 G/DL
ALLENS TEST: ABNORMAL
ALP SERPL-CCNC: 97 U/L
ALT SERPL W/O P-5'-P-CCNC: 21 U/L
ANION GAP SERPL CALC-SCNC: 5 MMOL/L
AST SERPL-CCNC: 38 U/L
BACTERIA BLD CULT: NORMAL
BACTERIA BLD CULT: NORMAL
BASO STIPL BLD QL SMEAR: ABNORMAL
BASOPHILS # BLD AUTO: 0.06 K/UL
BASOPHILS NFR BLD: 0.5 %
BILIRUB SERPL-MCNC: 0.4 MG/DL
BUN SERPL-MCNC: 15 MG/DL
CALCIUM SERPL-MCNC: 9.2 MG/DL
CHLORIDE SERPL-SCNC: 95 MMOL/L
CO2 SERPL-SCNC: 42 MMOL/L
CREAT SERPL-MCNC: 0.8 MG/DL
DACRYOCYTES BLD QL SMEAR: ABNORMAL
DELSYS: ABNORMAL
DIFFERENTIAL METHOD: ABNORMAL
EOSINOPHIL # BLD AUTO: 0.4 K/UL
EOSINOPHIL NFR BLD: 3.1 %
ERYTHROCYTE [DISTWIDTH] IN BLOOD BY AUTOMATED COUNT: 14.3 %
EST. GFR  (AFRICAN AMERICAN): >60 ML/MIN/1.73 M^2
EST. GFR  (NON AFRICAN AMERICAN): >60 ML/MIN/1.73 M^2
FLOW: 2
GLUCOSE SERPL-MCNC: 101 MG/DL
HCO3 UR-SCNC: 45.7 MMOL/L (ref 24–28)
HCT VFR BLD AUTO: 40.5 %
HGB BLD-MCNC: 12.3 G/DL
LYMPHOCYTES # BLD AUTO: 1.6 K/UL
LYMPHOCYTES NFR BLD: 12.8 %
MAGNESIUM SERPL-MCNC: 2.1 MG/DL
MCH RBC QN AUTO: 32.7 PG
MCHC RBC AUTO-ENTMCNC: 30.4 G/DL
MCV RBC AUTO: 108 FL
MODE: ABNORMAL
MONOCYTES # BLD AUTO: 1 K/UL
MONOCYTES NFR BLD: 7.6 %
NEUTROPHILS # BLD AUTO: 9.4 K/UL
NEUTROPHILS NFR BLD: 76 %
PCO2 BLDA: 74.1 MMHG (ref 35–45)
PH SMN: 7.4 [PH] (ref 7.35–7.45)
PLATELET # BLD AUTO: 339 K/UL
PLATELET BLD QL SMEAR: ABNORMAL
PMV BLD AUTO: 9.2 FL
PO2 BLDA: 74 MMHG (ref 80–100)
POC BE: 21 MMOL/L
POC SATURATED O2: 94 % (ref 95–100)
POIKILOCYTOSIS BLD QL SMEAR: SLIGHT
POLYCHROMASIA BLD QL SMEAR: ABNORMAL
POTASSIUM SERPL-SCNC: 4.1 MMOL/L
PROT SERPL-MCNC: 6 G/DL
RBC # BLD AUTO: 3.76 M/UL
SAMPLE: ABNORMAL
SITE: ABNORMAL
SODIUM SERPL-SCNC: 142 MMOL/L
STOMATOCYTES BLD QL SMEAR: PRESENT
TARGETS BLD QL SMEAR: ABNORMAL
WBC # BLD AUTO: 12.43 K/UL

## 2019-03-11 PROCEDURE — 63600175 PHARM REV CODE 636 W HCPCS: Performed by: FAMILY MEDICINE

## 2019-03-11 PROCEDURE — 27000221 HC OXYGEN, UP TO 24 HOURS

## 2019-03-11 PROCEDURE — 25000242 PHARM REV CODE 250 ALT 637 W/ HCPCS: Performed by: FAMILY MEDICINE

## 2019-03-11 PROCEDURE — 85025 COMPLETE CBC W/AUTO DIFF WBC: CPT

## 2019-03-11 PROCEDURE — 21400001 HC TELEMETRY ROOM

## 2019-03-11 PROCEDURE — 99900035 HC TECH TIME PER 15 MIN (STAT)

## 2019-03-11 PROCEDURE — 92526 ORAL FUNCTION THERAPY: CPT

## 2019-03-11 PROCEDURE — 36415 COLL VENOUS BLD VENIPUNCTURE: CPT

## 2019-03-11 PROCEDURE — 82803 BLOOD GASES ANY COMBINATION: CPT

## 2019-03-11 PROCEDURE — 94660 CPAP INITIATION&MGMT: CPT

## 2019-03-11 PROCEDURE — 25000003 PHARM REV CODE 250: Performed by: FAMILY MEDICINE

## 2019-03-11 PROCEDURE — 94640 AIRWAY INHALATION TREATMENT: CPT

## 2019-03-11 PROCEDURE — 83735 ASSAY OF MAGNESIUM: CPT

## 2019-03-11 PROCEDURE — 25000003 PHARM REV CODE 250: Performed by: NURSE PRACTITIONER

## 2019-03-11 PROCEDURE — 36600 WITHDRAWAL OF ARTERIAL BLOOD: CPT

## 2019-03-11 PROCEDURE — 63600175 PHARM REV CODE 636 W HCPCS: Performed by: NURSE PRACTITIONER

## 2019-03-11 PROCEDURE — 80053 COMPREHEN METABOLIC PANEL: CPT

## 2019-03-11 RX ADMIN — PIPERACILLIN AND TAZOBACTAM 4.5 G: 4; .5 INJECTION, POWDER, LYOPHILIZED, FOR SOLUTION INTRAVENOUS; PARENTERAL at 10:03

## 2019-03-11 RX ADMIN — POLYETHYLENE GLYCOL 3350 17 G: 17 POWDER, FOR SOLUTION ORAL at 08:03

## 2019-03-11 RX ADMIN — Medication 100 MG: at 10:03

## 2019-03-11 RX ADMIN — ENOXAPARIN SODIUM 40 MG: 100 INJECTION SUBCUTANEOUS at 05:03

## 2019-03-11 RX ADMIN — IPRATROPIUM BROMIDE AND ALBUTEROL SULFATE 3 ML: .5; 3 SOLUTION RESPIRATORY (INHALATION) at 01:03

## 2019-03-11 RX ADMIN — ESCITALOPRAM OXALATE 20 MG: 10 TABLET, FILM COATED ORAL at 08:03

## 2019-03-11 RX ADMIN — ASPIRIN 81 MG: 81 TABLET, COATED ORAL at 08:03

## 2019-03-11 RX ADMIN — PIPERACILLIN AND TAZOBACTAM 4.5 G: 4; .5 INJECTION, POWDER, LYOPHILIZED, FOR SOLUTION INTRAVENOUS; PARENTERAL at 02:03

## 2019-03-11 RX ADMIN — IPRATROPIUM BROMIDE AND ALBUTEROL SULFATE 3 ML: .5; 3 SOLUTION RESPIRATORY (INHALATION) at 08:03

## 2019-03-11 RX ADMIN — PIPERACILLIN AND TAZOBACTAM 4.5 G: 4; .5 INJECTION, POWDER, LYOPHILIZED, FOR SOLUTION INTRAVENOUS; PARENTERAL at 06:03

## 2019-03-11 RX ADMIN — FAMOTIDINE 20 MG: 20 TABLET, FILM COATED ORAL at 08:03

## 2019-03-11 RX ADMIN — IPRATROPIUM BROMIDE AND ALBUTEROL SULFATE 3 ML: .5; 3 SOLUTION RESPIRATORY (INHALATION) at 07:03

## 2019-03-11 NOTE — PLAN OF CARE
Problem: SLP Goal  Goal: SLP Goal  1. Pt to tolerate a pureed diet and nectar thickened liquids with use of swallowing strategies given min A via verbal cues.  2. Pt to tolerate upgraded diet trials with no overt s/s of aspiration.   3. Pt will complete oral motor and pharyngeal swallow exercises x 10 repetitions each to improve oropharyngeal swallow function.    Outcome: Ongoing (interventions implemented as appropriate)  Pt completed swallow ex.

## 2019-03-11 NOTE — PLAN OF CARE
Problem: Adult Inpatient Plan of Care  Goal: Plan of Care Review  Outcome: Ongoing (interventions implemented as appropriate)  POC reviewed with patient, and pt family who was at the bedside at the beginning of the shift. Family verbalized understanding. Pt remains free from falls. Fall precautions in place. Bed alarm in use. NS on cardiac monitor. VSS. No other complaints at this time. Call bell w/in reach. Reminded to call for assistance. Hourly rounding performed, frequent weight shifting provided . Pt had 2 large loose bowel movements at the beginning of shift, so unable to use pure wick. Pure wick was reapplied at 0240 and pt tolerated well. Oxygen at 2LNC. IV abx administered during my shift as ordered. Reassurance and reorientation provided to pt during my shift since pt is confused to time and situation

## 2019-03-11 NOTE — PT/OT/SLP PROGRESS
Physical Therapy      Patient Name:  Ashley Koenig   MRN:  [id number]    PT attempted tx at 9:55am, pt needed to be cleaned, then at 11:15 and pt not able to hear/ understand therapist and wanted to wait until her  was back in room. PT will attempt tx at later time/ date in order to continue with POC.     Claudia Boyce, PT/OT   3/11/2019

## 2019-03-11 NOTE — PT/OT/SLP PROGRESS
Speech Language Pathology Treatment    Patient Name:  Ashley Koenig   MRN:  [id number]  Admitting Diagnosis: Aspiration pneumonia    Recommendations:                 General Recommendations:  Dysphagia therapy  Diet recommendations:  Puree, Liquid Diet Level: Nectar Thick   Aspiration Precautions: 1 bite/sip at a time, Alternating bites/sips, HOB to 90 degrees and Small bites/sips   General Precautions: Standard, aspiration  Communication strategies:  none    Subjective     Pt cooperative but [place].  Patient goals: none stated    Pain/Comfort:  · Pain Rating 1: 0/10  · Pain Rating Post-Intervention 1: 0/10    Objective:     Has the patient been evaluated by SLP for swallowing?      Keep patient NPO?     Current Respiratory Status: nasal cannula      Pt and son educated on use of thickener and purpose of exercise. Pt completed oral motor , tongue base retraction and laryngeal elevation ex x 20 each with mod cues.     Assessment:     Ashley Koenig is a 89 y.o. female with an SLP diagnosis of Dysphagia.  She presents with impaired swallow.    Goals:   Multidisciplinary Problems     SLP Goals        Problem: SLP Goal    Goal Priority Disciplines Outcome   SLP Goal     SLP Ongoing (interventions implemented as appropriate)   Description:  1. Pt to tolerate a pureed diet and nectar thickened liquids with use of swallowing strategies given min A via verbal cues.  2. Pt to tolerate upgraded diet trials with no overt s/s of aspiration.   3. Pt will complete oral motor and pharyngeal swallow exercises x 10 repetitions each to improve oropharyngeal swallow function.                     Plan:     · Patient to be seen:  3 x/week   · Plan of Care expires:  03/14/19  · Plan of Care reviewed with:  patient, son   · SLP Follow-Up:  Yes       Discharge recommendations:  nursing facility, skilled   Barriers to Discharge:  None    Time Tracking:     SLP Treatment Date:   03/11/19  Speech Start Time:  0945  Speech Stop Time:  1482      Speech Total Time (min):  19 min    Billable Minutes: Treatment Swallowing Dysfunction 19    Carlita Priest CCC-SLP  03/11/2019

## 2019-03-12 VITALS
HEIGHT: 67 IN | SYSTOLIC BLOOD PRESSURE: 148 MMHG | HEART RATE: 66 BPM | RESPIRATION RATE: 20 BRPM | OXYGEN SATURATION: 93 % | BODY MASS INDEX: 27.06 KG/M2 | TEMPERATURE: 98 F | WEIGHT: 172.38 LBS | DIASTOLIC BLOOD PRESSURE: 67 MMHG

## 2019-03-12 LAB
ALBUMIN SERPL BCP-MCNC: 2.4 G/DL
ALP SERPL-CCNC: 92 U/L
ALT SERPL W/O P-5'-P-CCNC: 24 U/L
ANION GAP SERPL CALC-SCNC: 8 MMOL/L
AST SERPL-CCNC: 39 U/L
BASOPHILS # BLD AUTO: ABNORMAL K/UL
BASOPHILS NFR BLD: 0 %
BILIRUB SERPL-MCNC: 0.4 MG/DL
BUN SERPL-MCNC: 17 MG/DL
CALCIUM SERPL-MCNC: 9.3 MG/DL
CHLORIDE SERPL-SCNC: 94 MMOL/L
CO2 SERPL-SCNC: 38 MMOL/L
CREAT SERPL-MCNC: 0.8 MG/DL
DIFFERENTIAL METHOD: ABNORMAL
EOSINOPHIL # BLD AUTO: ABNORMAL K/UL
EOSINOPHIL NFR BLD: 3 %
ERYTHROCYTE [DISTWIDTH] IN BLOOD BY AUTOMATED COUNT: 14.4 %
EST. GFR  (AFRICAN AMERICAN): >60 ML/MIN/1.73 M^2
EST. GFR  (NON AFRICAN AMERICAN): >60 ML/MIN/1.73 M^2
GLUCOSE SERPL-MCNC: 94 MG/DL
HCT VFR BLD AUTO: 41.1 %
HGB BLD-MCNC: 12.5 G/DL
LYMPHOCYTES # BLD AUTO: ABNORMAL K/UL
LYMPHOCYTES NFR BLD: 10 %
MAGNESIUM SERPL-MCNC: 2.3 MG/DL
MCH RBC QN AUTO: 32.7 PG
MCHC RBC AUTO-ENTMCNC: 30.4 G/DL
MCV RBC AUTO: 108 FL
METAMYELOCYTES NFR BLD MANUAL: 1 %
MONOCYTES # BLD AUTO: ABNORMAL K/UL
MONOCYTES NFR BLD: 1 %
MYELOCYTES NFR BLD MANUAL: 4 %
NEUTROPHILS NFR BLD: 78 %
NEUTS BAND NFR BLD MANUAL: 3 %
PLATELET # BLD AUTO: 331 K/UL
PMV BLD AUTO: 9 FL
POCT GLUCOSE: 115 MG/DL (ref 70–110)
POIKILOCYTOSIS BLD QL SMEAR: SLIGHT
POTASSIUM SERPL-SCNC: 4.2 MMOL/L
PROT SERPL-MCNC: 6.1 G/DL
RBC # BLD AUTO: 3.82 M/UL
SODIUM SERPL-SCNC: 140 MMOL/L
STOMATOCYTES BLD QL SMEAR: PRESENT
TARGETS BLD QL SMEAR: ABNORMAL
WBC # BLD AUTO: 13.17 K/UL

## 2019-03-12 PROCEDURE — 94761 N-INVAS EAR/PLS OXIMETRY MLT: CPT

## 2019-03-12 PROCEDURE — A4216 STERILE WATER/SALINE, 10 ML: HCPCS | Performed by: FAMILY MEDICINE

## 2019-03-12 PROCEDURE — 85027 COMPLETE CBC AUTOMATED: CPT

## 2019-03-12 PROCEDURE — 63600175 PHARM REV CODE 636 W HCPCS: Performed by: NURSE PRACTITIONER

## 2019-03-12 PROCEDURE — 85007 BL SMEAR W/DIFF WBC COUNT: CPT

## 2019-03-12 PROCEDURE — 25000242 PHARM REV CODE 250 ALT 637 W/ HCPCS: Performed by: FAMILY MEDICINE

## 2019-03-12 PROCEDURE — 36415 COLL VENOUS BLD VENIPUNCTURE: CPT

## 2019-03-12 PROCEDURE — 94640 AIRWAY INHALATION TREATMENT: CPT

## 2019-03-12 PROCEDURE — 97110 THERAPEUTIC EXERCISES: CPT

## 2019-03-12 PROCEDURE — 97530 THERAPEUTIC ACTIVITIES: CPT

## 2019-03-12 PROCEDURE — 25000003 PHARM REV CODE 250: Performed by: FAMILY MEDICINE

## 2019-03-12 PROCEDURE — 99900035 HC TECH TIME PER 15 MIN (STAT)

## 2019-03-12 PROCEDURE — 80053 COMPREHEN METABOLIC PANEL: CPT

## 2019-03-12 PROCEDURE — 25000003 PHARM REV CODE 250: Performed by: NURSE PRACTITIONER

## 2019-03-12 PROCEDURE — 83735 ASSAY OF MAGNESIUM: CPT

## 2019-03-12 PROCEDURE — 27000221 HC OXYGEN, UP TO 24 HOURS

## 2019-03-12 PROCEDURE — 97116 GAIT TRAINING THERAPY: CPT

## 2019-03-12 RX ORDER — ASPIRIN 81 MG/1
81 TABLET ORAL DAILY
Refills: 0 | COMMUNITY
Start: 2019-03-13 | End: 2020-03-12

## 2019-03-12 RX ORDER — LEVOFLOXACIN 500 MG/1
500 TABLET, FILM COATED ORAL DAILY
Qty: 7 TABLET | Refills: 0 | Status: SHIPPED | OUTPATIENT
Start: 2019-03-12 | End: 2019-05-06

## 2019-03-12 RX ORDER — FAMOTIDINE 20 MG/1
20 TABLET, FILM COATED ORAL DAILY
Qty: 30 TABLET | Refills: 11 | Status: SHIPPED | OUTPATIENT
Start: 2019-03-13 | End: 2019-05-06

## 2019-03-12 RX ADMIN — PIPERACILLIN AND TAZOBACTAM 4.5 G: 4; .5 INJECTION, POWDER, LYOPHILIZED, FOR SOLUTION INTRAVENOUS; PARENTERAL at 10:03

## 2019-03-12 RX ADMIN — ESCITALOPRAM OXALATE 20 MG: 10 TABLET, FILM COATED ORAL at 09:03

## 2019-03-12 RX ADMIN — ASPIRIN 81 MG: 81 TABLET, COATED ORAL at 09:03

## 2019-03-12 RX ADMIN — IPRATROPIUM BROMIDE AND ALBUTEROL SULFATE 3 ML: .5; 3 SOLUTION RESPIRATORY (INHALATION) at 01:03

## 2019-03-12 RX ADMIN — IPRATROPIUM BROMIDE AND ALBUTEROL SULFATE 3 ML: .5; 3 SOLUTION RESPIRATORY (INHALATION) at 07:03

## 2019-03-12 RX ADMIN — PIPERACILLIN AND TAZOBACTAM 4.5 G: 4; .5 INJECTION, POWDER, LYOPHILIZED, FOR SOLUTION INTRAVENOUS; PARENTERAL at 03:03

## 2019-03-12 RX ADMIN — Medication 3 ML: at 03:03

## 2019-03-12 RX ADMIN — FAMOTIDINE 20 MG: 20 TABLET, FILM COATED ORAL at 09:03

## 2019-03-12 NOTE — PLAN OF CARE
Problem: Physical Therapy Goal  Goal: Physical Therapy Goal  1. Patient will perform supine to sit with mod indep  2. Patient will perform sit to stand with RW min A to facilitate t/f's  3. Patient will amb >75ft RW vs 4wrw min A   Outcome: Ongoing (interventions implemented as appropriate)  PATIENT PROGRESSING WELL WITH OOB T/FS TO B/S CHAIR , GT ACTIVITIES IN ROOM.

## 2019-03-12 NOTE — PLAN OF CARE
03/12/19 1435   Post-Acute Status   Post-Acute Authorization Home Health/Hospice   Home Health/Hospice Status Awaiting Internal Medical Clearance

## 2019-03-12 NOTE — DISCHARGE SUMMARY
[facility] Hospital Medicine  Discharge Summary      Patient Name: Ashley Koenig  MRN: [id number]  Admission Date: 3/6/2019  Hospital Length of Stay: 6 days  Discharge Date and Time: 3/12/2019  5:24 PM  Attending Physician: No att. providers found   Discharging Provider: Douglas Berman MD  Primary Care Provider: Maggy Ngo MD      HPI:   Ms. Koenig is a 89 yo fairly healthy white female who presented to the ED with her son complaining of SOB associated with a cough. Per the son this has been ongoing for about 1 week. Upon presentation she was found to be in some respiratory distress and hypoxia with oxygen saturation at 86%. She also was found down by family members but denies any LOC.     Chest Xray shows prominent size heart with interstitial opacities of both lungs with Carlee B-lines. Per the patient and her son there is no known history of CHF but he does give a history of PE which from history doesn't sound like it was provoked.    * No surgery found *      Hospital Course:   3/7/19: elderly female admitted yesterday with hypoxia and was thought to have decompensated diastolic dysfunction found this am very lethargic and ABG showed hypercapnia with Pco2 of 76. CTA ruled out PE but showed bilateral consolidation lungs    3/8/19: patient transferred to ICU yesterday and placed on Bipap but much more alert today. She was made DNR by her son. CXR is improved with some mild haziness of both lungs and small pleural effusion of left. Her WBC is trending down to 16K.    3/9/19: patient is doing much better and off BiPAP on nasal cannula and has been downgraded to tele but no beds. Her WBC back up today and abx changed to give better anaerobic coverage as she is aspirating and appreciate ST.    3/10/19: still with coughing her WBC now normal and clinically she looks better.    3/11/19 needs nocturnal bipap. Need to discuss with family about discharge planning.     3/12  Patient Improved  steadily, seen and examined today , deemed stable for a safe discharge to home. Patient diuresed following echo where it was determined patient has Grade 2 Diastolic Dysfunction. Patient placed on abx for home completion. Patient evaluated for aspiration by speech who made the following recommendations:               General Recommendations:  Dysphagia therapy  Diet recommendations:  Puree, Liquid Diet Level: Nectar Thick   Aspiration Precautions: 1 bite/sip at a time, Alternating bites/sips, HOB to 90 degrees and Small bites/sips   General Precautions: Standard, aspiration         Consults:   Consults (From admission, onward)        Status Ordering Provider     Inpatient consult to Pulmonology  Once     Provider:  (Not yet assigned)    Completed CLAUS CAI     IP consult to case management  Once     Provider:  (Not yet assigned)    Completed CLAUS CAI          No new Assessment & Plan notes have been filed under this hospital service since the last note was generated.  Service: Hospital Medicine    Final Active Diagnoses:    Diagnosis Date Noted POA    PRINCIPAL PROBLEM:  Aspiration pneumonia [J69.0] 03/06/2019 Yes    Acute hypoxemic respiratory failure [J96.01] 03/06/2019 Yes    Acute on chronic diastolic congestive heart failure [I50.33] 03/06/2019 Yes    Anxiety [F41.9] 03/06/2019 Yes    Acute cystitis without hematuria [N30.00] 03/06/2019 Yes    Dementia [F03.90] 01/10/2019 Yes     Chronic    Coronary artery disease involving native coronary artery of native heart without angina pectoris [I25.10] 09/12/2017 Yes     Chronic      Problems Resolved During this Admission:       Discharged Condition: stable    Disposition: Home or Self Care    Follow Up:  Follow-up Information     Maggy Ngo MD In 3 days.    Specialty:  Family Medicine  Contact information:  [address] [facility] [address] [phone]             [facility].    Specialty:  DME Provider  Why:  People  will assign Home Health Agency  Contact information:  [address] [facility] [facility] [address]  [address]  [place] [address]  [phone]                 Patient Instructions:      Ambulatory referral to Home Health   Referral Priority: Routine Referral Type: Home Health   Referral Reason: Specialty Services Required   Requested Specialty: Home Health Services   Number of Visits Requested: 1     Diet Cardiac   Order Comments: Puree with Nectar Thick Liquids     Activity as tolerated       Significant Diagnostic Studies: Labs:   BMP:   Recent Labs   Lab 03/11/19  0508 03/12/19  0445    94    140   K 4.1 4.2   CL 95 94*   CO2 42* 38*   BUN 15 17   CREATININE 0.8 0.8   CALCIUM 9.2 9.3   MG 2.1 2.3   , CMP   Recent Labs   Lab 03/11/19  0508 03/12/19  0445    140   K 4.1 4.2   CL 95 94*   CO2 42* 38*    94   BUN 15 17   CREATININE 0.8 0.8   CALCIUM 9.2 9.3   PROT 6.0 6.1   ALBUMIN 2.3* 2.4*   BILITOT 0.4 0.4   ALKPHOS 97 92   AST 38 39   ALT 21 24   ANIONGAP 5* 8   ESTGFRAFRICA >60 >60   EGFRNONAA >60 >60   , CBC   Recent Labs   Lab 03/11/19  0508 03/12/19  0445   WBC 12.43 13.17*   HGB 12.3 12.5   HCT 40.5 41.1    331    and All labs within the past 24 hours have been reviewed    Pending Diagnostic Studies:     None         Medications:  Reconciled Home Medications:      Medication List      START taking these medications    aspirin 81 MG EC tablet  Commonly known as:  ECOTRIN  Take 1 tablet (81 mg total) by mouth once daily.  Start taking on:  3/13/2019     famotidine 20 MG tablet  Commonly known as:  PEPCID  Take 1 tablet (20 mg total) by mouth once daily.  Start taking on:  3/13/2019     levoFLOXacin 500 MG tablet  Commonly known as:  LEVAQUIN  Take 1 tablet (500 mg total) by mouth once daily.        CONTINUE taking these medications    escitalopram oxalate 20 MG tablet  Commonly known as:  LEXAPRO  Take 1 tablet (20 mg total) by mouth once daily.     niacin 100 MG Tab  Take 100 mg by mouth every  evening.     VITAMINS AND MINERALS ORAL  Take by mouth once daily.     XANAX 0.25 MG tablet  Generic drug:  ALPRAZolam  Take 0.25 mg by mouth 2 (two) times daily as needed for Anxiety.            Indwelling Lines/Drains at time of discharge:   Lines/Drains/Airways     Drain            Female External Urinary Catheter 03/07/19 1900 4 days                Time spent on the discharge of patient: 35 minutes  Patient was seen and examined on the date of discharge and determined to be suitable for discharge.         Douglas Berman MD  Department of Hospital Medicine  [facility]

## 2019-03-12 NOTE — PT/OT/SLP PROGRESS
Occupational Therapy  Treatment    Ashley Koenig   MRN: [id number]   Admitting Diagnosis: Aspiration pneumonia    OT Date of Treatment: 03/12/19   OT Start Time: 1300  OT Stop Time: 1325  OT Total Time (min): 25 min    Billable Minutes:  Therapeutic Activity 25 minutes    General Precautions: Standard, fall  Orthopedic Precautions: N/A  Braces: N/A         Subjective:  Communicated with nurse and epic chart review prior to session.  Pain/Comfort  Pain Rating 1: 0/10    Objective:  Patient found with: peripheral IV, oxygen, telemetry     Functional Mobility:  Bed Mobility:    Min a  Transfers:   min a  Functional Ambulation:   Min a with functional mobility x 20 feet with rolling walker, unsteady gait, and assistance with navigating rolling walker  Activities of Daily Living:     Feeding adaptive equipment: na     UE adaptive equipment: na     LE adaptive equipment: na    Bathing adaptive equipment:na    Balance:   Static Sit: FAIR+: Able to take MINIMAL challenges from all directions  Dynamic Sit: FAIR: Cannot move trunk without losing balance  Static Stand: POOR+: Needs MINIMAL assist to maintain  Dynamic stand: POOR+: Needs MIN (minimal ) assist during gait    Therapeutic Activities and Exercises:  Pt seen in room. Pt cooperative with therapy session.pt req min a with rolling l>r and supine>sit. Pt req cga with forward scooting. Pt req min a with sit<>stand and functional mobility 20 feet with min a , assistance with navigating rw, verbal and tactile cues for safety. Pt left sitting in bed side chair with all needs met and grandson as well as pt educated on safety.    AM-PAC 6 CLICK ADL   How much help from another r person does this patient currently need?   1 = Unable, Total/Dependent Assistance  2 = A lot, Maximum/Moderate Assistance  3 = A little, Minimum/Contact Guard/Supervision  4 = None, Modified [place]/Independent    Putting on and taking off regular lower body clothing? : 2  Bathing (including  "washing, rinsing, drying)?: 2  Toileting, which includes using toilet, bedpan, or urinal? : 2  Putting on and taking off regular upper body clothing?: 2  Taking care of personal grooming such as brushing teeth?: 3  Eating meals?: 3  Daily Activity Total Score: 14     AM-PAC Raw Score CMS "G-Code Modifier Level of Impairment Assistance   6 % Total / Unable   7 - 8 CM 80 - 100% Maximal Assist   9-13 CL 60 - 80% Moderate Assist   14 - 19 CK 40 - 60% Moderate Assist   20 - 22 CJ 20 - 40% Minimal Assist   23 CI 1-20% SBA / CGA   24 CH 0% Independent/ Mod I       Patient left up in chair with all lines intact, call button in reach, nurse notified and grandson present    ASSESSMENT:  Ashley Koenig is a 89 y.o. female with a medical diagnosis of Aspiration pneumonia and presents with debility and generalized weakness. Pt will continue to benefit from skilled ot.    Rehab identified problem list/impairments: Rehab identified problem list/impairments: weakness, impaired functional mobilty, impaired balance, decreased upper extremity function, decreased safety awareness, impaired self care skills, impaired endurance, gait instability, decreased coordination, decreased lower extremity function    Rehab potential is good.    Activity tolerance: Good    Discharge recommendations: Discharge Facility/Level of Care Needs: nursing facility, skilled     Barriers to discharge: Barriers to Discharge: (tbd)    Equipment recommendations: none     GOALS:   Multidisciplinary Problems     Occupational Therapy Goals        Problem: Occupational Therapy Goal    Goal Priority Disciplines Outcome Interventions   Occupational Therapy Goal     OT, PT/OT Ongoing (interventions implemented as appropriate)    Description:  1.  PT WILL (S) WITH ALL LEVELS OF BED MOBILITY.  2.  PT WILL DON/DOFF SOCKS WITH MIN (A).  3.  PT WILL BE MIN (A) WITH EOB <> BSC T/F.  4.  PT WILL PERFORM X1SET X15-20 REPS OF RESISTANCE THERE EX TO INCREASE FX STRENGTH/ " ENDURANCE.                       Plan:  Patient to be seen 3 x/week to address the above listed problems via self-care/home management, therapeutic activities, therapeutic exercises  Plan of Care expires: 03/16/19  Plan of Care reviewed with: patient, grandchild(tim)         Lulu Boateng, OT  03/12/2019

## 2019-03-12 NOTE — SUBJECTIVE & OBJECTIVE
Interval History: need to discuss with family discharge planning    Review of Systems   Constitutional: Negative.    HENT: Negative.    Respiratory: Positive for cough and shortness of breath.    Endocrine: Negative.    Genitourinary: Negative.    Musculoskeletal: Negative.    Skin: Negative.    Allergic/Immunologic: Negative.    Neurological: Negative.    Hematological: Negative.       Objective:     Vital Signs (Most Recent):  Temp: 98.4 °F (36.9 °C) (03/12/19 0748)  Pulse: 67 (03/12/19 0754)  Resp: 20 (03/12/19 0754)  BP: (!) 146/65 (03/12/19 0748)  SpO2: (!) 93 % (03/12/19 0754) Vital Signs (24h Range):  Temp:  [97.7 °F (36.5 °C)-98.8 °F (37.1 °C)] 98.4 °F (36.9 °C)  Pulse:  [66-82] 67  Resp:  [18-28] 20  SpO2:  [92 %-96 %] 93 %  BP: (110-167)/(56-85) 146/65     Weight: 78.2 kg (172 lb 6.4 oz)  Body mass index is 27.06 kg/m².    Intake/Output Summary (Last 24 hours) at 3/12/2019 1029  Last data filed at 3/12/2019 0600  Gross per 24 hour   Intake 625 ml   Output 502 ml   Net 123 ml      Physical Exam   Constitutional: She appears well-developed. She has a sickly appearance.   HENT:   Head: Normocephalic and atraumatic.   Right Ear: External ear normal.   Left Ear: External ear normal.   Mouth/Throat: Oropharynx is clear and moist.   Eyes: EOM are normal. Pupils are equal, round, and reactive to light.   Neck: Normal range of motion. Neck supple.   Cardiovascular: Normal rate, regular rhythm, normal heart sounds and intact distal pulses.   Pulmonary/Chest: Effort normal. She has wheezes. She has rales.   Abdominal: Soft. Bowel sounds are normal.   Musculoskeletal: Normal range of motion.   Neurological: She is alert.   Skin: Skin is warm and dry.   Psychiatric: She has a normal mood and affect.   Nursing note and vitals reviewed.    Significant Labs: All pertinent labs within the past 24 hours have been reviewed.    Significant Imaging: I have reviewed all pertinent imaging results/findings within the past 24  hours.

## 2019-03-12 NOTE — PLAN OF CARE
Problem: Adult Inpatient Plan of Care  Goal: Plan of Care Review  Outcome: Ongoing (interventions implemented as appropriate)  POC reviewed with pt verbalized understanding  Pt remained free from fall, precautions in place  Pt is NSR on monitor 69-75  VS monitored  IV intact abx infusing at this time.   Not other complaints at this time. Call bell in belongings in reach, hourly rounding complete, reminded to call for assist will continue monitor.

## 2019-03-12 NOTE — PLAN OF CARE
Problem: Adult Inpatient Plan of Care  Goal: Plan of Care Review  Outcome: Ongoing (interventions implemented as appropriate)  Patient continues to be monitored and treated for acute and chronic conditions. Patient consistently turned Q2H and remains free of falls this shift. Safety precautions in place, including call light in reach. Will continue to monitor and treat.

## 2019-03-12 NOTE — PROGRESS NOTES
[facility] Hospital Medicine  Progress Note    Patient Name: Ashley Koenig  MRN: [id number]  Patient Class: IP- Inpatient   Admission Date: 3/6/2019  Length of Stay: 6 days  Attending Physician: Douglas Berman MD  Primary Care Provider: Maggy Ngo MD    Subjective:     Principal Problem:Aspiration pneumonia    HPI:  Ms. Koenig is a 89 yo fairly healthy white female who presented to the ED with her son complaining of SOB associated with a cough. Per the son this has been ongoing for about 1 week. Upon presentation she was found to be in some respiratory distress and hypoxia with oxygen saturation at 86%. She also was found down by family members but denies any LOC.     Chest Xray shows prominent size heart with interstitial opacities of both lungs with Carlee B-lines. Per the patient and her son there is no known history of CHF but he does give a history of PE which from history doesn't sound like it was provoked.    Hospital Course:  3/7/19: elderly female admitted yesterday with hypoxia and was thought to have decompensated diastolic dysfunction found this am very lethargic and ABG showed hypercapnia with Pco2 of 76. CTA ruled out PE but showed bilateral consolidation lungs    3/8/19: patient transferred to ICU yesterday and placed on Bipap but much more alert today. She was made DNR by her son. CXR is improved with some mild haziness of both lungs and small pleural effusion of left. Her WBC is trending down to 16K.    3/9/19: patient is doing much better and off BiPAP on nasal cannula and has been downgraded to tele but no beds. Her WBC back up today and abx changed to give better anaerobic coverage as she is aspirating and appreciate ST.    3/10/19: still with coughing her WBC now normal and clinically she looks better.    3/11/19 needs nocturnal bipap. Need to discuss with family about discharge planning.     Interval History: need to discuss with family discharge planning    Review of  Systems   Constitutional: Negative.    HENT: Negative.    Respiratory: Positive for cough and shortness of breath.    Endocrine: Negative.    Genitourinary: Negative.    Musculoskeletal: Negative.    Skin: Negative.    Allergic/Immunologic: Negative.    Neurological: Negative.    Hematological: Negative.       Objective:     Vital Signs (Most Recent):  Temp: 98.4 °F (36.9 °C) (03/12/19 0748)  Pulse: 67 (03/12/19 0754)  Resp: 20 (03/12/19 0754)  BP: (!) 146/65 (03/12/19 0748)  SpO2: (!) 93 % (03/12/19 0754) Vital Signs (24h Range):  Temp:  [97.7 °F (36.5 °C)-98.8 °F (37.1 °C)] 98.4 °F (36.9 °C)  Pulse:  [66-82] 67  Resp:  [18-28] 20  SpO2:  [92 %-96 %] 93 %  BP: (110-167)/(56-85) 146/65     Weight: 78.2 kg (172 lb 6.4 oz)  Body mass index is 27.06 kg/m².    Intake/Output Summary (Last 24 hours) at 3/12/2019 1029  Last data filed at 3/12/2019 0600  Gross per 24 hour   Intake 625 ml   Output 502 ml   Net 123 ml      Physical Exam   Constitutional: She appears well-developed. She has a sickly appearance.   HENT:   Head: Normocephalic and atraumatic.   Right Ear: External ear normal.   Left Ear: External ear normal.   Mouth/Throat: Oropharynx is clear and moist.   Eyes: EOM are normal. Pupils are equal, round, and reactive to light.   Neck: Normal range of motion. Neck supple.   Cardiovascular: Normal rate, regular rhythm, normal heart sounds and intact distal pulses.   Pulmonary/Chest: Effort normal. She has wheezes. She has rales.   Abdominal: Soft. Bowel sounds are normal.   Musculoskeletal: Normal range of motion.   Neurological: She is alert.   Skin: Skin is warm and dry.   Psychiatric: She has a normal mood and affect.   Nursing note and vitals reviewed.    Significant Labs: All pertinent labs within the past 24 hours have been reviewed.    Significant Imaging: I have reviewed all pertinent imaging results/findings within the past 24 hours.    Assessment/Plan:      * Aspiration pneumonia    She presented with  elevated WBC, HR, and hypoxia with no obvious source of infection only asymptomatic bacteruria which can be cause of her Leukocytosis but underlying PNA not completely excluded so will cover with Rocephin/Doxycycline    3/7/19: it appears she has underlying bilateral PNA  Continue current ans and f/u blood cultures    3/8/19: WBC trending down and cultures are negative  Resume current Abx    3/10/19: slowly improving as her WBC normal and diet has been adjusted  Continue abx    3/9/19: agree with switching antibiotics as her WBC increased today  BC's negative  Continue with current tx    3/11/19 plan as above     Acute cystitis without hematuria    She denies any symptoms but given her elevated WBC and UA cannot exclude as I feel her vitals and presentation related more to Respiratory.       Anxiety    Will resume her Xanax PRN       Acute on chronic diastolic congestive heart failure    She appears to be in acute failure but unclear if systolic/diastolic or combined dysfunction.  Will check 2D echo, she has been given lasix and from her history no known risk factors other than possible PNA vs PE  F/u echo and obtain stat CTA and given she is frail and has been given lasix will closely monitor her renal function as didn't want to obtain contrast but must given her history, empiric abx given she has an elevated WBC of 22K.    3/7/19: doubt if all CHF as she has diastolic dysfunction and my be mildly decompensated because of PNA. She was given IV lasix 20 mg in the ED yesterday afternoon and has but out about 600 cc of urine since last night. Her Troponin was slightly elevated but has remained stable on serial and no history of chest pain given so I suspect this was some mild demand ischemia from PNA. Echo eith normal LVF 60-65, Pulmonary HTN and diastolic dysfunction.    3/8/19: she did get a once dose of lasix on presentation but I feel she is clinically compensated     Acute hypoxemic respiratory failure    Given  her presentation she has been given supplemental oxygen with sats up to 93%  I and concerned she could possible have another PE as she had in 2014 which appears to have been unprovoked and at the time recommended long term anticoagulation.    3/7/19:  Resolved as she is hypercapnic and after reviewing previous records there is a history of COPD.  It appears she has bilateral PNA will consolidations seen on CTA and no PE.  -transfer to ICU and place on Bipap with repeat ABG in about 1 hour  -consult Pulmonology     3/8/19: still on BiPAP but will attempt to wean as she is now DNR and may not tolerate being off as discussion will be made with her son today. This is secondary to underlying PNA with possible underlying aspiration    3/9/19: still doing well off BiPAP and now on nasal cannula, monitor closely as she is a CO2 retainer.    3/10/19: on nasal cannula stable     3/11/19 plan as above     VTE Risk Mitigation (From admission, onward)        Ordered     enoxaparin injection 40 mg  Daily      03/06/19 1358     IP VTE HIGH RISK PATIENT  Once      03/06/19 1530     Place sequential compression device  Until discontinued      03/06/19 1530              Raji Young NP  Department of Hospital Medicine   [facility]

## 2019-03-12 NOTE — PT/OT/SLP PROGRESS
Physical Therapy  Treatment    Ashley Koenig   MRN: [id number]   Admitting Diagnosis: Aspiration pneumonia    PT Received On: 03/12/19  PT Start Time: 1250     PT Stop Time: 1315    PT Total Time (min): 25 min       Billable Minutes:  Gait Training 15 and Therapeutic Exercise 10    Treatment Type: Treatment  PT/PTA: PTA     PTA Visit Number: 2       General Precautions: Standard, fall  Orthopedic Precautions: N/A   Braces: N/A    Spiritual, Cultural Beliefs, Taoist Practices, Values that Affect Care: no    Subjective:  Communicated with Epic AND NURSEKATHY  prior to session.  PATIENT AGREE TO TX NOW.    Pain/Comfort  Pain Rating 1: 0/10    Objective:   Patient found with: peripheral IV, oxygen, telemetry, SCD, ASSISTED PATIENT OOB T/FS FOR GT ACTIVITY IN ROOM WITH O2 CONTIOUS, T/FS TO B/S CHAIR . PATIENT COUGHING ON AND OFF DURING  OOB T/FS,. PATIENT INSTRUCTED WITH DAKOTAH LE EXERCISES, MARCHING IN PLACE STANDING AT CGAX1 TO MIN ASSIST X1 WITH RW.    Functional Mobility:  Bed Mobility:     SUPINE TO SIT , SIT TO SUPINE AT MN ASSIST X1.  Transfers:   SIT TO STAND , STAND TO SIT AT MIN ASSIST X1.    Gait:    AMBULATE 20'X2 , VERY UNSTEADY AT MIN ASSIST X1, ASSIST WITH NEGOTIATING TURNS WITH RW.    Balance:   Static Sit: FAIR-: Maintains without assist but inconsistent   Dynamic Sit: FAIR: Cannot move trunk without losing balance  Static Stand: POOR+: Needs MINIMAL assist to maintain  Dynamic stand: POOR+: Needs MIN (minimal ) assist during gait     Therapeutic Activities and Exercises:  DAKOTAH LE EXERCISES, OOB T/FS TO B/S CHAIR , GT IN ROOM WITH RW.    AM-PAC 6 CLICK MOBILITY  How much help from another person does this patient currently need?   1 = Unable, Total/Dependent Assistance  2 = A lot, Maximum/Moderate Assistance  3 = A little, Minimum/Contact Guard/Supervision  4 = None, Modified [place]/Independent    Turning over in bed (including adjusting bedclothes, sheets and blankets)?: 3  Sitting down on and  standing up from a chair with arms (e.g., wheelchair, bedside commode, etc.): 3  Moving from lying on back to sitting on the side of the bed?: 3  Moving to and from a bed to a chair (including a wheelchair)?: 3  Need to walk in hospital room?: 3  Climbing 3-5 steps with a railing?: 1  Basic Mobility Total Score: 16    AM-PAC Raw Score CMS G-Code Modifier Level of Impairment Assistance   6 % Total / Unable   7 - 9 CM 80 - 100% Maximal Assist   10 - 14 CL 60 - 80% Moderate Assist   15 - 19 CK 40 - 60% Moderate Assist   20 - 22 CJ 20 - 40% Minimal Assist   23 CI 1-20% SBA / CGA   24 CH 0% Independent/ Mod I     Patient left up in chair with all lines intact and call button in reach.    Assessment:  Ashley Koenig is a 89 y.o. female with a medical diagnosis of Aspiration pneumonia .    Rehab identified problem list/impairments: Rehab identified problem list/impairments: weakness, gait instability, impaired endurance, impaired balance, impaired self care skills, impaired cognition, impaired functional mobilty    Rehab potential is excellent. PATIENT WITH GREAT EFFORTS AND APPEAR HIGHLY MOTIVATED TO INCREASE ACTIVITY LEVEL AS TOLERATED.    Activity tolerance: Excellent    Discharge recommendations: Discharge Facility/Level of Care Needs: nursing facility, skilled     Barriers to discharge:      Equipment recommendations: Equipment Needed After Discharge: commode     GOALS:   Multidisciplinary Problems     Physical Therapy Goals        Problem: Physical Therapy Goal    Goal Priority Disciplines Outcome Goal Variances Interventions   Physical Therapy Goal     PT, PT/OT Ongoing (interventions implemented as appropriate)     Description:  1. Patient will perform supine to sit with mod indep  2. Patient will perform sit to stand with RW min A to facilitate t/f's  3. Patient will amb >75ft RW vs 4wrw min A                    PLAN:    Patient to be seen 5 x/week  to address the above listed problems via gait training,  therapeutic activities, therapeutic exercises  Plan of Care expires: 03/16/19  Plan of Care reviewed with: patient, family         Mary Anne Jimmie, PTA  03/12/2019

## 2019-03-12 NOTE — NURSING
PIV discontinued. Tele discontinued. Discharge teaching complete. Questions answered. Prescriptions discussed. Patient discharging home with son via private vehicle and Home Health.

## 2019-03-12 NOTE — NURSING
Report called to Maggy at [facility]. Patient to discharge with 2 PIVs and her shine catheter. Family aware of plan and everyone is on board with the plan to discharge to [facility]. Patient picked up at 1630.

## 2019-03-12 NOTE — PLAN OF CARE
Son verbalized understanding of IMM     03/12/19 1209   Medicare Message   Important Message from Medicare regarding Discharge Appeal Rights Given to patient/caregiver;Explained to patient/caregiver;Other (comments)

## 2019-03-12 NOTE — PLAN OF CARE
03/12/19 1615   Final Note   Assessment Type Final Discharge Note   Anticipated Discharge Disposition Home-Health   Discharge plans and expectations educations in teach back method with documentation complete? Yes   Right Care Referral Info   Post Acute Recommendation Home-care  (To be assigned by [employer] [employer])

## 2019-03-13 ENCOUNTER — TELEPHONE (OUTPATIENT)
Dept: INTERNAL MEDICINE | Facility: CLINIC | Age: 84
End: 2019-03-13

## 2019-03-13 DIAGNOSIS — R53.81 DEBILITY: ICD-10-CM

## 2019-03-13 DIAGNOSIS — J96.01 ACUTE HYPOXEMIC RESPIRATORY FAILURE: ICD-10-CM

## 2019-03-13 DIAGNOSIS — J69.0 ASPIRATION PNEUMONIA OF BOTH LOWER LOBES, UNSPECIFIED ASPIRATION PNEUMONIA TYPE: ICD-10-CM

## 2019-03-13 DIAGNOSIS — I51.89 DIASTOLIC DYSFUNCTION: ICD-10-CM

## 2019-03-13 DIAGNOSIS — F03.90 DEMENTIA WITHOUT BEHAVIORAL DISTURBANCE, UNSPECIFIED DEMENTIA TYPE: Primary | Chronic | ICD-10-CM

## 2019-03-13 NOTE — TELEPHONE ENCOUNTER
Called and spoke with son. He said that he hasn't had any help and he is very frustrated because before discharge they were supposed to do some testing on her to see if he was going to need help with her. He said he cannot take of her alone she needs 24hr care. He said he wants some resolution today. He also said that home health has done nothing.

## 2019-03-13 NOTE — TELEPHONE ENCOUNTER
----- Message from Katbossman Velez sent at 3/13/2019  8:15 AM CDT -----  Contact: Albaro/son  Type:  Needs Medical Advice    Who Called: Albaro  Symptoms (please be specific): n/a  How long has patient had these symptoms:  n/a  Pharmacy name and phone #:  n/a  Would the patient rather a call back or a response via [facility]? Call back  Best Call Back Number: [phone] or [phone] cell  Additional Information: the patient was just released from the hospital with pneumonia. The patient isn't strong enough to do anything for herself. He stated she needs 24 hour care and wants to get some assistance. He stated that she can't even stand by herself.

## 2019-03-13 NOTE — TELEPHONE ENCOUNTER
Spoke with Son, Albaro.  He has been in contact with Rolanda, the [facility] Care manager. I also emailed her.  He is wanting impatient Skilled and rehab.  She asked for a doctors orders and notes.  I will send this to her now per his request.

## 2019-03-17 NOTE — PHYSICIAN QUERY
PT Name: Ashley Koenig  MR #: [id number]     Physician Query Form - Documentation Clarification      CDS/: Mignon Cornelius  RN CCDS             Contact information:[email or web address]    This form is a permanent document in the medical record.     Query Date: March 17, 2019    By submitting this query, we are merely seeking further clarification of documentation. Please utilize your independent clinical judgment when addressing the question(s) below.    The Medical record reflects the following:    Supporting Clinical Findings Location in Medical Record     Hx of clotting disorder    Aspirin 81 mg daily   ED,H&P-3/8    Per home meds     Acute hypoxemic respiratory failure     Given her presentation she has been given supplemental oxygen with sats up to 93%  I and concerned she could possible have another PE as she had in 2014 which appears to have been unprovoked and at the time recommended long term anticoagulation.              PN 3/8                                                                            Please further clarify the hx of clotting disorder.    Provider Use Only        [  x  ] History of unprovoked PE    [   ] Clotting defect-Thrombophilia describes a group of conditions (clotting disorders) in which there is an increased tendency for excessive blood clotting. Clotting disorders can be due to inherited genetic abnormalities that are associated with a life-long increased tendency to clot.    [    ]Other______________________                                                                                                                 [  ] Clinically Undetermined

## 2019-05-03 DIAGNOSIS — F41.1 GENERALIZED ANXIETY DISORDER: ICD-10-CM

## 2019-05-03 RX ORDER — ESCITALOPRAM OXALATE 20 MG/1
TABLET ORAL
Qty: 90 TABLET | Refills: 0 | Status: SHIPPED | OUTPATIENT
Start: 2019-05-03 | End: 2019-07-29 | Stop reason: SDUPTHER

## 2019-05-06 ENCOUNTER — OFFICE VISIT (OUTPATIENT)
Dept: INTERNAL MEDICINE | Facility: CLINIC | Age: 84
End: 2019-05-06
Payer: MEDICARE

## 2019-05-06 VITALS
BODY MASS INDEX: 27.06 KG/M2 | TEMPERATURE: 99 F | DIASTOLIC BLOOD PRESSURE: 58 MMHG | HEIGHT: 67 IN | SYSTOLIC BLOOD PRESSURE: 98 MMHG | WEIGHT: 172.38 LBS | HEART RATE: 85 BPM | OXYGEN SATURATION: 95 %

## 2019-05-06 DIAGNOSIS — J44.9 CHRONIC OBSTRUCTIVE PULMONARY DISEASE, UNSPECIFIED COPD TYPE: ICD-10-CM

## 2019-05-06 DIAGNOSIS — Z99.81 OXYGEN DEPENDENT: ICD-10-CM

## 2019-05-06 DIAGNOSIS — J69.0 ASPIRATION PNEUMONIA OF BOTH LOWER LOBES, UNSPECIFIED ASPIRATION PNEUMONIA TYPE: ICD-10-CM

## 2019-05-06 DIAGNOSIS — F03.90 DEMENTIA WITHOUT BEHAVIORAL DISTURBANCE, UNSPECIFIED DEMENTIA TYPE: Primary | Chronic | ICD-10-CM

## 2019-05-06 PROBLEM — N30.00 ACUTE CYSTITIS WITHOUT HEMATURIA: Status: RESOLVED | Noted: 2019-03-06 | Resolved: 2019-05-06

## 2019-05-06 PROCEDURE — 99214 PR OFFICE/OUTPT VISIT, EST, LEVL IV, 30-39 MIN: ICD-10-PCS | Mod: S$GLB,,, | Performed by: FAMILY MEDICINE

## 2019-05-06 PROCEDURE — 99999 PR PBB SHADOW E&M-EST. PATIENT-LVL III: CPT | Mod: PBBFAC,,, | Performed by: FAMILY MEDICINE

## 2019-05-06 PROCEDURE — 99499 RISK ADDL DX/OHS AUDIT: ICD-10-PCS | Mod: S$GLB,,, | Performed by: FAMILY MEDICINE

## 2019-05-06 PROCEDURE — 99999 PR PBB SHADOW E&M-EST. PATIENT-LVL III: ICD-10-PCS | Mod: PBBFAC,,, | Performed by: FAMILY MEDICINE

## 2019-05-06 PROCEDURE — 99214 OFFICE O/P EST MOD 30 MIN: CPT | Mod: S$GLB,,, | Performed by: FAMILY MEDICINE

## 2019-05-06 PROCEDURE — 99499 UNLISTED E&M SERVICE: CPT | Mod: S$GLB,,, | Performed by: FAMILY MEDICINE

## 2019-05-06 RX ORDER — IPRATROPIUM BROMIDE AND ALBUTEROL SULFATE 2.5; .5 MG/3ML; MG/3ML
3 SOLUTION RESPIRATORY (INHALATION) EVERY 6 HOURS PRN
Qty: 1 BOX | Refills: 11 | Status: SHIPPED | OUTPATIENT
Start: 2019-05-06 | End: 2019-05-07 | Stop reason: SDUPTHER

## 2019-05-06 NOTE — LETTER
[facility]  [id number] [place] [address]  Phone: [phone]  Fax: [phone]     2019  Ashleyyue Koenig  : 1926      To whom it may concern,     Patient has been under my care since 2017.  I have been asked to draft a letter stating that the patient does have dementia.  Her primary care taker is her son. She is unable to take care of her affairs due to her dementia.       If you have any questions, please contact me.      Thank you,      Dr. Maggy Ngo  Board Certified Family Medicine   [phone]

## 2019-05-07 ENCOUNTER — TELEPHONE (OUTPATIENT)
Dept: INTERNAL MEDICINE | Facility: CLINIC | Age: 84
End: 2019-05-07

## 2019-05-07 DIAGNOSIS — I51.89 DIASTOLIC DYSFUNCTION: ICD-10-CM

## 2019-05-07 DIAGNOSIS — Z99.81 OXYGEN DEPENDENT: ICD-10-CM

## 2019-05-07 DIAGNOSIS — F03.90 DEMENTIA WITHOUT BEHAVIORAL DISTURBANCE, UNSPECIFIED DEMENTIA TYPE: Primary | ICD-10-CM

## 2019-05-07 DIAGNOSIS — J44.9 CHRONIC OBSTRUCTIVE PULMONARY DISEASE, UNSPECIFIED COPD TYPE: ICD-10-CM

## 2019-05-07 DIAGNOSIS — J69.0 ASPIRATION PNEUMONIA OF BOTH LOWER LOBES, UNSPECIFIED ASPIRATION PNEUMONIA TYPE: Primary | ICD-10-CM

## 2019-05-07 DIAGNOSIS — H91.90 HEARING LOSS, UNSPECIFIED HEARING LOSS TYPE, UNSPECIFIED LATERALITY: ICD-10-CM

## 2019-05-07 DIAGNOSIS — R13.12 OROPHARYNGEAL DYSPHAGIA: ICD-10-CM

## 2019-05-07 DIAGNOSIS — J69.0 ASPIRATION PNEUMONIA OF BOTH LOWER LOBES, UNSPECIFIED ASPIRATION PNEUMONIA TYPE: ICD-10-CM

## 2019-05-07 PROBLEM — F41.9 ANXIETY: Status: RESOLVED | Noted: 2019-03-06 | Resolved: 2019-05-07

## 2019-05-07 PROBLEM — J96.01 ACUTE HYPOXEMIC RESPIRATORY FAILURE: Status: RESOLVED | Noted: 2019-03-06 | Resolved: 2019-05-07

## 2019-05-07 PROBLEM — I50.33 ACUTE ON CHRONIC DIASTOLIC CONGESTIVE HEART FAILURE: Status: RESOLVED | Noted: 2019-03-06 | Resolved: 2019-05-07

## 2019-05-07 RX ORDER — IPRATROPIUM BROMIDE AND ALBUTEROL SULFATE 2.5; .5 MG/3ML; MG/3ML
3 SOLUTION RESPIRATORY (INHALATION) EVERY 6 HOURS PRN
Qty: 1 BOX | Refills: 11 | Status: SHIPPED | OUTPATIENT
Start: 2019-05-07 | End: 2019-07-09

## 2019-05-07 NOTE — TELEPHONE ENCOUNTER
Called and spoke with patient son. He mentioned [employer] system was down and has been down. He asked if you could send the nebulizer solution to [facility] on [address]

## 2019-05-07 NOTE — TELEPHONE ENCOUNTER
Called and spoke with HH. She said she will try to do it with a verbal but will probably need some order because of her insurance.

## 2019-05-07 NOTE — TELEPHONE ENCOUNTER
Notify son, I have reviewed the records he had given me.  He can have them back, (please copy for chart).    She need puree diet and thickened with nector.  We will consult Speech with [facility] to assist in swallow as her notes say she remains at high risk of aspiration and recurrent pneumonia.    I would like to set her up with Pulm.  She remains on oxygen, and we need to continue to monitor her oxygen status.  I have placed referral. Please schedule    She is to see me in 2 months.    Home health PT, OT, Speech should be coming out.  He can look into [facility] company as resource. I will also place referral to [facility] again to see if they can assist with Network providers for home care.

## 2019-05-07 NOTE — TELEPHONE ENCOUNTER
----- Message from John Babin sent at 5/7/2019  9:25 AM CDT -----  Contact: [facility]  Type:  Needs Medical Advice    Who Called:DJ  Symptoms (please be specific):  How long has patient had these symptoms:   Pharmacy name and phone #:    Would the patient rather a call back or a response via [facility]? Call   Best Call Back Number:   [phone] fax [phone]  Additional Information: caller is requesting a call back from the nurse in regards to them needing an order for the pt to get a nebulizer mask

## 2019-05-08 ENCOUNTER — OFFICE VISIT (OUTPATIENT)
Dept: OTOLARYNGOLOGY | Facility: CLINIC | Age: 84
End: 2019-05-08
Payer: MEDICARE

## 2019-05-08 DIAGNOSIS — H91.90 PERCEIVED HEARING CHANGES: ICD-10-CM

## 2019-05-08 DIAGNOSIS — H90.3 SENSORINEURAL HEARING LOSS (SNHL) OF BOTH EARS: ICD-10-CM

## 2019-05-08 DIAGNOSIS — T16.1XXA FOREIGN BODY OF RIGHT EAR, INITIAL ENCOUNTER: Primary | ICD-10-CM

## 2019-05-08 PROCEDURE — 99213 PR OFFICE/OUTPT VISIT, EST, LEVL III, 20-29 MIN: ICD-10-PCS | Mod: 25,S$GLB,, | Performed by: PHYSICIAN ASSISTANT

## 2019-05-08 PROCEDURE — 69200 CLEAR OUTER EAR CANAL: CPT | Mod: RT,S$GLB,, | Performed by: PHYSICIAN ASSISTANT

## 2019-05-08 PROCEDURE — 69200 PR REMV EXT CANAL FOREIGN BODY: ICD-10-PCS | Mod: RT,S$GLB,, | Performed by: PHYSICIAN ASSISTANT

## 2019-05-08 PROCEDURE — 99999 PR PBB SHADOW E&M-EST. PATIENT-LVL III: ICD-10-PCS | Mod: PBBFAC,,, | Performed by: PHYSICIAN ASSISTANT

## 2019-05-08 PROCEDURE — 99999 PR PBB SHADOW E&M-EST. PATIENT-LVL III: CPT | Mod: PBBFAC,,, | Performed by: PHYSICIAN ASSISTANT

## 2019-05-08 PROCEDURE — 99213 OFFICE O/P EST LOW 20 MIN: CPT | Mod: 25,S$GLB,, | Performed by: PHYSICIAN ASSISTANT

## 2019-05-08 NOTE — PROGRESS NOTES
Subjective:       Patient ID: Ashley Koenig is a 89 y.o. female.    Chief Complaint: Ear Fullness    Patient is a very pleasant 89 y.o. female here to see me today for the first time for evaluation of hearing loss.  She has known hearing loss and has worn hearing aids for many years (bought in [place]).  Her RIGHT ear is usually her better hearing ear per her son Albaro.  She was recently hospitalized for pneumonia last month and then discharged to a nursing home for several weeks.  Since hospitalization, she's not been hearing as well from her right ear.  She was recently told that her right ear was full of wax again.  She was last here for ear cleaning with Monica Wilson PA-C in 2/2019.  She denies any recent ear pain or drainage.  She has not had any previous otologic surgery.  They have recently used Debrox to flush her right ear with no results.    Review of Systems   Constitutional: Negative for fever.   HENT: Positive for hearing loss. Negative for congestion, ear discharge, ear pain, postnasal drip and rhinorrhea.    Respiratory: Positive for cough (recent pneumonia).         On supplemental oxygen   Neurological: Negative for dizziness.   Psychiatric/Behavioral: Positive for confusion (dementia).       Objective:      Physical Exam   Constitutional: She is oriented to person, place, and time. She appears well-developed and well-nourished. No distress.   HENT:   Head: Normocephalic and atraumatic.   Right Ear: External ear normal.   Left Ear: Tympanic membrane, external ear and ear canal normal.   Nose: Nose normal. No mucosal edema or rhinorrhea.   Mouth/Throat: Uvula is midline, oropharynx is clear and moist and mucous membranes are normal. Mucous membranes are not pale and not dry. No oropharyngeal exudate or posterior oropharyngeal erythema.   Foreign body in RIGHT EAC (removal described below); wearing nasal cannula/supplemental oxygen   Eyes: Pupils are equal, round, and reactive to light.  Conjunctivae, EOM and lids are normal. Right eye exhibits no chemosis. Left eye exhibits no chemosis. Right conjunctiva is not injected. Left conjunctiva is not injected. No scleral icterus. Right eye exhibits normal extraocular motion and no nystagmus. Left eye exhibits normal extraocular motion and no nystagmus.   Neck: Trachea normal and phonation normal. No tracheal tenderness present. No tracheal deviation present. No thyroid mass and no thyromegaly present.   Cardiovascular: Intact distal pulses.   Pulmonary/Chest: Effort normal. No stridor. No respiratory distress.   Abdominal: She exhibits no distension.   Lymphadenopathy:        Head (right side): No submental, no submandibular, no preauricular and no posterior auricular adenopathy present.        Head (left side): No submental, no submandibular, no preauricular and no posterior auricular adenopathy present.     She has no cervical adenopathy.   Neurological: She is alert and oriented to person, place, and time. No cranial nerve deficit.   Skin: Skin is warm and dry. No rash noted. No erythema.   Psychiatric: She has a normal mood and affect. Her behavior is normal.         PROCEDURE NOTE:  Removal of foreign body from ear canal  Preprocedure diagnosis:  Foreign body ear canal  Postprocedure diangosis:  Same (hearing aid button battery)    Procedure in detail:  After verbal consent was obtained, the binocular operating microscope was used to visualize the foreign body and ear canal.  The object was carefully grasped using an alligator forcep as well as a curette as necessary.  It was removed from the ear canal without difficulty.  The canal and tympanic membrane were then inspected, and were found to be intact with no abrasions or lacerations. The patient tolerated the procedure well, and there were no significant complications.        Assessment:       1. Foreign body of right ear, initial encounter    2. Perceived hearing changes    3. Sensorineural  hearing loss (SNHL) of both ears        Plan:         Removed button battery from her right ear canal today without difficulty.  No breakdown or erosion of the underlying skin/canal.  She has hearing aids from [place] that are no longer working as well for her.  I would recommend scheduling an audiogram for further evaluation and I'll review those results once available.  Her son says they'll have to return another day for audio.  Discussed possibility of a sudden SNHL which can occur following after URI/infection.

## 2019-05-13 ENCOUNTER — OFFICE VISIT (OUTPATIENT)
Dept: PULMONOLOGY | Facility: CLINIC | Age: 84
End: 2019-05-13
Payer: MEDICARE

## 2019-05-13 VITALS
SYSTOLIC BLOOD PRESSURE: 115 MMHG | BODY MASS INDEX: 26.52 KG/M2 | WEIGHT: 165 LBS | HEIGHT: 66 IN | HEART RATE: 93 BPM | RESPIRATION RATE: 18 BRPM | DIASTOLIC BLOOD PRESSURE: 62 MMHG | OXYGEN SATURATION: 95 %

## 2019-05-13 DIAGNOSIS — J69.0 ASPIRATION PNEUMONIA OF BOTH LOWER LOBES, UNSPECIFIED ASPIRATION PNEUMONIA TYPE: Primary | ICD-10-CM

## 2019-05-13 DIAGNOSIS — Z99.81 REQUIRES SUPPLEMENTAL OXYGEN: ICD-10-CM

## 2019-05-13 DIAGNOSIS — R13.12 DYSPHAGIA, OROPHARYNGEAL: ICD-10-CM

## 2019-05-13 DIAGNOSIS — R91.1 LUNG NODULE: Chronic | ICD-10-CM

## 2019-05-13 DIAGNOSIS — R05.9 COUGH: ICD-10-CM

## 2019-05-13 PROCEDURE — 99999 PR PBB SHADOW E&M-EST. PATIENT-LVL III: ICD-10-PCS | Mod: PBBFAC,,, | Performed by: INTERNAL MEDICINE

## 2019-05-13 PROCEDURE — 1100F PTFALLS ASSESS-DOCD GE2>/YR: CPT | Mod: CPTII,S$GLB,, | Performed by: INTERNAL MEDICINE

## 2019-05-13 PROCEDURE — 99499 RISK ADDL DX/OHS AUDIT: ICD-10-PCS | Mod: S$GLB,,, | Performed by: INTERNAL MEDICINE

## 2019-05-13 PROCEDURE — 99215 PR OFFICE/OUTPT VISIT, EST, LEVL V, 40-54 MIN: ICD-10-PCS | Mod: S$GLB,,, | Performed by: INTERNAL MEDICINE

## 2019-05-13 PROCEDURE — 3288F FALL RISK ASSESSMENT DOCD: CPT | Mod: CPTII,S$GLB,, | Performed by: INTERNAL MEDICINE

## 2019-05-13 PROCEDURE — 99499 UNLISTED E&M SERVICE: CPT | Mod: S$GLB,,, | Performed by: INTERNAL MEDICINE

## 2019-05-13 PROCEDURE — 1100F PR PT FALLS ASSESS DOC 2+ FALLS/FALL W/INJURY/YR: ICD-10-PCS | Mod: CPTII,S$GLB,, | Performed by: INTERNAL MEDICINE

## 2019-05-13 PROCEDURE — 99999 PR PBB SHADOW E&M-EST. PATIENT-LVL III: CPT | Mod: PBBFAC,,, | Performed by: INTERNAL MEDICINE

## 2019-05-13 PROCEDURE — 99215 OFFICE O/P EST HI 40 MIN: CPT | Mod: S$GLB,,, | Performed by: INTERNAL MEDICINE

## 2019-05-13 PROCEDURE — 3288F PR FALLS RISK ASSESSMENT DOCUMENTED: ICD-10-PCS | Mod: CPTII,S$GLB,, | Performed by: INTERNAL MEDICINE

## 2019-05-13 NOTE — LETTER
May 13, 2019      Maggy Ngo MD  [address] [place] [id number]           [facility]  [address] [place] [id number]  Phone: [phone]  Fax: [phone]          Patient: Ashley Koenig   MR Number: [id number]   YOB: 1926   Date of Visit: 5/13/2019       Dear Dr. Maggy Ngo:    Thank you for referring Ashley Koenig to me for evaluation. Attached you will find relevant portions of my assessment and plan of care.    If you have questions, please do not hesitate to call me. I look forward to following Ashley Koenig along with you.    Sincerely,    Ryan Johnson MD    Enclosure  CC:  No Recipients    If you would like to receive this communication electronically, please contact [email or web address] or [phone] to request more information on [facility] [employer] access.    For providers and/or their staff who would like to refer a patient to [facility], please contact us through our one-stop-shop provider referral line, [facility], at [phone].    If you feel you have received this communication in error or would no longer like to receive these types of communications, please e-mail [email or web address]

## 2019-05-13 NOTE — PATIENT INSTRUCTIONS
Lung Anatomy  Your lungs take air in to give your body oxygen, which the body needs to work. Your lungs, like all the tissues in your body, are made up of billions of tiny specialized cells. Old lung cells die and are replaced by new, identical lung cells. This natural process helps ensure healthy lungs.    Date Last Reviewed: 11/1/2016  © 8043-0455 [email or web address]. [address] [place] [address], [place] [address]. All rights reserved. This information is not intended as a substitute for professional medical care. Always follow your healthcare professional's instructions.

## 2019-05-13 NOTE — ASSESSMENT & PLAN NOTE
Continue oxygen supplementation.   Discontinue MUCINEX.  CT chest without contrast in 6 - 8 weeks.

## 2019-05-13 NOTE — PROGRESS NOTES
Post hospital discharge pulmonary follow up note      Ashley Koenig is a 89 y.o. female    ADMIT DATE: 3/6/19    DISCHARGE DATE: 3/12/19     ADMISSION DIAGNOSIS: Bilateral pneumonia (secondary to aspiration),  ADCHF    DISCHARGE DIAGNOSIS: Same    Procedures Performed During Admission: SLP swallow evaluation; ECHO     Treatment at Discharge: .       Medication List           Accurate as of 5/13/19  3:21 PM. If you have any questions, ask your nurse or doctor.               CONTINUE taking these medications    albuterol-ipratropium 2.5 mg-0.5 mg/3 mL nebulizer solution  Commonly known as:  DUO-NEB  Take 3 mLs by nebulization every 6 (six) hours as needed for Wheezing. Rescue     aspirin 81 MG EC tablet  Commonly known as:  ECOTRIN  Take 1 tablet (81 mg total) by mouth once daily.     escitalopram oxalate 20 MG tablet  Commonly known as:  LEXAPRO  TAKE 1 TABLET ONCE DAILY     niacin 100 MG Tab     VITAMINS AND MINERALS ORAL     XANAX 0.25 MG tablet  Generic drug:  ALPRAZolam            She was discharged to the [facility] for 3 - 4 weeks post admission.  Has completed 3 weeks of therapy.   Currently starting home therapy.   She reports that she is 75 % improved.     Problem list     Patient Active Problem List   Diagnosis    Meningioma    History of pulmonary embolism    Coronary artery disease involving native coronary artery of native heart without angina pectoris    Lung nodule    Multiple thyroid nodules    Anxiety and depression    Atherosclerosis of aorta    Hyperlipidemia    Diastolic dysfunction    Macular degeneration    Vitamin D deficiency    BPPV (benign paroxysmal positional vertigo)    Hearing loss    Cough    Dementia    Aspiration pneumonia    Requires supplemental oxygen    Dysphagia, oropharyngeal       Problem list has been reviewed.    Subjective:       HPI    Accompanied buy son today.    Patients reports NYHA II dyspnea    The patient does not have currently have  symptoms / an exacerbation.       Persistent non productive cough.       Swallow evaluation at the Nursing Home was unremarkable per her son who accompanies her.      Her current respiratory regimen: DUONEB : She does use medications compliantly.       COPD QUESTIONNAIRE  How often do you cough?: A little of the time  How often do you have phlegm (mucus) in your chest?: A little of the time  How often does your chest feel tight?: Never  When you walk up a hill or one flight of stairs, how often are you breathless?: All of the time  How often are you limited doing any activities at home?: All of the time  How often are you confident leaving the house despite your lung condition?: Almost Never  How often do you sleep soundly?: All of the time  How often do you have energy?: Never  Total score: 23       She reports chronic dyspnea with exertion which is stable and unchanged. Patient denies fevers, chills rigors, chest pain, pleurisy, hemoptysis, family history of asthma, childhood history of asthma. She is a lifetime non smoker.    A full  review of systems, past , family  and social histories was performed except as mentioned in the note above, these are non contributory to the main issues discussed today.      Review of Systems   Constitutional: Negative for chills and weight loss.   HENT: Positive for hearing loss.    Eyes: Negative for discharge.   Respiratory: Positive for cough. Negative for wheezing.    Cardiovascular: Negative for chest pain and palpitations.   Gastrointestinal: Negative for blood in stool, constipation, diarrhea and vomiting.   Genitourinary: Negative for dysuria and hematuria.   Musculoskeletal: Negative for neck pain.   Neurological: Negative for weakness and headaches.   Endo/Heme/Allergies: Negative for polydipsia.   Psychiatric/Behavioral: Positive for memory loss.       Objective:      Vitals:    05/13/19 1439   BP: 115/62   Pulse: 93   Resp: 18   SpO2: 95%   Weight: 74.8 kg (165 lb)  "  Height: 5' 6" (1.676 m)   PainSc: 0-No pain     Body mass index is 26.63 kg/m².    Physical Exam   Constitutional: She is oriented to person, place, and time. She appears well-developed and well-nourished. No distress.   HENT:   Head: Normocephalic and atraumatic.   Left Ear: Tympanic membrane and ear canal normal.   Nose: No mucosal edema or rhinorrhea.   Mouth/Throat: Uvula is midline and mucous membranes are normal. Mucous membranes are not pale and not dry. No oropharyngeal exudate or posterior oropharyngeal erythema.   Eyes: Pupils are equal, round, and reactive to light. Conjunctivae, EOM and lids are normal. Right eye exhibits no chemosis. Left eye exhibits no chemosis. Right conjunctiva is not injected. Left conjunctiva is not injected. No scleral icterus. Right eye exhibits normal extraocular motion and no nystagmus. Left eye exhibits normal extraocular motion and no nystagmus.   Neck: Trachea normal and phonation normal. No tracheal tenderness present. No tracheal deviation present. No thyroid mass and no thyromegaly present.   Cardiovascular: Intact distal pulses.   Pulmonary/Chest: Effort normal. No stridor. No respiratory distress. She has rales.   Abdominal: She exhibits no distension.   Musculoskeletal: Normal range of motion. She exhibits no edema.   Neurological: She is alert and oriented to person, place, and time. No cranial nerve deficit.   Skin: Skin is warm and dry. Capillary refill takes less than 2 seconds. No rash noted. No erythema.   Psychiatric: She has a normal mood and affect. Her behavior is normal.        CTA CHEST NON CORONARY: 03/06/19:     No CT evidence of pulmonary embolism.  Motion artifact obscures detail.  Patchy areas of consolidation in the lung bases right greater than left which may represent pneumonia with additional area of consolidation in the medial right middle lobe.  Follow-up recommended.    Lab Results   Component Value Date     03/12/2019    K 4.2 " 03/12/2019    CL 94 (L) 03/12/2019    CO2 38 (H) 03/12/2019    BUN 17 03/12/2019    CREATININE 0.8 03/12/2019    CALCIUM 9.3 03/12/2019     Lab Results   Component Value Date    TROPONINI 0.046 (H) 03/06/2019     Lab Results   Component Value Date    WBC 13.17 (H) 03/12/2019    HGB 12.5 03/12/2019    HCT 41.1 03/12/2019     (H) 03/12/2019     03/12/2019           Results for orders placed or performed during the hospital encounter of 03/06/19   2D echo with color flow doppler   Result Value Ref Range    QEF 60 55 - 65    Mitral Valve Regurgitation TRIVIAL     Diastolic Dysfunction Yes (A)     Aortic Valve Regurgitation MILD     Est. PA Systolic Pressure 40.7 (A)     Tricuspid Valve Regurgitation TRIVIAL      Assessment / Plan :       Problem List Items Addressed This Visit        Pulmonary    Lung nodule (Chronic)    Overview     3/27/15 Care EverywhereCTA chest: Soft tissue nodule in the central right middle lobe lateral  segment measuring 14 x 9 mm with subtle spiculations, but rather low  soft tissue density. While the lesion could be inflammatory, the  possibility of a small bronchogenic neoplasm is considered. If the  lesion is new or prior studies are unavailable for comparison,  consider PET CT for further evaluation.  4.  Additional tubular opacity in the anterobasal right lower lobe  with peripheral tree-in-bud opacities is felt to be inflammatory,  specifically a focal mucocele.Soft tissue nodule in the central right middle lobe lateral  segment measuring 14 x 9 mm with subtle spiculations, but rather low  soft tissue density. While the lesion could be inflammatory, the  possibility of a small bronchogenic neoplasm is considered. If the  lesion is new or prior studies are unavailable for comparison,  consider PET CT for further evaluation.  4.  Additional tubular opacity in the anterobasal right lower lobe  with peripheral tree-in-bud opacities is felt to be inflammatory,  specifically a  focal mucocele.         Current Assessment & Plan     Continue radiologic monitoring.          Cough    Current Assessment & Plan     OTC antitussives.   Continue DUONEB as prescribed.   Spirometry in 6 months.          Relevant Orders    Spirometry without Bronchodilator    Aspiration pneumonia - Primary    Current Assessment & Plan     Continue oxygen supplementation.   Discontinue MUCINEX.  CT chest without contrast in 6 - 8 weeks.          Relevant Orders    CT Chest Without Contrast    Requires supplemental oxygen    Current Assessment & Plan     Continue oxygen supplementation at 2.5 L /min.     6MWT in 6 - 8 weeks.          Relevant Orders    Six Minute Walk Test to qualify for Home Oxygen       GI    Dysphagia, oropharyngeal    Current Assessment & Plan       Continue pureed diet with nectar thick liquids diet.   Aspiration precautions                   TIME SPENT WITH PATIENT: Time spent: 50 minutes in face to face  discussion concerning diagnosis, prognosis, review of lab and test results, benefits of treatment as well as management of disease, counseling of patient and coordination of care between various health  care providers . Greater than half the time spent was used for coordination of care and counseling of patient.     Follow up in about 6 weeks (around 6/24/2019) for Cough, Lung Nodule, Pneumonia, Chronic Respiratory Failure.

## 2019-05-21 ENCOUNTER — SSC ENCOUNTER (OUTPATIENT)
Dept: ADMINISTRATIVE | Facility: OTHER | Age: 84
End: 2019-05-21

## 2019-05-21 NOTE — PROGRESS NOTES
Please note the following patient's information was forwarded to [facility] ([facility]) for case management and/or  on 05/21/2019.    Please see the media section of patient's chart for additional details.    Please contact Ext. [phone] with any questions.    Thank you,    Helen Blanco, [facility]  Outpatient Case Mgmnt  [phone]

## 2019-06-26 ENCOUNTER — DOCUMENTATION ONLY (OUTPATIENT)
Dept: ADMINISTRATIVE | Facility: HOSPITAL | Age: 84
End: 2019-06-26

## 2019-07-10 ENCOUNTER — CLINICAL SUPPORT (OUTPATIENT)
Dept: PULMONOLOGY | Facility: CLINIC | Age: 84
End: 2019-07-10
Payer: MEDICARE

## 2019-07-10 ENCOUNTER — HOSPITAL ENCOUNTER (OUTPATIENT)
Dept: RADIOLOGY | Facility: HOSPITAL | Age: 84
Discharge: HOME | End: 2019-07-10
Attending: INTERNAL MEDICINE
Payer: MEDICARE

## 2019-07-10 ENCOUNTER — OFFICE VISIT (OUTPATIENT)
Dept: PULMONOLOGY | Facility: CLINIC | Age: 84
End: 2019-07-10
Payer: MEDICARE

## 2019-07-10 ENCOUNTER — OFFICE VISIT (OUTPATIENT)
Dept: INTERNAL MEDICINE | Facility: CLINIC | Age: 84
End: 2019-07-10
Payer: MEDICARE

## 2019-07-10 VITALS
HEIGHT: 66 IN | WEIGHT: 155 LBS | DIASTOLIC BLOOD PRESSURE: 70 MMHG | BODY MASS INDEX: 24.98 KG/M2 | HEIGHT: 66 IN | WEIGHT: 155.44 LBS | BODY MASS INDEX: 24.91 KG/M2 | RESPIRATION RATE: 18 BRPM | OXYGEN SATURATION: 98 % | SYSTOLIC BLOOD PRESSURE: 140 MMHG | HEART RATE: 80 BPM

## 2019-07-10 VITALS
BODY MASS INDEX: 25.01 KG/M2 | HEIGHT: 66 IN | HEART RATE: 66 BPM | WEIGHT: 155.63 LBS | SYSTOLIC BLOOD PRESSURE: 130 MMHG | DIASTOLIC BLOOD PRESSURE: 54 MMHG | OXYGEN SATURATION: 94 % | TEMPERATURE: 99 F

## 2019-07-10 DIAGNOSIS — D32.9 MENINGIOMA: ICD-10-CM

## 2019-07-10 DIAGNOSIS — F41.9 ANXIETY AND DEPRESSION: ICD-10-CM

## 2019-07-10 DIAGNOSIS — R05.9 COUGH: ICD-10-CM

## 2019-07-10 DIAGNOSIS — H91.90 HEARING LOSS, UNSPECIFIED HEARING LOSS TYPE, UNSPECIFIED LATERALITY: ICD-10-CM

## 2019-07-10 DIAGNOSIS — Z99.81 REQUIRES SUPPLEMENTAL OXYGEN: ICD-10-CM

## 2019-07-10 DIAGNOSIS — Z86.711 HISTORY OF PULMONARY EMBOLISM: Primary | Chronic | ICD-10-CM

## 2019-07-10 DIAGNOSIS — J69.0 ASPIRATION PNEUMONIA OF BOTH LOWER LOBES, UNSPECIFIED ASPIRATION PNEUMONIA TYPE: ICD-10-CM

## 2019-07-10 DIAGNOSIS — F03.90 DEMENTIA WITHOUT BEHAVIORAL DISTURBANCE, UNSPECIFIED DEMENTIA TYPE: Primary | Chronic | ICD-10-CM

## 2019-07-10 DIAGNOSIS — R13.12 DYSPHAGIA, OROPHARYNGEAL: ICD-10-CM

## 2019-07-10 DIAGNOSIS — R13.12 DYSPHAGIA, OROPHARYNGEAL: Chronic | ICD-10-CM

## 2019-07-10 DIAGNOSIS — F32.A ANXIETY AND DEPRESSION: ICD-10-CM

## 2019-07-10 PROBLEM — R91.1 LUNG NODULE: Chronic | Status: RESOLVED | Noted: 2017-09-12 | Resolved: 2019-07-10

## 2019-07-10 PROCEDURE — 3288F PR FALLS RISK ASSESSMENT DOCUMENTED: ICD-10-PCS | Mod: CPTII,S$GLB,, | Performed by: FAMILY MEDICINE

## 2019-07-10 PROCEDURE — 99499 RISK ADDL DX/OHS AUDIT: ICD-10-PCS | Mod: S$GLB,,, | Performed by: FAMILY MEDICINE

## 2019-07-10 PROCEDURE — 99499 UNLISTED E&M SERVICE: CPT | Mod: S$GLB,,, | Performed by: FAMILY MEDICINE

## 2019-07-10 PROCEDURE — 1100F PTFALLS ASSESS-DOCD GE2>/YR: CPT | Mod: CPTII,S$GLB,, | Performed by: INTERNAL MEDICINE

## 2019-07-10 PROCEDURE — 99999 PR PBB SHADOW E&M-EST. PATIENT-LVL III: ICD-10-PCS | Mod: PBBFAC,,, | Performed by: INTERNAL MEDICINE

## 2019-07-10 PROCEDURE — 99999 PR PBB SHADOW E&M-EST. PATIENT-LVL I: CPT | Mod: PBBFAC,,,

## 2019-07-10 PROCEDURE — 1100F PTFALLS ASSESS-DOCD GE2>/YR: CPT | Mod: CPTII,S$GLB,, | Performed by: FAMILY MEDICINE

## 2019-07-10 PROCEDURE — 3288F FALL RISK ASSESSMENT DOCD: CPT | Mod: CPTII,S$GLB,, | Performed by: FAMILY MEDICINE

## 2019-07-10 PROCEDURE — 3288F PR FALLS RISK ASSESSMENT DOCUMENTED: ICD-10-PCS | Mod: CPTII,S$GLB,, | Performed by: INTERNAL MEDICINE

## 2019-07-10 PROCEDURE — 99215 OFFICE O/P EST HI 40 MIN: CPT | Mod: 25,S$GLB,, | Performed by: INTERNAL MEDICINE

## 2019-07-10 PROCEDURE — 99999 PR PBB SHADOW E&M-EST. PATIENT-LVL III: CPT | Mod: PBBFAC,,, | Performed by: INTERNAL MEDICINE

## 2019-07-10 PROCEDURE — 1100F PR PT FALLS ASSESS DOC 2+ FALLS/FALL W/INJURY/YR: ICD-10-PCS | Mod: CPTII,S$GLB,, | Performed by: INTERNAL MEDICINE

## 2019-07-10 PROCEDURE — 99999 PR PBB SHADOW E&M-EST. PATIENT-LVL III: CPT | Mod: PBBFAC,,, | Performed by: FAMILY MEDICINE

## 2019-07-10 PROCEDURE — 71250 CT THORAX DX C-: CPT | Mod: TC

## 2019-07-10 PROCEDURE — 99999 PR PBB SHADOW E&M-EST. PATIENT-LVL I: ICD-10-PCS | Mod: PBBFAC,,,

## 2019-07-10 PROCEDURE — 94618 PULMONARY STRESS TESTING: ICD-10-PCS | Mod: S$GLB,,, | Performed by: INTERNAL MEDICINE

## 2019-07-10 PROCEDURE — 99999 PR PBB SHADOW E&M-EST. PATIENT-LVL III: ICD-10-PCS | Mod: PBBFAC,,, | Performed by: FAMILY MEDICINE

## 2019-07-10 PROCEDURE — 99215 PR OFFICE/OUTPT VISIT, EST, LEVL V, 40-54 MIN: ICD-10-PCS | Mod: 25,S$GLB,, | Performed by: INTERNAL MEDICINE

## 2019-07-10 PROCEDURE — 99214 PR OFFICE/OUTPT VISIT, EST, LEVL IV, 30-39 MIN: ICD-10-PCS | Mod: S$GLB,,, | Performed by: FAMILY MEDICINE

## 2019-07-10 PROCEDURE — 3288F FALL RISK ASSESSMENT DOCD: CPT | Mod: CPTII,S$GLB,, | Performed by: INTERNAL MEDICINE

## 2019-07-10 PROCEDURE — 94618 PULMONARY STRESS TESTING: CPT | Mod: S$GLB,,, | Performed by: INTERNAL MEDICINE

## 2019-07-10 PROCEDURE — 1100F PR PT FALLS ASSESS DOC 2+ FALLS/FALL W/INJURY/YR: ICD-10-PCS | Mod: CPTII,S$GLB,, | Performed by: FAMILY MEDICINE

## 2019-07-10 PROCEDURE — 99214 OFFICE O/P EST MOD 30 MIN: CPT | Mod: S$GLB,,, | Performed by: FAMILY MEDICINE

## 2019-07-10 NOTE — PROGRESS NOTES
Patient unable to perform spirometry. Multiple attempts made. Patient unable to follow directions. Dr. Johnson notified.

## 2019-07-10 NOTE — PROGRESS NOTES
Ashley Koenig  07/10/2019  [id number]    Maggy Ngo MD  Patient Care Team:  Maggy Ngo MD as PCP - General (Family Medicine)  Anoop Snyder MD as Consulting Physician (Hematology and Oncology)  Luz Marina Mccoy LPN as Care Coordinator (Internal Medicine)  Has the patient seen any provider outside of the [facility] network since the last visit? (no). If yes, HIPPA forms completed and records requested.        Visit Type:a scheduled routine follow-up visit    Chief Complaint:  Chief Complaint   Patient presents with    Follow-up     concerns of anxiety and depression       History of Present Illness:  Patient here for follow up.  Discussion on anxiety, depression and dementia/memory that has worsened.  Son reports that she doesn't want to get out of bed, getting angry. Mostly triggered by food, as she is on soft food.       Admit in March for resp failure and aspiration pneumonia.  Went to Nursing home for rehab.  Discharged after 3 weeks of PT.    Patient was at [facility] post discharge for OHS due to her debility from Pnuemonia.     Discharged on 5/3.     At the SNF/Rehab, she had PT, OT and Speech.  Son is with her today. He reports that she had another dx of Pneumonia while there, and given Rocephin and Doxycycline.       He reports that she went and had swallow eval with xray, but I do not have report.  I reviewed ST notes, and she has evidence of aspiration, and is on puree diet with nector.     She is followed by [facility] pulm. She remains on oxygen.   She has nebulizer.   She does have distress with coughing at time, gagging on phelgm and hard to catch breath.    Fluid status is normal. She had echo with normal EF, with Pulm HTN and diastolic dysfunction. Not sure if all from strain from Pneumonia, but seems to be compensated and fine. Not on any diuretics.       She has hearing loss. Seeing ENT. Chronic wax issues.    Son is primary care taker.  Had had anxiety for some  time.  Before relocating to [facility]. Was on Lexapro 20, and had not been able to wean down due to worsening anxiety off of this dose.  She is on low dose xanax as well.  Hearing issues also contributes to dementia sx.  She has history of Meningioma, with gamma knife tx. Son has refused any further work up for patient, or scan.     History:  Past Medical History:   Diagnosis Date    Acute congestive heart failure 3/6/2019    Age-related macular degeneration, wet, both eyes     Anginal equivalent     Anxiety     Arthritis     BPPV (benign paroxysmal positional vertigo)     Brain tumor     Clotting disorder     lung    COPD (chronic obstructive pulmonary disease)     Depression     Diastolic dysfunction     Hearing loss     Heart disease     Hyperlipidemia     Hypertension     Hypoxemia     Lung nodule     Meningioma     Multiple thyroid nodules     Pulmonary embolism     Pulmonary hypertension     Sepsis due to pneumonia     Sleep apnea in adult     Stroke     per eye exam, never been treated for    Urinary incontinence     Vitamin D deficiency      Past Surgical History:   Procedure Laterality Date    CATARACT EXTRACTION Bilateral     TOTAL HIP ARTHROPLASTY Right 08/20/2016    aafter fx     Family History   Problem Relation Age of Onset    Alzheimer's disease Father     Alzheimer's disease Daughter     No Known Problems Son      Social History     Socioeconomic History    Marital status:      Spouse name: Not on file    Number of children: Not on file    Years of education: Not on file    Highest education level: Not on file   Occupational History    Not on file   Social Needs    Financial resource strain: Not hard at all    Food insecurity:     Worry: Not on file     Inability: Not on file    Transportation needs:     Medical: Not on file     Non-medical: Not on file   Tobacco Use    Smoking status: Never Smoker    Smokeless tobacco: Never Used   Substance and Sexual  Activity    Alcohol use: No    Drug use: No    Sexual activity: Not Currently   Lifestyle    Physical activity:     Days per week: 0 days     Minutes per session: 0 min    Stress: Rather much   Relationships    Social connections:     Talks on phone: Three times a week     Gets together: Never     Attends [employer] service: Not on file     Active member of club or organization: No     Attends meetings of clubs or organizations: Never     Relationship status:    Other Topics Concern    Not on file   Social History Narrative    Patient recently moved from [place] to [place] in August 2017. She had previously lived in [place] her whole life. , 5 children (4 alive). Son Mook has medical POA. Does not drive.      Patient Active Problem List   Diagnosis    Meningioma    History of pulmonary embolism    Coronary artery disease involving native coronary artery of native heart without angina pectoris    Lung nodule    Multiple thyroid nodules    Anxiety and depression    Atherosclerosis of aorta    Hyperlipidemia    Diastolic dysfunction    Macular degeneration    Vitamin D deficiency    BPPV (benign paroxysmal positional vertigo)    Hearing loss    Cough    Dementia    Aspiration pneumonia    Requires supplemental oxygen    Dysphagia, oropharyngeal     Review of patient's allergies indicates:  No Known Allergies    The following were reviewed at this visit: active problem list, medication list, allergies, family history, social history, and health maintenance.    Medications:  Current Outpatient Medications on File Prior to Visit   Medication Sig Dispense Refill    aspirin (ECOTRIN) 81 MG EC tablet Take 1 tablet (81 mg total) by mouth once daily.  0    escitalopram oxalate (LEXAPRO) 20 MG tablet TAKE 1 TABLET ONCE DAILY 90 tablet 0    MULTIVITAMIN,THER AND MINERALS (VITAMINS AND MINERALS ORAL) Take by mouth once daily.      alprazolam (XANAX) 0.25 MG tablet Take 0.25 mg by mouth 2  (two) times daily as needed for Anxiety.       No current facility-administered medications on file prior to visit.        Medications have been reviewed and reconciled with patient at this visit.  Barriers to medications present (no)    Adverse reactions to current medications (no)    Over the counter medications reviewed (Yes ), and if needed added to active Medication list at this visit.     Exam:  Wt Readings from Last 3 Encounters:   07/10/19 70.6 kg (155 lb 10.3 oz)   05/13/19 74.8 kg (165 lb)   05/06/19 78.2 kg (172 lb 6.4 oz)     Temp Readings from Last 3 Encounters:   07/10/19 98.8 °F (37.1 °C) (Tympanic)   05/06/19 98.9 °F (37.2 °C) (Tympanic)   03/12/19 98.4 °F (36.9 °C)     BP Readings from Last 3 Encounters:   07/10/19 (!) 130/54   05/13/19 115/62   05/06/19 (!) 98/58     Pulse Readings from Last 3 Encounters:   07/10/19 66   05/13/19 93   05/06/19 85     Body mass index is 25.12 kg/m².      Review of Systems   HENT: Negative for hearing loss.    Eyes: Negative for discharge.   Respiratory: Positive for cough. Negative for wheezing.    Cardiovascular: Negative for chest pain and palpitations.   Gastrointestinal: Negative for blood in stool, constipation, diarrhea and vomiting.   Genitourinary: Negative for dysuria and hematuria.   Musculoskeletal: Negative for neck pain.   Neurological: Negative for weakness and headaches.   Endo/Heme/Allergies: Negative for polydipsia.   Psychiatric/Behavioral: Positive for depression and memory loss. The patient is nervous/anxious.      Physical Exam   Constitutional: She appears well-developed and well-nourished.   Eyes: Pupils are equal, round, and reactive to light. EOM are normal.   Cardiovascular: Normal rate and regular rhythm.   Pulmonary/Chest: Breath sounds normal. No respiratory distress.   Musculoskeletal: Normal range of motion. She exhibits no edema.   Psychiatric: Her mood appears anxious. Cognition and memory are impaired.   Nursing note and vitals  reviewed.      Laboratory Reviewed ({Yes)  Lab Results   Component Value Date    WBC 13.17 (H) 03/12/2019    HGB 12.5 03/12/2019    HCT 41.1 03/12/2019     03/12/2019    CHOL 219 (A) 01/13/2016    TRIG 141 01/13/2016    HDL 52 01/13/2016    ALT 24 03/12/2019    AST 39 03/12/2019     03/12/2019    K 4.2 03/12/2019    CL 94 (L) 03/12/2019    CREATININE 0.8 03/12/2019    BUN 17 03/12/2019    CO2 38 (H) 03/12/2019    TSH 2.077 08/13/2018    INR 1.0 03/06/2019       Ashley was seen today for follow-up.    Diagnoses and all orders for this visit:    Dementia without behavioral disturbance, unspecified dementia type    Meningioma    Anxiety and depression    Hearing loss, unspecified hearing loss type, unspecified laterality    Requires supplemental oxygen    Dysphagia, oropharyngeal    Cough    Discussed her medications with son.  Discussed use of Aricept, and indications.  Will not reverse dementia or sundowning.    On higher dose Lexapro.  Not sure that switching. Most irritation and depression is around the fact that she cannot eat what she wants due to her dysphagia and swallowing issues. On thickened soft foods    She is on Xanax now, and discussed the use of this med in elderly. She has been on this chronically, but only uses prn.      She has progressive memory issues with sundowning.  Supportive care with family and support is offered.  Wife stays home with her to care for her now.     Continue oxygen.                 Care Plan/Goals: Reviewed  (Yes Answers for HPI/ROS submitted by the patient on 7/5/2019   activity change: Yes  unexpected weight change: No  rhinorrhea: No  trouble swallowing: No  visual disturbance: No  chest tightness: No  polyuria: No  difficulty urinating: No  menstrual problem: No  joint swelling: No  arthralgias: No  confusion: No  dysphoric mood: Yes  )  Goals     None          Follow up: No follow-ups on file.    After visit summary was printed and given to patient upon  discharge today.  Patient goals and care plan are included in After Visit Summary.    Answers for HPI/ROS submitted by the patient on 7/5/2019   activity change: Yes  unexpected weight change: No  rhinorrhea: No  trouble swallowing: No  visual disturbance: No  chest tightness: No  polyuria: No  difficulty urinating: No  menstrual problem: No  joint swelling: No  arthralgias: No  confusion: No  dysphoric mood: Yes

## 2019-07-10 NOTE — PATIENT INSTRUCTIONS
Lung Anatomy  Your lungs take air in to give your body oxygen, which the body needs to work. Your lungs, like all the tissues in your body, are made up of billions of tiny specialized cells. Old lung cells die and are replaced by new, identical lung cells. This natural process helps ensure healthy lungs.    Date Last Reviewed: 11/1/2016  © 5015-9367 [employer]. [address] [place] [address], [place] [address]. All rights reserved. This information is not intended as a substitute for professional medical care. Always follow your healthcare professional's instructions.

## 2019-07-10 NOTE — PROCEDURES
"[facility] - Pulm Function [facility]  Six Minute Walk     SUMMARY     Ordering Provider: Dr. Johnson   Interpreting Provider: Dr. Johnson  Performing nurse/tech/RT: HERRERA Barcenas  Diagnosis: (Requires supplemental oxygen)  Height: 5' 6" (167.6 cm)  Weight: 70.5 kg (155 lb 6.8 oz)  BMI (Calculated): 25.1   Patient Race:             Phase Oxygen Assessment Supplemental O2 Heart   Rate Blood Pressure Deon Dyspnea Scale Rating   Resting 97 % Room Air 71 bpm 132/75 1   Exercise        Minute        1 95 % Room Air 84 bpm     2 95 % Room Air 84 bpm     3 95 % Room Air 85 bpm     4 95 % Room Air 86 bpm     5 97 % Room Air 86 bpm     6  94 % Room Air 92 bpm 134/66 3   Recovery        Minute        1 97 % Room Air 72 bpm     2 97 % Room Air 72 bpm     3 98 % Room Air 98 bpm     4 98 % Room Air 80 bpm 141/71 1     Six Minute Walk Summary  6MWT Status: completed without stopping  Patient Reported: No complaints     Interpretation:  Did the patient stop or pause?: No                                Total Time Walked (Calculated): 360 seconds  Final Partial Lap Distance (feet): 50 feet  Total Distance Meters (Calculated): 137.16 meters  Predicted Distance Meters (Calculated): 321.65 meters  Percentage of Predicted (Calculated): 42.64  Peak VO2 (Calculated): 8.09  Mets: 2.31  Has The Patient Had a Previous Six Minute Walk Test?: No       Previous 6MWT Results  Has The Patient Had a Previous Six Minute Walk Test?: No        PHYSICIAN INTERPRETATION:     Six minute walk distance is 137.16 / 321.65 meters  (42.64 % predicted) with light dyspnea.   Peak VO2 during walking was 8.09  Ml/min/kg [ 2.31 METS].   During exercise, there was no significant desaturation while breathing room air.   Lowest oxygen saturation during walking was 94 %.  Blood pressure remained stable and Heart rate increased significantly with walking.  Normotension was present prior to exercise.  This may represent a normal cardiovascular response to exercise.  The " patient did not report non-pulmonary symptoms during exercise.  Significant exercise impairment is likely due to subjective symptoms.  The patient did complete the study, walking 360 seconds of the 360 second test.  No previous study performed.  Based upon age and body mass index, exercise capacity is less than predicted.

## 2019-07-10 NOTE — PROGRESS NOTES
Subjective:      Patient ID: Ashley Koenig is a 89 y.o. female.  Patient Active Problem List   Diagnosis    Meningioma    History of pulmonary embolism    Coronary artery disease involving native coronary artery of native heart without angina pectoris    Multiple thyroid nodules    Anxiety and depression    Atherosclerosis of aorta    Hyperlipidemia    Diastolic dysfunction    Macular degeneration    Vitamin D deficiency    BPPV (benign paroxysmal positional vertigo)    Hearing loss    Dementia    Dysphagia, oropharyngeal     Problem list has been reviewed.    Chief Complaint: Pulmonary Nodules and Pneumonia      HPI    6MWT, CT chest reviewed with patient who voiced understanding. Patient was unable to perform PFT.       Patients reports stable  NYHA II dyspnea    The patient does not have currently have symptoms / an exacerbation.        A full  review of systems, past , family  and social histories was performed except as mentioned in the note above, these are non contributory to the main issues discussed today.        Previous Report Reviewed: office notes and radiology reports     The following portions of the patient's history were reviewed and updated as appropriate: She  has a past medical history of Acute congestive heart failure (3/6/2019), Age-related macular degeneration, wet, both eyes, Anginal equivalent, Anxiety, Arthritis, BPPV (benign paroxysmal positional vertigo), Brain tumor, Clotting disorder, COPD (chronic obstructive pulmonary disease), Depression, Diastolic dysfunction, Hearing loss, Heart disease, Hyperlipidemia, Hypertension, Hypoxemia, Lung nodule, Meningioma, Multiple thyroid nodules, Pulmonary embolism, Pulmonary hypertension, Sepsis due to pneumonia, Sleep apnea in adult, Stroke, Urinary incontinence, and Vitamin D deficiency.  She  has a past surgical history that includes Total hip arthroplasty (Right, 08/20/2016) and Cataract extraction (Bilateral).  Her family history  "includes Alzheimer's disease in her daughter and father; No Known Problems in her son.  She  reports that she has never smoked. She has never used smokeless tobacco. She reports that she does not drink alcohol or use drugs.  She has a current medication list which includes the following prescription(s): alprazolam, aspirin, escitalopram oxalate, and multivitamin,ther and minerals.  She has No Known Allergies..    Review of Systems   Constitutional: Negative for fever, chills and night sweats.   HENT: Negative for congestion and ear pain.    Respiratory: Positive for dyspnea on extertion. Negative for choking and chest tightness.    Cardiovascular: Negative for chest pain, palpitations and leg swelling.   Endocrine: Negative for cold intolerance and heat intolerance.    Musculoskeletal: Negative for arthralgias and back pain.   Gastrointestinal: Negative for vomiting and acid reflux.   Neurological: Negative for dizziness, light-headedness and headaches.   Psychiatric/Behavioral: The patient is nervous/anxious.         Objective:   BP (!) 140/70   Pulse 80   Resp 18   Ht 5' 6" (1.676 m)   Wt 70.3 kg (155 lb)   SpO2 98%   BMI 25.02 kg/m²   Body mass index is 25.02 kg/m².    Physical Exam   Constitutional: She is oriented to person, place, and time. She appears well-developed and well-nourished. No distress.   HENT:   Head: Normocephalic and atraumatic.   Left Ear: Tympanic membrane and ear canal normal.   Nose: No mucosal edema or rhinorrhea.   Mouth/Throat: Uvula is midline and mucous membranes are normal. Mucous membranes are not pale and not dry. No oropharyngeal exudate or posterior oropharyngeal erythema.   Eyes: Pupils are equal, round, and reactive to light. Conjunctivae, EOM and lids are normal. Right eye exhibits no chemosis. Left eye exhibits no chemosis. Right conjunctiva is not injected. Left conjunctiva is not injected. No scleral icterus. Right eye exhibits normal extraocular motion and no " nystagmus. Left eye exhibits normal extraocular motion and no nystagmus.   Neck: Trachea normal and phonation normal. No tracheal tenderness present. No tracheal deviation present. No thyroid mass and no thyromegaly present.   Cardiovascular: Intact distal pulses.   Pulmonary/Chest: Effort normal. No stridor. No respiratory distress. She has rales.   Abdominal: She exhibits no distension.   Musculoskeletal: Normal range of motion. She exhibits no edema.   Neurological: She is alert and oriented to person, place, and time. No cranial nerve deficit.   Skin: Skin is warm and dry. Capillary refill takes less than 2 seconds. No rash noted. No erythema.   Psychiatric: She has a normal mood and affect. Her behavior is normal.       Personal Diagnostic Review  CT of chest performed on 7/10/19:  without contrast:    Base of Neck: No significant abnormality.    Airways: Patent.    Lungs: Decreased lung volumes.  Study is motion limited.  Mild interstitial thickening is seen within the lower lobes could reflect some component of atelectasis with mild interstitial pneumonia not entirely excluded.  No rome consolidation.  Right middle lobe atelectasis noted.    Pleura: No pleural fluid.No pleural calcification.    Lashawn/Mediastinum: No pathologic sultana enlargement.    Esophagus: Normal.    Heart/pericardium: Stable cardiomegaly.  No pericardial effusion or calcification.    Pulmonary vasculature: Pulmonary arteries distribute normally.  There are four pulmonary veins.    Aorta: Left-sided aortic arch with 3 arterial branches.  The aorta maintains normal caliber, contour and course. There is moderate calcification of the thoracic aorta.  There is  mild coronary artery calcification.    Thoracic soft tissues: Normal. Both breasts are present.  Shotty nodes are seen within the right axillary region    Bones: Age-indeterminate wedge deformity which is nearly vertebra plana at T10.  Mild diffuse osteopenia. Mild degenerative changes.  Pectus deformity of the lower chest wall with a minimum AP diameter of 7 cm.    Upper Abdomen: Mild constipation otherwise unremarkable.      Six minute walk test: Six minute walk distance is 137.16 / 321.65 meters  (42.64 % predicted) with light dyspnea. Peak VO2 during walking was 8.09  Ml/min/kg [ 2.31 METS]. During exercise, there was no significant desaturation while breathing room air. Lowest oxygen saturation during walking was 94 %.  Blood pressure remained stable and Heart rate increased significantly with walking.  Normotension was present prior to exercise.  This may represent a normal cardiovascular response to exercise.  The patient did not report non-pulmonary symptoms during exercise.  Significant exercise impairment is likely due to subjective symptoms.  The patient did complete the study, walking 360 seconds of the 360 second test.  No previous study performed.  Based upon age and body mass index, exercise capacity is less than predicted.    Assessment / plan:         Discussed diagnosis, its evaluation, treatment and usual course. All questions answered.    Problem List Items Addressed This Visit        Hematology    History of pulmonary embolism - Primary    Current Assessment & Plan     Currently on oral ASPIRIN.          Relevant Orders    Pulse oximetry overnight       GI    Dysphagia, oropharyngeal    Current Assessment & Plan       Continue pureed diet with nectar thick liquids diet.   Aspiration precautions                   TIME SPENT WITH PATIENT: Time spent: 40 minutes in face to face  discussion concerning diagnosis, prognosis, review of lab and test results, benefits of treatment as well as management of disease, counseling of patient and coordination of care between various health  care providers . Greater than half the time spent was used for coordination of care and counseling of patient.     Follow up as needed.

## 2019-07-16 ENCOUNTER — CLINICAL SUPPORT (OUTPATIENT)
Dept: PULMONOLOGY | Facility: CLINIC | Age: 84
End: 2019-07-16
Payer: MEDICARE

## 2019-07-16 DIAGNOSIS — Z86.711 HISTORY OF PULMONARY EMBOLISM: Chronic | ICD-10-CM

## 2019-07-16 DIAGNOSIS — I26.99 OTHER PULMONARY EMBOLISM WITHOUT ACUTE COR PULMONALE, UNSPECIFIED CHRONICITY: Primary | ICD-10-CM

## 2019-07-16 PROCEDURE — 94762 PR NONINVASV OXYGEN SATUR, CONT: ICD-10-PCS | Mod: S$GLB,,, | Performed by: INTERNAL MEDICINE

## 2019-07-16 PROCEDURE — 94762 N-INVAS EAR/PLS OXIMTRY CONT: CPT | Mod: S$GLB,,, | Performed by: INTERNAL MEDICINE

## 2019-07-17 ENCOUNTER — TELEPHONE (OUTPATIENT)
Dept: PULMONOLOGY | Facility: CLINIC | Age: 84
End: 2019-07-17

## 2019-07-17 DIAGNOSIS — G47.34 NOCTURNAL HYPOXEMIA: Primary | ICD-10-CM

## 2019-07-17 NOTE — PROCEDURES
[facility]  [address] * [place] [address]  Telephone: [phone]  Test date: 19 Start: 19 20:19:31 Ashley Keonig  Doctor: Dr. Johnson End: 19 07:28:55 [id number]  Oximetry: Summary Report  Comments: Room Air  Recording time: 11:09:24 Highest pulse: 95 Highest SpO2: 94%  Excluded samplin:54:00 Lowest pulse: 42 Lowest SpO2: 72%  Total valid sampling: 10:15:24 Mean pulse: 57 Mean SpO2: 84.5%  1 S.D.: 7.9 1 S.D.: 2.6  Time with SpO2<90: 10:01:24, 97.7%  Time with SpO2<80: 0:19:24, 3.2%  Time with SpO2<70: 0:00:00, 0.0%  Time with SpO2<60: 0:00:00, 0.0%  Time with SpO2<88: 9:01:28, 88.0%  Time with SpO2 =>90: 0:14:00, 2.3%  Time with SpO2=>80 & <90: 9:42:00, 94.6%  Time with SpO2=>70 & <80: 0:19:24, 3.2%  Time with SpO2=>60 & <70: 0:00:00, 0.0%  The longest continuous time with saturation <=88 was 03:10:48, which started at  19 21:54:51.  A desaturation event was defined as a decrease of saturation by 4 or more.  No events were excluded due to artifact.  There were 23 desaturation events over 3 minutes duration.  There were 349 desaturation events of less than 3 minutes duration during which:  The mean high was 86.8%. The mean low was 82.0%.  The number of these events that were:  > 0 & <10 seconds: 76 > 0 seconds: 349  =>10 & <20 seconds: 154 =>10 seconds: 273  =>20 & <30 seconds: 10 =>20 seconds: 119  =>30 & <40 seconds: 19 =>30 seconds: 109  =>40 & <50 seconds: 24 =>40 seconds: 90  =>50 & <60 seconds: 10 =>50 seconds: 66  =>60 seconds: 56 =>60 seconds: 56  The mean length of desaturation events that were >=10 sec & <=3 mins was: 35.7 sec.  Desaturation event index (events >=10 sec per sampled hour): 26.6  Desaturation event index (events >= 0 sec per sampled hour): 34.0      PHYSICIAN INTERPRETATION:    OVERNIGHT OXIMETRY REPORT:    Dictated by: Ryan Ferro M.D.  Test date: 19  Dictated on: 2019      Comment: This test was performed on Room  Air     A desaturation event was defined as a decrease of saturation by 4 or more.    REPORT SUMMARY  Total recording time: 11:09:24  Excluded samplin:05:00  Total valid sampling: 10:15:24  High pulse: 95 /min  Low pulse: 42 /min    Mean pulse: 57/min    High SpO2: 94%    Low SpO2: 72%    Mean SpO2  84.5 %  Cumulative time with oxygen saturation less than 88% (TC88): 09:01:28 ( 88%)      CLINICAL INTERPRETATION   There is  significant nocturnal oxygen desaturation,  Clinical correlation is advised and  Recommend overnight polysomnography if clinically indicated    Medicare Criteria Comments:   Oximetry test results suggest the patient falls under Medicare Group 1 Criteria. ( Arterial oxygen saturation at or below 88% for at least 5 minutes taken during sleep)    Details about Medicare Group Criteria coverage can be found at http://www.cms.hhs.gov/manuals/downloads/

## 2019-07-17 NOTE — TELEPHONE ENCOUNTER
OVERNIGHT OXIMETRY REPORT:    Dictated by: Ryan Ferro M.D.  Test date: 19  Dictated on: 2019      Comment: This test was performed on Room Air     A desaturation event was defined as a decrease of saturation by 4 or more.    REPORT SUMMARY  Total recording time: 11:09:24  Excluded samplin:05:00  Total valid sampling: 10:15:24  High pulse: 95 /min  Low pulse: 42 /min    Mean pulse: 57/min    High SpO2: 94%    Low SpO2: 72%    Mean SpO2  84.5 %  Cumulative time with oxygen saturation less than 88% (TC88): 09:01:28 ( 88%)      CLINICAL INTERPRETATION   There is  significant nocturnal oxygen desaturation,  Clinical correlation is advised and  Recommend overnight polysomnography if clinically indicated      PLAN:    1. Start Nocturnal oxygen supplementation at 2 L /min  2. Repeat ONSAT on nocturnal oxygen supplementation at 2 L / min    Please inform patient

## 2019-07-17 NOTE — TELEPHONE ENCOUNTER
Conveyed results of overnight pulse oximetry on room air. Instructed to use 2L of oxygen with sleep

## 2019-07-22 ENCOUNTER — PATIENT MESSAGE (OUTPATIENT)
Dept: PULMONOLOGY | Facility: CLINIC | Age: 84
End: 2019-07-22

## 2019-07-22 DIAGNOSIS — G47.34 NOCTURNAL HYPOXEMIA: Primary | ICD-10-CM

## 2019-07-24 ENCOUNTER — PATIENT MESSAGE (OUTPATIENT)
Dept: INTERNAL MEDICINE | Facility: CLINIC | Age: 84
End: 2019-07-24

## 2019-07-26 ENCOUNTER — PATIENT MESSAGE (OUTPATIENT)
Dept: INTERNAL MEDICINE | Facility: CLINIC | Age: 84
End: 2019-07-26

## 2019-07-29 DIAGNOSIS — F41.1 GENERALIZED ANXIETY DISORDER: ICD-10-CM

## 2019-07-29 RX ORDER — ESCITALOPRAM OXALATE 20 MG/1
TABLET ORAL
Qty: 90 TABLET | Refills: 0 | Status: SHIPPED | OUTPATIENT
Start: 2019-07-29 | End: 2019-10-16 | Stop reason: SDUPTHER

## 2019-08-06 NOTE — PROGRESS NOTES
Ahsley Koenig  05/07/2019  [id number]    Maggy Ngo MD  Patient Care Team:  Maggy Ngo MD as PCP - General (Family Medicine)  Anoop Snyder MD as Consulting Physician (Hematology and Oncology)  Luz Marina Mccoy LPN as Care Coordinator (Internal Medicine)  Has the patient seen any provider outside of the [facility] network since the last visit? (no). If yes, HIPPA forms completed and records requested.        Visit Type:a scheduled routine follow-up visit    Chief Complaint:  Chief Complaint   Patient presents with    Follow-up     from nursing home an going over medications. recovering from pneumonia.    Medication Refill     script for ipr       History of Present Illness:    Patient was at [facility] post discharge for OHS due to her debility from Pnuemonia.    Discharged on 5/3.    At the SNF/Rehab, she had PT, OT and Speech.  Son is with her today. He reports that she had another dx of Pneumonia while there, and given Rocephin and Doxycycline.      He reports that she went and had swallow eval with xray, but I do not have report.  I reviewed ST notes, and she has evidence of aspiration, and is on puree diet with nector.     [facility] was consulted for home assistance. PT as well. I do not see where ST was consulted so we will do this.    She does have a cough, and is on Oxygen.  Goal is to keep her >92.  She is on 2-3 liter.  She has a nebulizer at home, but no medication for it.    Son reports she will get a thick cough, where she shows some distress (reports hard to catch breath, and pink flushing), but overall better from admit.    Fluid status is normal. She had echo with normal EF, with Pulm HTN and diastolic dysfunction. Not sure if all from strain from Pneumonia, but seems to be compensated and fine. Not on any diuretics.    She has hearing loss. Seeing ENT Wed.  Wax in right ear, left is clear.     She is dementia, and son is primary care taker and manages  The patient desires to have better * vision. The patient was advised on the option of * to improve * vision. The patient elects to proceed with * O*, goal of *. affairs. She does not have living will on file.       History:  Past Medical History:   Diagnosis Date    Acute congestive heart failure 3/6/2019    Age-related macular degeneration, wet, both eyes     Anginal equivalent     Anxiety     Arthritis     BPPV (benign paroxysmal positional vertigo)     Brain tumor     Clotting disorder     lung    COPD (chronic obstructive pulmonary disease)     Depression     Diastolic dysfunction     Hearing loss     Heart disease     Hyperlipidemia     Hypertension     Hypoxemia     Lung nodule     Meningioma     Multiple thyroid nodules     Pulmonary embolism     Pulmonary hypertension     Sepsis due to pneumonia     Sleep apnea in adult     Stroke     per eye exam, never been treated for    Urinary incontinence     Vitamin D deficiency      Past Surgical History:   Procedure Laterality Date    CATARACT EXTRACTION Bilateral     TOTAL HIP ARTHROPLASTY Right 08/20/2016    aafter fx     Family History   Problem Relation Age of Onset    Alzheimer's disease Father     Alzheimer's disease Daughter     No Known Problems Son      Social History     Socioeconomic History    Marital status:      Spouse name: Not on file    Number of children: Not on file    Years of education: Not on file    Highest education level: Not on file   Occupational History    Not on file   Social Needs    Financial resource strain: Not hard at all    Food insecurity:     Worry: Not on file     Inability: Not on file    Transportation needs:     Medical: Not on file     Non-medical: Not on file   Tobacco Use    Smoking status: Never Smoker    Smokeless tobacco: Never Used   Substance and Sexual Activity    Alcohol use: No    Drug use: No    Sexual activity: Not Currently   Lifestyle    Physical activity:     Days per week: 0 days     Minutes per session: 0 min    Stress: Rather much   Relationships    Social connections:     Talks on phone: Three times a week      Gets together: Never     Attends [employer] service: Not on file     Active member of club or organization: No     Attends meetings of clubs or organizations: Never     Relationship status:    Other Topics Concern    Not on file   Social History Narrative    Patient recently moved from [place] to [place] in August 2017. She had previously lived in [place] her whole life. , 5 children (4 alive). Son Mook has medical POA. Does not drive.      Patient Active Problem List   Diagnosis    Meningioma    History of pulmonary embolism    Coronary artery disease involving native coronary artery of native heart without angina pectoris    Lung nodule    Multiple thyroid nodules    Anxiety and depression    Atherosclerosis of aorta    Hyperlipidemia    Diastolic dysfunction    Macular degeneration    Vitamin D deficiency    BPPV (benign paroxysmal positional vertigo)    Hearing loss    Cough    Dementia    Aspiration pneumonia    Oxygen dependent     Review of patient's allergies indicates:  No Known Allergies    The following were reviewed at this visit: active problem list, medication list, allergies, family history, social history, and health maintenance.    Medications:  Current Outpatient Medications on File Prior to Visit   Medication Sig Dispense Refill    alprazolam (XANAX) 0.25 MG tablet Take 0.25 mg by mouth 2 (two) times daily as needed for Anxiety.      aspirin (ECOTRIN) 81 MG EC tablet Take 1 tablet (81 mg total) by mouth once daily.  0    escitalopram oxalate (LEXAPRO) 20 MG tablet TAKE 1 TABLET ONCE DAILY 90 tablet 0    MULTIVITAMIN,THER AND MINERALS (VITAMINS AND MINERALS ORAL) Take by mouth once daily.      niacin 100 MG Tab Take 100 mg by mouth every evening.       No current facility-administered medications on file prior to visit.        Medications have been reviewed and reconciled with patient at this visit.  Barriers to medications present (no)    Adverse reactions to  current medications (no)    Over the counter medications reviewed (No ), and if needed added to active Medication list at this visit.     Exam:  Wt Readings from Last 3 Encounters:   05/06/19 78.2 kg (172 lb 6.4 oz)   03/11/19 78.2 kg (172 lb 6.4 oz)   02/27/19 77.7 kg (171 lb 4.8 oz)     Temp Readings from Last 3 Encounters:   05/06/19 98.9 °F (37.2 °C) (Tympanic)   03/12/19 98.4 °F (36.9 °C)   02/27/19 99.2 °F (37.3 °C) (Tympanic)     BP Readings from Last 3 Encounters:   05/06/19 (!) 98/58   03/12/19 (!) 148/67   02/27/19 132/60     Pulse Readings from Last 3 Encounters:   05/06/19 85   03/12/19 66   02/27/19 91     Body mass index is 27.06 kg/m².      Review of Systems   HENT: Positive for hearing loss.    Eyes: Negative for discharge.   Respiratory: Positive for cough. Negative for wheezing.    Cardiovascular: Negative for chest pain and palpitations.   Gastrointestinal: Negative for blood in stool, constipation, diarrhea and vomiting.   Genitourinary: Negative for dysuria and hematuria.   Musculoskeletal: Negative for neck pain.   Neurological: Negative for weakness and headaches.   Endo/Heme/Allergies: Negative for polydipsia.   Psychiatric/Behavioral: Positive for memory loss.     Physical Exam   Constitutional: She appears well-developed and well-nourished.   HENT:   Head: Normocephalic.   Right Ear: Decreased hearing is noted.   Left Ear: Decreased hearing is noted.   Wax in right ear   Neck: Neck supple.   Cardiovascular: Normal rate and regular rhythm.   Pulmonary/Chest: Effort normal and breath sounds normal. No respiratory distress. She has no wheezes.   Neurological: She is alert.   Psychiatric: Cognition and memory are impaired.   Nursing note and vitals reviewed.      Laboratory Reviewed ({Yes)  Lab Results   Component Value Date    WBC 13.17 (H) 03/12/2019    HGB 12.5 03/12/2019    HCT 41.1 03/12/2019     03/12/2019    ALT 24 03/12/2019    AST 39 03/12/2019     03/12/2019    K 4.2  03/12/2019    CL 94 (L) 03/12/2019    CREATININE 0.8 03/12/2019    BUN 17 03/12/2019    CO2 38 (H) 03/12/2019    TSH 2.077 08/13/2018    INR 1.0 03/06/2019       Ashley was seen today for follow-up and medication refill.    Diagnoses and all orders for this visit:    Dementia without behavioral disturbance, unspecified dementia type    Chronic obstructive pulmonary disease, unspecified COPD type  -     albuterol-ipratropium (DUO-NEB) 2.5 mg-0.5 mg/3 mL nebulizer solution; Take 3 mLs by nebulization every 6 (six) hours as needed for Wheezing. Rescue  -     NEBULIZER KIT (SUPPLIES) FOR HOME USE    Aspiration pneumonia of both lower lobes, unspecified aspiration pneumonia type    Oxygen dependent      Review of [facility] discharge paperwork  Home health will come tomorrow for certification  PT OT  Need Speech, reviewed and high aspiration risk.  Pureed food and thickened with nector.    Reviewed home meds.  Duoneb sent.  Remain on Oxygen to keep >92% (2-3 L)    Will plan to now set up with Pulmonary    Recheck 2 months          Care Plan/Goals: Reviewed  (N/A)  Goals     None          Follow up: No follow-ups on file.    After visit summary was printed and given to patient upon discharge today.  Patient goals and care plan are included in After Visit Summary.    Answers for HPI/ROS submitted by the patient on 5/5/2019   activity change: No  unexpected weight change: No  rhinorrhea: No  trouble swallowing: No  visual disturbance: No  chest tightness: No  polyuria: No  difficulty urinating: No  menstrual problem: No  joint swelling: No  arthralgias: No  confusion: Yes  dysphoric mood: No     unknown

## 2019-10-16 DIAGNOSIS — F41.1 GENERALIZED ANXIETY DISORDER: ICD-10-CM

## 2019-10-16 RX ORDER — ESCITALOPRAM OXALATE 20 MG/1
TABLET ORAL
Qty: 90 TABLET | Refills: 0 | Status: SHIPPED | OUTPATIENT
Start: 2019-10-16 | End: 2020-01-05

## 2019-11-07 ENCOUNTER — OFFICE VISIT (OUTPATIENT)
Dept: OTOLARYNGOLOGY | Facility: CLINIC | Age: 84
End: 2019-11-07
Payer: MEDICARE

## 2019-11-07 ENCOUNTER — IMMUNIZATION (OUTPATIENT)
Dept: PHARMACY | Facility: CLINIC | Age: 84
End: 2019-11-07
Payer: MEDICARE

## 2019-11-07 VITALS
SYSTOLIC BLOOD PRESSURE: 130 MMHG | DIASTOLIC BLOOD PRESSURE: 66 MMHG | HEART RATE: 73 BPM | TEMPERATURE: 100 F | WEIGHT: 151 LBS | BODY MASS INDEX: 24.37 KG/M2

## 2019-11-07 DIAGNOSIS — H90.3 SENSORINEURAL HEARING LOSS (SNHL) OF BOTH EARS: Primary | ICD-10-CM

## 2019-11-07 DIAGNOSIS — H61.23 BILATERAL IMPACTED CERUMEN: ICD-10-CM

## 2019-11-07 PROCEDURE — 3288F FALL RISK ASSESSMENT DOCD: CPT | Mod: CPTII,S$GLB,, | Performed by: PHYSICIAN ASSISTANT

## 2019-11-07 PROCEDURE — 1100F PR PT FALLS ASSESS DOC 2+ FALLS/FALL W/INJURY/YR: ICD-10-PCS | Mod: CPTII,S$GLB,, | Performed by: PHYSICIAN ASSISTANT

## 2019-11-07 PROCEDURE — 3288F PR FALLS RISK ASSESSMENT DOCUMENTED: ICD-10-PCS | Mod: CPTII,S$GLB,, | Performed by: PHYSICIAN ASSISTANT

## 2019-11-07 PROCEDURE — 99999 PR PBB SHADOW E&M-EST. PATIENT-LVL III: ICD-10-PCS | Mod: PBBFAC,,, | Performed by: PHYSICIAN ASSISTANT

## 2019-11-07 PROCEDURE — 99499 UNLISTED E&M SERVICE: CPT | Mod: S$GLB,,, | Performed by: PHYSICIAN ASSISTANT

## 2019-11-07 PROCEDURE — 99213 OFFICE O/P EST LOW 20 MIN: CPT | Mod: 25,S$GLB,, | Performed by: PHYSICIAN ASSISTANT

## 2019-11-07 PROCEDURE — 99213 PR OFFICE/OUTPT VISIT, EST, LEVL III, 20-29 MIN: ICD-10-PCS | Mod: 25,S$GLB,, | Performed by: PHYSICIAN ASSISTANT

## 2019-11-07 PROCEDURE — 99999 PR PBB SHADOW E&M-EST. PATIENT-LVL III: CPT | Mod: PBBFAC,,, | Performed by: PHYSICIAN ASSISTANT

## 2019-11-07 PROCEDURE — 99499 RISK ADDL DX/OHS AUDIT: ICD-10-PCS | Mod: S$GLB,,, | Performed by: PHYSICIAN ASSISTANT

## 2019-11-07 PROCEDURE — 69210 PR REMOVAL IMPACTED CERUMEN REQUIRING INSTRUMENTATION, UNILATERAL: ICD-10-PCS | Mod: S$GLB,,, | Performed by: PHYSICIAN ASSISTANT

## 2019-11-07 PROCEDURE — 69210 REMOVE IMPACTED EAR WAX UNI: CPT | Mod: S$GLB,,, | Performed by: PHYSICIAN ASSISTANT

## 2019-11-07 PROCEDURE — 1100F PTFALLS ASSESS-DOCD GE2>/YR: CPT | Mod: CPTII,S$GLB,, | Performed by: PHYSICIAN ASSISTANT

## 2019-11-07 NOTE — PROGRESS NOTES
Subjective:       Patient ID: Ashley Koenig is a 89 y.o. female.    Chief Complaint: Cerumen Impaction    Patient is a very pleasant 89 y.o. female here to see me today for suspected cerumen impactions affecting her hearing.  She has known hearing loss and has worn hearing aids for many years (bought in [place]).  Her RIGHT ear is usually her better hearing ear per her son Albaro.  She's recently had issues with her hearing aids not working as well for her and they thinks it's due to wax.  She denies any recent ear pain or drainage.  She has not had any previous otologic surgery.  They have recently used Debrox last week to flush her ears with no results.  She was last here with me in May 2019 for removal of hearing aid battery from right ear.  Her son says her last audiogram and reprogramming of hearing aids was about 2 years ago (not at [facility]).    Review of Systems   Constitutional: Negative for fever.   HENT: Positive for hearing loss. Negative for congestion, ear discharge and ear pain.    Musculoskeletal: Positive for gait problem (uses walker).   Psychiatric/Behavioral: Positive for confusion (dementia).       Objective:      Physical Exam   Constitutional: She is oriented to person, place, and time. She appears well-developed and well-nourished. She is cooperative. No distress.   HENT:   Head: Normocephalic and atraumatic.   Right Ear: External ear and ear canal normal.   Left Ear: External ear and ear canal normal.   Nose: Nose normal. No mucosal edema or rhinorrhea.   Mouth/Throat: Uvula is midline, oropharynx is clear and moist and mucous membranes are normal. No oropharyngeal exudate.   R>L cerumen impactions (removal described below)   Eyes: Pupils are equal, round, and reactive to light. Conjunctivae, EOM and lids are normal. Right eye exhibits no chemosis. Left eye exhibits no chemosis. Right conjunctiva is not injected. Left conjunctiva is not injected. Right eye exhibits normal extraocular  motion. Left eye exhibits normal extraocular motion.   Neck: Trachea normal and phonation normal.   Pulmonary/Chest: Effort normal. No accessory muscle usage or stridor. No respiratory distress.   Abdominal: She exhibits no distension.   Lymphadenopathy:        Head (right side): No preauricular and no posterior auricular adenopathy present.        Head (left side): No preauricular and no posterior auricular adenopathy present.     She has no cervical adenopathy.   Neurological: She is alert and oriented to person, place, and time. No cranial nerve deficit.   Skin: Skin is warm and dry. No rash noted. No erythema.   Psychiatric: She has a normal mood and affect. Her behavior is normal.         Procedure Note    CHIEF COMPLAINT:  Cerumen Impaction    Description:  The patient was seated in an exam chair.  An ear speculum was placed in the right EAC and was examined under the microscope.  Suction and/or loop curettes were used to remove a large cerumen impaction.  The tympanic membrane was visualized and was normal in appearance.  The procedure was repeated on the left side in a similar fashion.  The TM was intact and normal on this side as well.  The patient tolerated the procedure well.      Assessment:       1. Sensorineural hearing loss (SNHL) of both ears    2. Bilateral impacted cerumen        Plan:         She has hearing aids from [place] that are no longer working as well for her.  They are [employer] which we don't service/reprogram here at [facility].  I recommend repeat audiogram and then reprogramming hearing aids (they have used provider off of [facility] about 2 years ago and will return there).     Cerumen impaction:  Moderate cerumen removed today without difficulty.  I would recommend the use of a wax softening drop, either over the counter Debrox or mineral oil, on a weekly basis.  I also instructed the patient to avoid Qtips.

## 2019-11-20 NOTE — PROGRESS NOTES
Ashley Koenig  11/21/2019  [id number]    Maggy Ngo MD  Patient Care Team:  Maggy Ngo MD as PCP - General (Family Medicine)  Anoop Snyder MD as Consulting Physician (Hematology and Oncology)  Luz Marina Mccoy LPN as Care Coordinator (Internal Medicine)  Has the patient seen any provider outside of the [facility] network since the last visit? (no). If yes, HIPPA forms completed and records requested.        Visit Type:a scheduled routine follow-up visit    Chief Complaint:  Chief Complaint   Patient presents with    Annual Exam       History of Present Illness:  89 year old here to check up.    Discussion on anxiety, depression and dementia/memory that has worsened.  Mostly triggered by food, as she is on soft food due to risk of aspiration. She had aspiration pneumonia this spring.        Admit in March for resp failure and aspiration pneumonia.  Went to Nursing home for rehab.  Discharged after 3 weeks of PT.     Patient was at [facility] post discharge for [facility] due to her debility from Pnuemonia.     Discharged on 5/3.     At the SNF/Rehab, she had PT, OT and Speech.  Son is with her today. He reports that she had another dx of Pneumonia while there, and given Rocephin and Doxycycline.       She is followed by [facility] pulm. She remains on oxygen.   She has nebulizer.   She does have distress with coughing at time, gagging on phelgm and hard to catch breath.     Fluid status is normal. She had echo with normal EF, with Pulm HTN and diastolic dysfunction. Not sure if all from strain from Pneumonia, but seems to be compensated and fine. Not on any diuretics.    She has hearing loss. Seeing ENT. Chronic wax issues.     Son is primary care taker.  Had had anxiety for some time.  Before relocating to [facility] Was on Lexapro 20, and had not been able to wean down due to worsening anxiety off of this dose.  She is on low dose xanax as well.  Hearing issues also contributes to dementia  sx.  She has history of Meningioma, with gamma knife tx. Son has refused any further work up for patient, or scan.           History:  Past Medical History:   Diagnosis Date    Acute congestive heart failure 3/6/2019    Age-related macular degeneration, wet, both eyes     Anginal equivalent     Anxiety     Arthritis     BPPV (benign paroxysmal positional vertigo)     Brain tumor     Clotting disorder     lung    COPD (chronic obstructive pulmonary disease)     Depression     Diastolic dysfunction     Hearing loss     Heart disease     Hyperlipidemia     Hypertension     Hypoxemia     Lung nodule     Meningioma     Multiple thyroid nodules     Pulmonary embolism     Pulmonary hypertension     Sepsis due to pneumonia     Sleep apnea in adult     Stroke     per eye exam, never been treated for    Urinary incontinence     Vitamin D deficiency      Past Surgical History:   Procedure Laterality Date    CATARACT EXTRACTION Bilateral     TOTAL HIP ARTHROPLASTY Right 08/20/2016    aafter fx     Family History   Problem Relation Age of Onset    Alzheimer's disease Father     Alzheimer's disease Daughter     No Known Problems Son      Social History     Socioeconomic History    Marital status:      Spouse name: Not on file    Number of children: Not on file    Years of education: Not on file    Highest education level: Not on file   Occupational History    Not on file   Social Needs    Financial resource strain: Not hard at all    Food insecurity:     Worry: Not on file     Inability: Not on file    Transportation needs:     Medical: Not on file     Non-medical: Not on file   Tobacco Use    Smoking status: Never Smoker    Smokeless tobacco: Never Used   Substance and Sexual Activity    Alcohol use: No    Drug use: No    Sexual activity: Not Currently   Lifestyle    Physical activity:     Days per week: 0 days     Minutes per session: 0 min    Stress: Rather much    Relationships    Social connections:     Talks on phone: Three times a week     Gets together: Never     Attends Ca[employer] service: Not on file     Active member of club or organization: No     Attends meetings of clubs or organizations: Never     Relationship status:    Other Topics Concern    Not on file   Social History Narrative    Patient recently moved from [place] to [place] in August 2017. She had previously lived in [place] her whole life. , 5 children (4 alive). Son Mook has medical POA. Does not drive.      Patient Active Problem List   Diagnosis    Meningioma    History of pulmonary embolism    Coronary artery disease involving native coronary artery of native heart without angina pectoris    Multiple thyroid nodules    Anxiety and depression    Atherosclerosis of aorta    Hyperlipidemia    Diastolic dysfunction    Macular degeneration    Vitamin D deficiency    BPPV (benign paroxysmal positional vertigo)    Hearing loss    Dementia    Dysphagia, oropharyngeal    Nocturnal hypoxemia     Review of patient's allergies indicates:  No Known Allergies    The following were reviewed at this visit: active problem list, medication list, allergies, family history, social history, and health maintenance.    Medications:  Current Outpatient Medications on File Prior to Visit   Medication Sig Dispense Refill    aspirin (ECOTRIN) 81 MG EC tablet Take 1 tablet (81 mg total) by mouth once daily.  0    escitalopram oxalate (LEXAPRO) 20 MG tablet TAKE 1 TABLET ONCE DAILY 90 tablet 0    MULTIVITAMIN,THER AND MINERALS (VITAMINS AND MINERALS ORAL) Take by mouth once daily.      alprazolam (XANAX) 0.25 MG tablet Take 0.25 mg by mouth 2 (two) times daily as needed for Anxiety.       No current facility-administered medications on file prior to visit.        Medications have been reviewed and reconciled with patient at this visit.  Barriers to medications present (no)    Adverse reactions  to current medications (no)    Over the counter medications reviewed (Yes ), and if needed added to active Medication list at this visit.     Exam:  Wt Readings from Last 3 Encounters:   11/07/19 68.5 kg (151 lb 0.2 oz)   07/10/19 70.3 kg (155 lb)   07/10/19 70.5 kg (155 lb 6.8 oz)     Temp Readings from Last 3 Encounters:   11/21/19 99 °F (37.2 °C) (Tympanic)   11/07/19 100.2 °F (37.9 °C) (Tympanic)   07/10/19 98.8 °F (37.1 °C) (Tympanic)     BP Readings from Last 3 Encounters:   11/07/19 130/66   07/10/19 (!) 140/70   07/10/19 (!) 130/54     Pulse Readings from Last 3 Encounters:   11/07/19 73   07/10/19 80   07/10/19 66     Body mass index is 24.37 kg/m².      Review of Systems   HENT: Negative for hearing loss.    Eyes: Negative for discharge.   Respiratory: Positive for cough. Negative for wheezing.    Cardiovascular: Negative for chest pain and palpitations.   Gastrointestinal: Negative for blood in stool, constipation, diarrhea and vomiting.   Genitourinary: Negative for dysuria and hematuria.   Musculoskeletal: Negative for neck pain.   Neurological: Positive for weakness. Negative for headaches.   Endo/Heme/Allergies: Negative for polydipsia.   Psychiatric/Behavioral: Positive for memory loss. The patient is nervous/anxious.      Physical Exam   Constitutional: She is oriented to person, place, and time. She appears well-developed and well-nourished. No distress.   HENT:   Head: Normocephalic and atraumatic.   Nose: Nose normal.   Mouth/Throat: No oropharyngeal exudate.   Hard of hearing   Eyes: Pupils are equal, round, and reactive to light. Conjunctivae and EOM are normal. Right eye exhibits no discharge. Left eye exhibits no discharge.   Neck: Normal range of motion. Neck supple. No thyromegaly present.   Cardiovascular: Normal rate, regular rhythm, normal heart sounds and intact distal pulses.   No murmur heard.  Pulmonary/Chest: Effort normal and breath sounds normal. No respiratory distress. She has  no wheezes.   Abdominal: Soft. Bowel sounds are normal. She exhibits no distension and no mass. There is no tenderness.   Musculoskeletal: Normal range of motion. She exhibits no edema.   Lymphadenopathy:     She has no cervical adenopathy.   Neurological: She is alert and oriented to person, place, and time. No cranial nerve deficit.   Skin: Capillary refill takes less than 2 seconds. She is not diaphoretic.   Psychiatric: She has a normal mood and affect. Cognition and memory are impaired. She exhibits abnormal recent memory and abnormal remote memory.   Nursing note and vitals reviewed.      Laboratory Reviewed ({Yes)  Lab Results   Component Value Date    WBC 13.17 (H) 03/12/2019    HGB 12.5 03/12/2019    HCT 41.1 03/12/2019     03/12/2019    CHOL 219 (A) 01/13/2016    TRIG 141 01/13/2016    HDL 52 01/13/2016    ALT 24 03/12/2019    AST 39 03/12/2019     03/12/2019    K 4.2 03/12/2019    CL 94 (L) 03/12/2019    CREATININE 0.8 03/12/2019    BUN 17 03/12/2019    CO2 38 (H) 03/12/2019    TSH 2.077 08/13/2018    INR 1.0 03/06/2019       Ashley was seen today for annual exam.    Diagnoses and all orders for this visit:    Regular check-up  -     CBC auto differential; Future  -     Comprehensive metabolic panel; Future  -     TSH; Future  -     Vitamin B12; Future  -     Folate; Future    Nocturnal hypoxemia  -     CBC auto differential; Future    Meningioma  -     CBC auto differential; Future    Dementia without behavioral disturbance, unspecified dementia type  -     CBC auto differential; Future  -     TSH; Future  -     Vitamin B12; Future  -     Folate; Future    Coronary artery disease involving native coronary artery of native heart without angina pectoris  -     CBC auto differential; Future    Anxiety and depression  -     CBC auto differential; Future    Other orders  -     Cancel: Vitamin D; Future        Discussed Living Will and advanced directive with Son  Will need to discuss DNR  status.    Also Home Instead for sitting    Check Labs  Home Health due to debility  PT    Stable on meds  Discussed the Lexapro 20. Son does not want to again try decrease  We have tried before and anxiety    Recheck 6 months    Try to get her to wear oxygen at night          Care Plan/Goals: Reviewed  (N/A)  Goals    None         Follow up: No follow-ups on file.    After visit summary was printed and given to patient upon discharge today.  Patient goals and care plan are included in After Visit Summary.    Answers for HPI/ROS submitted by the patient on 11/20/2019   activity change: Yes  unexpected weight change: No  rhinorrhea: No  trouble swallowing: No  visual disturbance: No  chest tightness: No  polyuria: No  difficulty urinating: No  menstrual problem: No  joint swelling: No  arthralgias: No  confusion: Yes  dysphoric mood: Yes

## 2019-11-21 ENCOUNTER — OFFICE VISIT (OUTPATIENT)
Dept: INTERNAL MEDICINE | Facility: CLINIC | Age: 84
End: 2019-11-21
Payer: MEDICARE

## 2019-11-21 ENCOUNTER — LAB VISIT (OUTPATIENT)
Dept: LAB | Facility: HOSPITAL | Age: 84
End: 2019-11-21
Attending: FAMILY MEDICINE
Payer: MEDICARE

## 2019-11-21 VITALS
OXYGEN SATURATION: 95 % | HEART RATE: 79 BPM | HEIGHT: 66 IN | TEMPERATURE: 99 F | SYSTOLIC BLOOD PRESSURE: 140 MMHG | DIASTOLIC BLOOD PRESSURE: 76 MMHG | WEIGHT: 151 LBS | BODY MASS INDEX: 24.27 KG/M2

## 2019-11-21 DIAGNOSIS — I25.10 CORONARY ARTERY DISEASE INVOLVING NATIVE CORONARY ARTERY OF NATIVE HEART WITHOUT ANGINA PECTORIS: Chronic | ICD-10-CM

## 2019-11-21 DIAGNOSIS — G47.34 NOCTURNAL HYPOXEMIA: ICD-10-CM

## 2019-11-21 DIAGNOSIS — F41.9 ANXIETY AND DEPRESSION: ICD-10-CM

## 2019-11-21 DIAGNOSIS — D32.9 MENINGIOMA: ICD-10-CM

## 2019-11-21 DIAGNOSIS — Z00.00 REGULAR CHECK-UP: ICD-10-CM

## 2019-11-21 DIAGNOSIS — F03.90 DEMENTIA WITHOUT BEHAVIORAL DISTURBANCE, UNSPECIFIED DEMENTIA TYPE: Chronic | ICD-10-CM

## 2019-11-21 DIAGNOSIS — F32.A ANXIETY AND DEPRESSION: ICD-10-CM

## 2019-11-21 DIAGNOSIS — Z00.00 REGULAR CHECK-UP: Primary | ICD-10-CM

## 2019-11-21 LAB
ALBUMIN SERPL BCP-MCNC: 3.7 G/DL (ref 3.5–5.2)
ALP SERPL-CCNC: 109 U/L (ref 55–135)
ALT SERPL W/O P-5'-P-CCNC: 11 U/L (ref 10–44)
ANION GAP SERPL CALC-SCNC: 8 MMOL/L (ref 8–16)
AST SERPL-CCNC: 19 U/L (ref 10–40)
BASOPHILS # BLD AUTO: 0.07 K/UL (ref 0–0.2)
BASOPHILS NFR BLD: 0.6 % (ref 0–1.9)
BILIRUB SERPL-MCNC: 0.5 MG/DL (ref 0.1–1)
BUN SERPL-MCNC: 17 MG/DL (ref 10–30)
CALCIUM SERPL-MCNC: 10 MG/DL (ref 8.7–10.5)
CHLORIDE SERPL-SCNC: 99 MMOL/L (ref 95–110)
CO2 SERPL-SCNC: 35 MMOL/L (ref 23–29)
CREAT SERPL-MCNC: 0.8 MG/DL (ref 0.5–1.4)
DIFFERENTIAL METHOD: ABNORMAL
EOSINOPHIL # BLD AUTO: 0.3 K/UL (ref 0–0.5)
EOSINOPHIL NFR BLD: 2.6 % (ref 0–8)
ERYTHROCYTE [DISTWIDTH] IN BLOOD BY AUTOMATED COUNT: 14 % (ref 11.5–14.5)
EST. GFR  (AFRICAN AMERICAN): >60 ML/MIN/1.73 M^2
EST. GFR  (NON AFRICAN AMERICAN): >60 ML/MIN/1.73 M^2
FOLATE SERPL-MCNC: 13.1 NG/ML (ref 4–24)
GLUCOSE SERPL-MCNC: 89 MG/DL (ref 70–110)
HCT VFR BLD AUTO: 49.5 % (ref 37–48.5)
HGB BLD-MCNC: 15 G/DL (ref 12–16)
IMM GRANULOCYTES # BLD AUTO: 0.07 K/UL (ref 0–0.04)
IMM GRANULOCYTES NFR BLD AUTO: 0.6 % (ref 0–0.5)
LYMPHOCYTES # BLD AUTO: 1.2 K/UL (ref 1–4.8)
LYMPHOCYTES NFR BLD: 10.3 % (ref 18–48)
MCH RBC QN AUTO: 32.1 PG (ref 27–31)
MCHC RBC AUTO-ENTMCNC: 30.3 G/DL (ref 32–36)
MCV RBC AUTO: 106 FL (ref 82–98)
MONOCYTES # BLD AUTO: 0.8 K/UL (ref 0.3–1)
MONOCYTES NFR BLD: 6.8 % (ref 4–15)
NEUTROPHILS # BLD AUTO: 9.4 K/UL (ref 1.8–7.7)
NEUTROPHILS NFR BLD: 79.1 % (ref 38–73)
NRBC BLD-RTO: 0 /100 WBC
PLATELET # BLD AUTO: 522 K/UL (ref 150–350)
PMV BLD AUTO: 9.4 FL (ref 9.2–12.9)
POTASSIUM SERPL-SCNC: 4.7 MMOL/L (ref 3.5–5.1)
PROT SERPL-MCNC: 7.2 G/DL (ref 6–8.4)
RBC # BLD AUTO: 4.67 M/UL (ref 4–5.4)
SODIUM SERPL-SCNC: 142 MMOL/L (ref 136–145)
TSH SERPL DL<=0.005 MIU/L-ACNC: 1.7 UIU/ML (ref 0.4–4)
VIT B12 SERPL-MCNC: 364 PG/ML (ref 210–950)
WBC # BLD AUTO: 11.9 K/UL (ref 3.9–12.7)

## 2019-11-21 PROCEDURE — 82746 ASSAY OF FOLIC ACID SERUM: CPT

## 2019-11-21 PROCEDURE — 85025 COMPLETE CBC W/AUTO DIFF WBC: CPT

## 2019-11-21 PROCEDURE — 1101F PR PT FALLS ASSESS DOC 0-1 FALLS W/OUT INJ PAST YR: ICD-10-PCS | Mod: CPTII,S$GLB,, | Performed by: FAMILY MEDICINE

## 2019-11-21 PROCEDURE — 99999 PR PBB SHADOW E&M-EST. PATIENT-LVL IV: ICD-10-PCS | Mod: PBBFAC,,, | Performed by: FAMILY MEDICINE

## 2019-11-21 PROCEDURE — 99999 PR PBB SHADOW E&M-EST. PATIENT-LVL IV: CPT | Mod: PBBFAC,,, | Performed by: FAMILY MEDICINE

## 2019-11-21 PROCEDURE — 1159F PR MEDICATION LIST DOCUMENTED IN MEDICAL RECORD: ICD-10-PCS | Mod: S$GLB,,, | Performed by: FAMILY MEDICINE

## 2019-11-21 PROCEDURE — 82607 VITAMIN B-12: CPT

## 2019-11-21 PROCEDURE — 99214 PR OFFICE/OUTPT VISIT, EST, LEVL IV, 30-39 MIN: ICD-10-PCS | Mod: S$GLB,,, | Performed by: FAMILY MEDICINE

## 2019-11-21 PROCEDURE — 1125F PR PAIN SEVERITY QUANTIFIED, PAIN PRESENT: ICD-10-PCS | Mod: S$GLB,,, | Performed by: FAMILY MEDICINE

## 2019-11-21 PROCEDURE — 99499 RISK ADDL DX/OHS AUDIT: ICD-10-PCS | Mod: S$GLB,,, | Performed by: FAMILY MEDICINE

## 2019-11-21 PROCEDURE — 80053 COMPREHEN METABOLIC PANEL: CPT

## 2019-11-21 PROCEDURE — 99499 UNLISTED E&M SERVICE: CPT | Mod: S$GLB,,, | Performed by: FAMILY MEDICINE

## 2019-11-21 PROCEDURE — 99214 OFFICE O/P EST MOD 30 MIN: CPT | Mod: S$GLB,,, | Performed by: FAMILY MEDICINE

## 2019-11-21 PROCEDURE — 1101F PT FALLS ASSESS-DOCD LE1/YR: CPT | Mod: CPTII,S$GLB,, | Performed by: FAMILY MEDICINE

## 2019-11-21 PROCEDURE — 84443 ASSAY THYROID STIM HORMONE: CPT

## 2019-11-21 PROCEDURE — 36415 COLL VENOUS BLD VENIPUNCTURE: CPT

## 2019-11-21 PROCEDURE — 1159F MED LIST DOCD IN RCRD: CPT | Mod: S$GLB,,, | Performed by: FAMILY MEDICINE

## 2019-11-21 PROCEDURE — 1125F AMNT PAIN NOTED PAIN PRSNT: CPT | Mod: S$GLB,,, | Performed by: FAMILY MEDICINE

## 2019-12-03 ENCOUNTER — SSC ENCOUNTER (OUTPATIENT)
Dept: ADMINISTRATIVE | Facility: OTHER | Age: 84
End: 2019-12-03

## 2019-12-03 NOTE — PROGRESS NOTES
Please note the following patient's information was forwarded to [facility] ([facility]) for case management and/or  on 12/03/2019.    Please see the media section of patient's chart for additional details.    Please contact Ext. [phone] with any questions.    Thank you,    Helen Blanco, [facility]  [place]  [phone]

## 2019-12-06 ENCOUNTER — TELEPHONE (OUTPATIENT)
Dept: PULMONOLOGY | Facility: CLINIC | Age: 84
End: 2019-12-06

## 2019-12-06 NOTE — TELEPHONE ENCOUNTER
----- Message from Juma Taylor MA sent at 12/6/2019  2:57 PM CST -----  Patient was seen in office with Dr. Ngo. She was having some issues with her oxygen mask. Could you please reach out regarding her mask.      Marek ADAIR

## 2019-12-13 ENCOUNTER — PATIENT MESSAGE (OUTPATIENT)
Dept: INTERNAL MEDICINE | Facility: CLINIC | Age: 84
End: 2019-12-13

## 2019-12-15 ENCOUNTER — PATIENT MESSAGE (OUTPATIENT)
Dept: INTERNAL MEDICINE | Facility: CLINIC | Age: 84
End: 2019-12-15

## 2019-12-16 ENCOUNTER — PATIENT MESSAGE (OUTPATIENT)
Dept: INTERNAL MEDICINE | Facility: CLINIC | Age: 84
End: 2019-12-16

## 2019-12-16 ENCOUNTER — OFFICE VISIT (OUTPATIENT)
Dept: INTERNAL MEDICINE | Facility: CLINIC | Age: 84
End: 2019-12-16
Payer: MEDICARE

## 2019-12-16 ENCOUNTER — HOSPITAL ENCOUNTER (OUTPATIENT)
Dept: RADIOLOGY | Facility: HOSPITAL | Age: 84
Discharge: HOME | End: 2019-12-16
Attending: FAMILY MEDICINE
Payer: MEDICARE

## 2019-12-16 ENCOUNTER — TELEPHONE (OUTPATIENT)
Dept: SPORTS MEDICINE | Facility: CLINIC | Age: 84
End: 2019-12-16

## 2019-12-16 VITALS
DIASTOLIC BLOOD PRESSURE: 68 MMHG | OXYGEN SATURATION: 93 % | BODY MASS INDEX: 24.37 KG/M2 | SYSTOLIC BLOOD PRESSURE: 134 MMHG | HEART RATE: 91 BPM | HEIGHT: 66 IN | TEMPERATURE: 99 F

## 2019-12-16 DIAGNOSIS — M54.50 ACUTE LOW BACK PAIN WITHOUT SCIATICA, UNSPECIFIED BACK PAIN LATERALITY: ICD-10-CM

## 2019-12-16 DIAGNOSIS — J98.11 ATELECTASIS: ICD-10-CM

## 2019-12-16 DIAGNOSIS — R10.32 LLQ PAIN: Primary | ICD-10-CM

## 2019-12-16 DIAGNOSIS — S32.000A COMPRESSION FRACTURE OF LUMBAR VERTEBRA, INITIAL ENCOUNTER, UNSPECIFIED LUMBAR VERTEBRAL LEVEL: Primary | ICD-10-CM

## 2019-12-16 DIAGNOSIS — R10.32 LLQ PAIN: ICD-10-CM

## 2019-12-16 PROCEDURE — 74018 XR ABDOMEN AP 1 VIEW: ICD-10-PCS | Mod: 26,,, | Performed by: RADIOLOGY

## 2019-12-16 PROCEDURE — 1159F PR MEDICATION LIST DOCUMENTED IN MEDICAL RECORD: ICD-10-PCS | Mod: S$GLB,,, | Performed by: FAMILY MEDICINE

## 2019-12-16 PROCEDURE — 72110 X-RAY EXAM L-2 SPINE 4/>VWS: CPT | Mod: TC

## 2019-12-16 PROCEDURE — 71046 X-RAY EXAM CHEST 2 VIEWS: CPT | Mod: TC

## 2019-12-16 PROCEDURE — 71046 X-RAY EXAM CHEST 2 VIEWS: CPT | Mod: 26,,, | Performed by: RADIOLOGY

## 2019-12-16 PROCEDURE — 1159F MED LIST DOCD IN RCRD: CPT | Mod: S$GLB,,, | Performed by: FAMILY MEDICINE

## 2019-12-16 PROCEDURE — 1125F PR PAIN SEVERITY QUANTIFIED, PAIN PRESENT: ICD-10-PCS | Mod: S$GLB,,, | Performed by: FAMILY MEDICINE

## 2019-12-16 PROCEDURE — 72110 X-RAY EXAM L-2 SPINE 4/>VWS: CPT | Mod: 26,,, | Performed by: RADIOLOGY

## 2019-12-16 PROCEDURE — 99214 OFFICE O/P EST MOD 30 MIN: CPT | Mod: S$GLB,,, | Performed by: FAMILY MEDICINE

## 2019-12-16 PROCEDURE — 99999 PR PBB SHADOW E&M-EST. PATIENT-LVL IV: CPT | Mod: PBBFAC,,, | Performed by: FAMILY MEDICINE

## 2019-12-16 PROCEDURE — 99999 PR PBB SHADOW E&M-EST. PATIENT-LVL IV: ICD-10-PCS | Mod: PBBFAC,,, | Performed by: FAMILY MEDICINE

## 2019-12-16 PROCEDURE — 71046 XR CHEST PA AND LATERAL: ICD-10-PCS | Mod: 26,,, | Performed by: RADIOLOGY

## 2019-12-16 PROCEDURE — 74018 RADEX ABDOMEN 1 VIEW: CPT | Mod: 26,,, | Performed by: RADIOLOGY

## 2019-12-16 PROCEDURE — 74018 RADEX ABDOMEN 1 VIEW: CPT | Mod: TC

## 2019-12-16 PROCEDURE — 99214 PR OFFICE/OUTPT VISIT, EST, LEVL IV, 30-39 MIN: ICD-10-PCS | Mod: S$GLB,,, | Performed by: FAMILY MEDICINE

## 2019-12-16 PROCEDURE — 1101F PR PT FALLS ASSESS DOC 0-1 FALLS W/OUT INJ PAST YR: ICD-10-PCS | Mod: CPTII,S$GLB,, | Performed by: FAMILY MEDICINE

## 2019-12-16 PROCEDURE — 1101F PT FALLS ASSESS-DOCD LE1/YR: CPT | Mod: CPTII,S$GLB,, | Performed by: FAMILY MEDICINE

## 2019-12-16 PROCEDURE — 1125F AMNT PAIN NOTED PAIN PRSNT: CPT | Mod: S$GLB,,, | Performed by: FAMILY MEDICINE

## 2019-12-16 PROCEDURE — 72110 XR LUMBAR SPINE COMPLETE 5 VIEW: ICD-10-PCS | Mod: 26,,, | Performed by: RADIOLOGY

## 2019-12-16 RX ORDER — DOXYCYCLINE HYCLATE 100 MG
100 TABLET ORAL EVERY 12 HOURS
Qty: 14 TABLET | Refills: 0 | Status: SHIPPED | OUTPATIENT
Start: 2019-12-16 | End: 2019-12-23

## 2019-12-16 NOTE — PROGRESS NOTES
Ashley Koenig  12/16/2019  [id number]    Maggy Ngo MD  Patient Care Team:  Maggy Ngo MD as PCP - General (Family Medicine)  Anoop Snyder MD as Consulting Physician (Hematology and Oncology)  Luz Marina Mccoy LPN as Care Coordinator (Internal Medicine)  Has the patient seen any provider outside of the [facility] network since the last visit? (no). If yes, HIPPA forms completed and records requested.        Visit Type:an urgent visit for a new problem    Chief Complaint:  Chief Complaint   Patient presents with    Abdominal Pain       History of Present Illness:  89 year old here for LLQ pain.  Patient Son with her.  C/o of a Burning pain, LLQ and in hip.  Had BM yesterday. Son does not know if it was a large one or if she is constipated.    Answers for HPI/ROS submitted by the patient on 12/15/2019   Abdominal pain  Chronicity: new  Onset: yesterday  Onset quality: undetermined  Frequency: constantly  Episode duration: 2 days  Progression since onset: unchanged  Pain location: LLQ  Pain - numeric: 5/10  Pain quality: burning  anorexia: No  arthralgias: No  belching: No  flatus: No  hematochezia: No  Aggravated by: nothing  Relieved by: nothing  Pain severity: moderate  Treatments tried: nothing  Improvement on treatment: no relief  abdominal surgery: No  colon cancer: No  Crohn's disease: No  gallstones: No  GERD: No  irritable bowel syndrome: No  kidney stones: No  pancreatitis: No  PUD: No  ulcerative colitis: No  UTI: Yes    She has dementia. She is hard of hearing.  Son primary care taker.  Reports sleeping more, hard to get up.  C/O back pain.    She has hearing aids    History of Hypoxemia- should be on oxygen per Pulm. Doesn't like to wear it.   Aspirates with eating, coughing. She has had swallow study, needs thickened feeds.        History:  Past Medical History:   Diagnosis Date    Acute congestive heart failure 3/6/2019    Age-related macular degeneration, wet, both eyes     Anginal  equivalent     Anxiety     Arthritis     BPPV (benign paroxysmal positional vertigo)     Brain tumor     Clotting disorder     lung    COPD (chronic obstructive pulmonary disease)     Depression     Diastolic dysfunction     Hearing loss     Heart disease     Hyperlipidemia     Hypertension     Hypoxemia     Lung nodule     Meningioma     Multiple thyroid nodules     Pulmonary embolism     Pulmonary hypertension     Sepsis due to pneumonia     Sleep apnea in adult     Stroke     per eye exam, never been treated for    Urinary incontinence     Vitamin D deficiency      Past Surgical History:   Procedure Laterality Date    CATARACT EXTRACTION Bilateral     TOTAL HIP ARTHROPLASTY Right 08/20/2016    aafter fx     Family History   Problem Relation Age of Onset    Alzheimer's disease Father     Alzheimer's disease Daughter     No Known Problems Son      Social History     Socioeconomic History    Marital status:      Spouse name: Not on file    Number of children: Not on file    Years of education: Not on file    Highest education level: Not on file   Occupational History    Not on file   Social Needs    Financial resource strain: Not hard at all    Food insecurity:     Worry: Not on file     Inability: Not on file    Transportation needs:     Medical: Not on file     Non-medical: Not on file   Tobacco Use    Smoking status: Never Smoker    Smokeless tobacco: Never Used   Substance and Sexual Activity    Alcohol use: No    Drug use: No    Sexual activity: Not Currently   Lifestyle    Physical activity:     Days per week: 0 days     Minutes per session: 0 min    Stress: Rather much   Relationships    Social connections:     Talks on phone: Three times a week     Gets together: Never     Attends Oriental orthodox service: Not on file     Active member of club or organization: No     Attends meetings of clubs or organizations: Never     Relationship status:    Other Topics  Concern    Not on file   Social History Narrative    Patient recently moved from [place] to [place] in August 2017. She had previously lived in [place] her whole life. , 5 children (4 alive). Son Mook has medical POA. Does not drive.      Patient Active Problem List   Diagnosis    Meningioma    History of pulmonary embolism    Coronary artery disease involving native coronary artery of native heart without angina pectoris    Multiple thyroid nodules    Anxiety and depression    Atherosclerosis of aorta    Hyperlipidemia    Diastolic dysfunction    Macular degeneration    Vitamin D deficiency    BPPV (benign paroxysmal positional vertigo)    Hearing loss    Dementia    Dysphagia, oropharyngeal    Nocturnal hypoxemia     Review of patient's allergies indicates:  No Known Allergies    The following were reviewed at this visit: active problem list, medication list, allergies, family history, social history, and health maintenance.    Medications:  Current Outpatient Medications on File Prior to Visit   Medication Sig Dispense Refill    aspirin (ECOTRIN) 81 MG EC tablet Take 1 tablet (81 mg total) by mouth once daily.  0    escitalopram oxalate (LEXAPRO) 20 MG tablet TAKE 1 TABLET ONCE DAILY 90 tablet 0    MULTIVITAMIN,THER AND MINERALS (VITAMINS AND MINERALS ORAL) Take by mouth once daily.      alprazolam (XANAX) 0.25 MG tablet Take 0.25 mg by mouth 2 (two) times daily as needed for Anxiety.       No current facility-administered medications on file prior to visit.        Medications have been reviewed and reconciled with patient at this visit.  Barriers to medications present (no)    Adverse reactions to current medications (no)    Over the counter medications reviewed (Yes ), and if needed added to active Medication list at this visit.     Exam:  Wt Readings from Last 3 Encounters:   11/21/19 68.5 kg (151 lb 0.2 oz)   11/07/19 68.5 kg (151 lb 0.2 oz)   07/10/19 70.3 kg (155 lb)     Temp  Readings from Last 3 Encounters:   12/16/19 99 °F (37.2 °C)   11/21/19 99 °F (37.2 °C) (Tympanic)   11/07/19 100.2 °F (37.9 °C) (Tympanic)     BP Readings from Last 3 Encounters:   12/16/19 134/68   11/21/19 (!) 140/76   11/07/19 130/66     Pulse Readings from Last 3 Encounters:   12/16/19 91   11/21/19 79   11/07/19 73     Body mass index is 24.37 kg/m².      Review of Systems   Constitutional: Negative for fever and weight loss.   HENT: Positive for hearing loss.    Gastrointestinal: Positive for abdominal pain and constipation. Negative for diarrhea, melena, nausea and vomiting.   Genitourinary: Negative for dysuria, frequency and hematuria.   Musculoskeletal: Negative for myalgias.   Neurological: Negative for headaches.   Psychiatric/Behavioral: Positive for memory loss.     Physical Exam   HENT:   Head: Normocephalic and atraumatic.   Eyes: Pupils are equal, round, and reactive to light.   Neck: Normal range of motion.   Cardiovascular: Normal rate and regular rhythm.   Pulmonary/Chest: Effort normal.   Abdominal: Bowel sounds are decreased. There is tenderness.       Burning sensation  NO CVA tenderness   Neurological: She is alert.   Psychiatric: Her mood appears anxious. Cognition and memory are impaired. She exhibits abnormal recent memory and abnormal remote memory.   Vitals reviewed.      Laboratory Reviewed ({N/A)  Lab Results   Component Value Date    WBC 11.90 11/21/2019    HGB 15.0 11/21/2019    HCT 49.5 (H) 11/21/2019     (H) 11/21/2019    CHOL 219 (A) 01/13/2016    TRIG 141 01/13/2016    HDL 52 01/13/2016    ALT 11 11/21/2019    AST 19 11/21/2019     11/21/2019    K 4.7 11/21/2019    CL 99 11/21/2019    CREATININE 0.8 11/21/2019    BUN 17 11/21/2019    CO2 35 (H) 11/21/2019    TSH 1.700 11/21/2019    INR 1.0 03/06/2019       Ashley was seen today for abdominal pain.    Diagnoses and all orders for this visit:    LLQ pain  -     CBC auto differential; Future  -     Comprehensive  metabolic panel; Future  -     X-Ray Abdomen AP 1 View; Future  -     Urinalysis  -     Urine culture    Acute low back pain without sciatica, unspecified back pain laterality  -     Urinalysis  -     Urine culture  -     X-Ray Chest PA And Lateral; Future  -     X-Ray Lumbar Spine Complete 5 View; Future                Care Plan/Goals: Reviewed  (Yes)  Goals    None         Follow up: No follow-ups on file.    After visit summary was printed and given to patient upon discharge today.  Patient goals and care plan are included in After Visit Summary.    Answers for HPI/ROS submitted by the patient on 12/15/2019   Abdominal pain  Chronicity: new  Onset: yesterday  Onset quality: undetermined  Frequency: constantly  Episode duration: 2 days  Progression since onset: unchanged  Pain location: LLQ  Pain - numeric: 5/10  Pain quality: burning  anorexia: No  arthralgias: No  belching: No  flatus: No  hematochezia: No  Aggravated by: nothing  Relieved by: nothing  Pain severity: moderate  Treatments tried: nothing  Improvement on treatment: no relief  abdominal surgery: No  colon cancer: No  Crohn's disease: No  gallstones: No  GERD: No  irritable bowel syndrome: No  kidney stones: No  pancreatitis: No  PUD: No  ulcerative colitis: No  UTI: Yes

## 2019-12-16 NOTE — PROGRESS NOTES
Her Xray of lumbar spine shows new compression fractures on T12 to L1 which was not there prior.   This her reason for pain, and I believe the burning pain.  They suggested an MRI, but I do not think she can sit that long through this.  I will send this Xray to [facility] to see if they would recommend pain management or need to see her for the compression fractures.

## 2019-12-16 NOTE — PROGRESS NOTES
Call son about the xray.  She has opacities in the lung bases, could be atelectasis or infiltrate.     I will treat her with Doxycycline antibiotics.

## 2019-12-16 NOTE — PROGRESS NOTES
Xray shows moderate amount of gas and stool in the colon.  No obstruction.    She can use Miralax daily to have soft BM to clear out.

## 2019-12-16 NOTE — PROGRESS NOTES
89 year old, dementia, deconditioned.   Over last 2 weeks back pain.  FAmily finally able to bring in, new compression fractures.  Doubtful she can sit for an MRI? Any recommendations? Worried with dementia, given any narcotics.  Family is worried about how she complains of pain.  I know we don't have spine ortho, but do you deal with the compression fractures?

## 2019-12-17 ENCOUNTER — PATIENT MESSAGE (OUTPATIENT)
Dept: INTERNAL MEDICINE | Facility: CLINIC | Age: 84
End: 2019-12-17

## 2019-12-17 ENCOUNTER — TELEPHONE (OUTPATIENT)
Dept: SPORTS MEDICINE | Facility: CLINIC | Age: 84
End: 2019-12-17

## 2019-12-17 ENCOUNTER — TELEPHONE (OUTPATIENT)
Dept: NEUROSURGERY | Facility: CLINIC | Age: 84
End: 2019-12-17

## 2019-12-17 NOTE — TELEPHONE ENCOUNTER
Spoke w/ patient son asked did patient have any outside MRI done with 6 mths to 1 year. Son states only what's in [facility] records, which is only a Lumbar Spine xray.    Advised patient that MD requires that patient have an MRI within 6 mths to 1 year before moving forth with him, MD would like patient to reschedule w/ PA.      Offered same day as already schedule appt w/ PA-RACHEAL Vila instead on [facility]. Patient son declined stating he will call back at a later date to reschedule appt for his mom .

## 2019-12-17 NOTE — TELEPHONE ENCOUNTER
Left vm in regards to neurosurgery referral and scheduling apt Left call back phone number for patient to call to schedule an appointment.

## 2019-12-19 ENCOUNTER — PATIENT MESSAGE (OUTPATIENT)
Dept: INTERNAL MEDICINE | Facility: CLINIC | Age: 84
End: 2019-12-19

## 2019-12-19 ENCOUNTER — OFFICE VISIT (OUTPATIENT)
Dept: PAIN MEDICINE | Facility: CLINIC | Age: 84
End: 2019-12-19
Payer: MEDICARE

## 2019-12-19 ENCOUNTER — TELEPHONE (OUTPATIENT)
Dept: SPORTS MEDICINE | Facility: CLINIC | Age: 84
End: 2019-12-19

## 2019-12-19 VITALS
WEIGHT: 151 LBS | DIASTOLIC BLOOD PRESSURE: 75 MMHG | HEIGHT: 66 IN | HEART RATE: 89 BPM | BODY MASS INDEX: 24.27 KG/M2 | SYSTOLIC BLOOD PRESSURE: 144 MMHG

## 2019-12-19 DIAGNOSIS — S32.010A COMPRESSION FRACTURE OF L1 LUMBAR VERTEBRA, CLOSED, INITIAL ENCOUNTER: Primary | ICD-10-CM

## 2019-12-19 DIAGNOSIS — N39.0 URINARY TRACT INFECTION WITHOUT HEMATURIA, SITE UNSPECIFIED: ICD-10-CM

## 2019-12-19 DIAGNOSIS — S22.080A COMPRESSION FRACTURE OF T12 VERTEBRA, INITIAL ENCOUNTER: ICD-10-CM

## 2019-12-19 DIAGNOSIS — S32.000A COMPRESSION FRACTURE OF LUMBAR VERTEBRA, INITIAL ENCOUNTER, UNSPECIFIED LUMBAR VERTEBRAL LEVEL: Primary | ICD-10-CM

## 2019-12-19 PROCEDURE — 99204 OFFICE O/P NEW MOD 45 MIN: CPT | Mod: S$GLB,,, | Performed by: PHYSICAL MEDICINE & REHABILITATION

## 2019-12-19 PROCEDURE — 99204 PR OFFICE/OUTPT VISIT, NEW, LEVL IV, 45-59 MIN: ICD-10-PCS | Mod: S$GLB,,, | Performed by: PHYSICAL MEDICINE & REHABILITATION

## 2019-12-19 PROCEDURE — 3288F FALL RISK ASSESSMENT DOCD: CPT | Mod: CPTII,S$GLB,, | Performed by: PHYSICAL MEDICINE & REHABILITATION

## 2019-12-19 PROCEDURE — 1159F PR MEDICATION LIST DOCUMENTED IN MEDICAL RECORD: ICD-10-PCS | Mod: S$GLB,,, | Performed by: PHYSICAL MEDICINE & REHABILITATION

## 2019-12-19 PROCEDURE — 1100F PTFALLS ASSESS-DOCD GE2>/YR: CPT | Mod: CPTII,S$GLB,, | Performed by: PHYSICAL MEDICINE & REHABILITATION

## 2019-12-19 PROCEDURE — 1159F MED LIST DOCD IN RCRD: CPT | Mod: S$GLB,,, | Performed by: PHYSICAL MEDICINE & REHABILITATION

## 2019-12-19 PROCEDURE — 99499 UNLISTED E&M SERVICE: CPT | Mod: S$GLB,,, | Performed by: PHYSICAL MEDICINE & REHABILITATION

## 2019-12-19 PROCEDURE — 1100F PR PT FALLS ASSESS DOC 2+ FALLS/FALL W/INJURY/YR: ICD-10-PCS | Mod: CPTII,S$GLB,, | Performed by: PHYSICAL MEDICINE & REHABILITATION

## 2019-12-19 PROCEDURE — 3288F PR FALLS RISK ASSESSMENT DOCUMENTED: ICD-10-PCS | Mod: CPTII,S$GLB,, | Performed by: PHYSICAL MEDICINE & REHABILITATION

## 2019-12-19 PROCEDURE — 1125F AMNT PAIN NOTED PAIN PRSNT: CPT | Mod: S$GLB,,, | Performed by: PHYSICAL MEDICINE & REHABILITATION

## 2019-12-19 PROCEDURE — 99499 RISK ADDL DX/OHS AUDIT: ICD-10-PCS | Mod: S$GLB,,, | Performed by: PHYSICAL MEDICINE & REHABILITATION

## 2019-12-19 PROCEDURE — 1125F PR PAIN SEVERITY QUANTIFIED, PAIN PRESENT: ICD-10-PCS | Mod: S$GLB,,, | Performed by: PHYSICAL MEDICINE & REHABILITATION

## 2019-12-19 PROCEDURE — 99999 PR PBB SHADOW E&M-EST. PATIENT-LVL IV: CPT | Mod: PBBFAC,,, | Performed by: PHYSICAL MEDICINE & REHABILITATION

## 2019-12-19 PROCEDURE — 99999 PR PBB SHADOW E&M-EST. PATIENT-LVL IV: ICD-10-PCS | Mod: PBBFAC,,, | Performed by: PHYSICAL MEDICINE & REHABILITATION

## 2019-12-19 RX ORDER — ARM BRACE
1 EACH MISCELLANEOUS DAILY PRN
Qty: 1 EACH | Refills: 0 | Status: SHIPPED | OUTPATIENT
Start: 2019-12-19

## 2019-12-19 RX ORDER — CEPHALEXIN 500 MG/1
500 CAPSULE ORAL EVERY 12 HOURS
Qty: 10 CAPSULE | Refills: 0 | Status: SHIPPED | OUTPATIENT
Start: 2019-12-19 | End: 2019-12-19

## 2019-12-19 RX ORDER — TRAMADOL HYDROCHLORIDE 50 MG/1
TABLET ORAL
Qty: 28 TABLET | Refills: 0 | Status: SHIPPED | OUTPATIENT
Start: 2019-12-19

## 2019-12-19 RX ORDER — CEPHALEXIN 500 MG/1
500 CAPSULE ORAL EVERY 12 HOURS
Qty: 10 CAPSULE | Refills: 0 | Status: ON HOLD | OUTPATIENT
Start: 2019-12-19 | End: 2019-12-31 | Stop reason: HOSPADM

## 2019-12-19 NOTE — LETTER
December 19, 2019      Maggy Ngo MD  [address] [facility] [address]             [address] [facility] [address]  Phone: [phone]  Fax: [phone]          Patient: Ashley Koenig   MR Number: [id number]   YOB: 1926   Date of Visit: 12/19/2019       Dear Dr. Maggy Ngo:    Thank you for referring Ashley Koenig to me for evaluation. Attached you will find relevant portions of my assessment and plan of care.    If you have questions, please do not hesitate to call me. I look forward to following Ashley Koenig along with you.    Sincerely,    uJarez Brown MD    Enclosure  CC:  No Recipients    If you would like to receive this communication electronically, please contact [email or web address] or [phone] to request more information on [facility] Link access.    For providers and/or their staff who would like to refer a patient to [facility], please contact us through our one-stop-shop provider referral line, [facility], at [phone].    If you feel you have received this communication in error or would no longer like to receive these types of communications, please e-mail [email or web address]

## 2019-12-19 NOTE — PATIENT INSTRUCTIONS
- will order an evaluation for a brace to aid in non operative treatment of compression fractures  - Continue current medications as prescribed.  We will provide tramadol 50 mg to take half a tablet once or twice daily as needed for pain control.  - Continue home based exercises and discussed the importance of a healthy and active lifestyle  - Return for follow up on an as needed basis.    Please do not hesitate to contact the clinic if you have any questions regarding your treatment plan.     Juarez Brown MD  Interventional Pain Medicine  [facility] [place]

## 2019-12-19 NOTE — PROGRESS NOTES
New Patient Chronic Pain Note (Initial Visit)    Referring Physician: Maggy Ngo MD    PCP: Maggy Ngo MD    Chief Complaint:   Chief Complaint   Patient presents with    Consult    Low-back Pain        SUBJECTIVE:  Patient is accompanied by her son today who is the medical power of .  He provided majority of the history.  Patient has dementia.     Ashley Koenig is a 89 y.o. female who presents to the clinic for the evaluation of lower back pain secondary to compression fracture.  She was referred by her primary care provider for evaluation management of this above pain. The pain started about 3 weeks ago following a fall and symptoms have been worsening.The pain is located in the lower lumbar area without radiation.  The pain is described as aching and sharp and is rated as 9/10. The pain is rated with a score of  8/10 on the BEST day and a score of 10/10 on the WORST day.  Symptoms interfere with daily activity and sleeping. The pain is exacerbated by movement.  The pain is mitigated by laying down. The patient reports spending 6-8 hours per day reclining. The patient reports 6-8 hours of uninterrupted sleep per night.      Patient denies night fever/night sweats, urinary incontinence, bowel incontinence, significant weight loss, significant motor weakness and loss of sensations.    Pain Disability Index Review:   No flowsheet data found.    Non-Pharmacologic Treatments:  Physical Therapy/Home Exercise: no unable to tolerate and difficulty with transportation  Ice/Heat:yes  TENS: no  Acupuncture: no  Massage: no  Chiropractic: no    Other: no      Pain Medications:  - Adjuvant Medications: Xanax, Lexapro  - Anti-Coagulants: Aspirin     report:  Reviewed and consistent with medication use as prescribed.  No recently prescribed controlled substances.    Pain Procedures:   Denies      Imaging:   No MRI of the lumbar spine available     X-ray lumbar spine 12/16/2019:  Severe chronic  compression deformity of the T10 vertebral body appears unchanged from prior CT.  There are also severe compression fracture deformities involving the T12 and L1 vertebral bodies which were not present on prior CT with some probable associated mild retropulsion.  Visualized osseous structures are diffusely osteopenic.  No definite spondylolisthesis demonstrated.  There is mild-to-moderate disc height loss at L4-5.  Posterior elements appear grossly intact.  No pars defects visualized.  The remaining visualized osseous and soft tissue structures demonstrate no appreciable abnormality.    Past Medical History:   Diagnosis Date    Acute congestive heart failure 3/6/2019    Age-related macular degeneration, wet, both eyes     Anginal equivalent     Anxiety     Arthritis     BPPV (benign paroxysmal positional vertigo)     Brain tumor     Clotting disorder     lung    COPD (chronic obstructive pulmonary disease)     Depression     Diastolic dysfunction     Hearing loss     Heart disease     Hyperlipidemia     Hypertension     Hypoxemia     Lung nodule     Meningioma     Multiple thyroid nodules     Pulmonary embolism     Pulmonary hypertension     Sepsis due to pneumonia     Sleep apnea in adult     Stroke     per eye exam, never been treated for    Urinary incontinence     Vitamin D deficiency      Past Surgical History:   Procedure Laterality Date    CATARACT EXTRACTION Bilateral     TOTAL HIP ARTHROPLASTY Right 08/20/2016    aafter fx     Social History     Socioeconomic History    Marital status:      Spouse name: Not on file    Number of children: Not on file    Years of education: Not on file    Highest education level: Not on file   Occupational History    Not on file   Social Needs    Financial resource strain: Not hard at all    Food insecurity:     Worry: Not on file     Inability: Not on file    Transportation needs:     Medical: Not on file     Non-medical: Not on  file   Tobacco Use    Smoking status: Never Smoker    Smokeless tobacco: Never Used   Substance and Sexual Activity    Alcohol use: No    Drug use: No    Sexual activity: Not Currently   Lifestyle    Physical activity:     Days per week: 0 days     Minutes per session: 0 min    Stress: Rather much   Relationships    Social connections:     Talks on phone: Three times a week     Gets together: Never     Attends [employer] service: Not on file     Active member of club or organization: No     Attends meetings of clubs or organizations: Never     Relationship status:    Other Topics Concern    Not on file   Social History Narrative    Patient recently moved from [place] to [place] in August 2017. She had previously lived in [place] her whole life. , 5 children (4 alive). Son Mook has medical POA. Does not drive.      Family History   Problem Relation Age of Onset    Alzheimer's disease Father     Alzheimer's disease Daughter     No Known Problems Son        Review of patient's allergies indicates:  No Known Allergies    Current Outpatient Medications   Medication Sig    aspirin (ECOTRIN) 81 MG EC tablet Take 1 tablet (81 mg total) by mouth once daily.    cephALEXin (KEFLEX) 500 MG capsule Take 1 capsule (500 mg total) by mouth every 12 (twelve) hours.    doxycycline (VIBRA-TABS) 100 MG tablet Take 1 tablet (100 mg total) by mouth every 12 (twelve) hours. for 7 days    escitalopram oxalate (LEXAPRO) 20 MG tablet TAKE 1 TABLET ONCE DAILY    MULTIVITAMIN,THER AND MINERALS (VITAMINS AND MINERALS ORAL) Take by mouth once daily.    alprazolam (XANAX) 0.25 MG tablet Take 0.25 mg by mouth 2 (two) times daily as needed for Anxiety.    back brace Misc 1 Device by Misc.(Non-Drug; Combo Route) route daily as needed.    traMADol (ULTRAM) 50 mg tablet Take 1/2 to 1 tab PO q6 PRN pain.     No current facility-administered medications for this visit.        Review of Systems     GENERAL:  No weight  "loss, malaise or fevers.  HEENT:   No recent changes in vision or hearing  NECK:  Negative for lumps, no difficulty with swallowing.  RESPIRATORY:  Negative for cough, wheezing or shortness of breath, patient denies any recent URI.  CARDIOVASCULAR:  Negative for chest pain, leg swelling or palpitations.  GI:  Negative for abdominal discomfort, blood in stools or black stools or change in bowel habits.  MUSCULOSKELETAL:  See HPI.  SKIN:  Negative for lesions, rash, and itching.  PSYCH: + Dementia. No mood disorder or recent psychosocial stressors.  Patients sleep is not disturbed secondary to pain.  HEMATOLOGY/LYMPHOLOGY:  Negative for prolonged bleeding, bruising easily or swollen nodes.  Patient is currently taking anti-coagulants - ASA  NEURO:   No history of headaches, syncope, paralysis, seizures or tremors.  All other reviewed and negative other than HPI.    OBJECTIVE:    BP (!) 144/75   Pulse 89   Ht 5' 6" (1.676 m)   Wt 68.5 kg (151 lb 0.2 oz)   BMI 24.37 kg/m²         Physical Exam    GENERAL:  Frail appearing, in no acute distress, dementia  PSYCH:  Mood and affect appropriate.  SKIN: Skin color, texture, turgor normal, no rashes or lesions.  HEAD/FACE:  Normocephalic, atraumatic. Cranial nerves grossly intact.  CV: RRR with palpation of the radial artery.  PULM: No evidence of respiratory difficulty, symmetric chest rise.  GI:  Soft and non-tender.  BACK:  Kyphotic posture. Straight leg raising in the sitting and supine positions is negative to radicular pain. Tenderness to percussion of the thoracolumbar region.  Mild to moderate pain to palpation over the facet joints of the lumbar spine and spinous processes.  Deferred range of motion testing of the lumbar spine secondary to pain and limited mobility  EXTREMITIES: Peripheral joint ROM is full and pain free without obvious instability or laxity in all four extremities. No deformities, edema, or skin discoloration. Good capillary " refill.  MUSCULOSKELETAL: Shoulder, hip, and knee provocative maneuvers are negative.  There is no pain with palpation over the sacroiliac joints bilaterally.  FABERs test is negative.  FADIRs test is negative.   Bilateral upper and lower extremity strength is normal and symmetric.  No atrophy or tone abnormalities are noted.  NEURO: Bilateral upper and lower extremity coordination and muscle stretch reflexes are physiologic and symmetric.  Plantar response are downgoing. No clonus.  No loss of sensation is noted.  GAIT:  Antalgic and in manual wheelchair today.      LABS:  Lab Results   Component Value Date    WBC 14.78 (H) 12/16/2019    HGB 14.9 12/16/2019    HCT 47.4 12/16/2019     (H) 12/16/2019     (H) 12/16/2019       CMP  Sodium   Date Value Ref Range Status   12/16/2019 141 136 - 145 mmol/L Final     Potassium   Date Value Ref Range Status   12/16/2019 4.4 3.5 - 5.1 mmol/L Final     Chloride   Date Value Ref Range Status   12/16/2019 97 95 - 110 mmol/L Final     CO2   Date Value Ref Range Status   12/16/2019 32 (H) 23 - 29 mmol/L Final     Glucose   Date Value Ref Range Status   12/16/2019 98 70 - 110 mg/dL Final     BUN, Bld   Date Value Ref Range Status   12/16/2019 13 10 - 30 mg/dL Final     Creatinine   Date Value Ref Range Status   12/16/2019 0.8 0.5 - 1.4 mg/dL Final     Calcium   Date Value Ref Range Status   12/16/2019 10.3 8.7 - 10.5 mg/dL Final     Total Protein   Date Value Ref Range Status   12/16/2019 7.4 6.0 - 8.4 g/dL Final     Albumin   Date Value Ref Range Status   12/16/2019 3.5 3.5 - 5.2 g/dL Final     Total Bilirubin   Date Value Ref Range Status   12/16/2019 0.6 0.1 - 1.0 mg/dL Final     Comment:     For infants and newborns, interpretation of results should be based  on gestational age, weight and in agreement with clinical  observations.  Premature Infant recommended reference ranges:  Up to 24 hours.............<8.0 mg/dL  Up to 48 hours............<12.0 mg/dL  3-5  days..................<15.0 mg/dL  6-29 days.................<15.0 mg/dL       Alkaline Phosphatase   Date Value Ref Range Status   12/16/2019 127 55 - 135 U/L Final     AST   Date Value Ref Range Status   12/16/2019 20 10 - 40 U/L Final     ALT   Date Value Ref Range Status   12/16/2019 13 10 - 44 U/L Final     Anion Gap   Date Value Ref Range Status   12/16/2019 12 8 - 16 mmol/L Final     eGFR if    Date Value Ref Range Status   12/16/2019 >60 >60 mL/min/1.73 m^2 Final     eGFR if non    Date Value Ref Range Status   12/16/2019 >60 >60 mL/min/1.73 m^2 Final     Comment:     Calculation used to obtain the estimated glomerular filtration  rate (eGFR) is the CKD-EPI equation.          No results found for: LABA1C, HGBA1C          ASSESSMENT: 89 y.o. year old female with low back pain, consistent with     1. Compression fracture of L1 lumbar vertebra, closed, initial encounter  traMADol (ULTRAM) 50 mg tablet    back brace Misc   2. Compression fracture of T12 vertebra, initial encounter  traMADol (ULTRAM) 50 mg tablet    back brace Misc   3. Urinary tract infection without hematuria, site unspecified  cephALEXin (KEFLEX) 500 MG capsule         PLAN:   - Interventions: Discussed the potential for kyphoplasty to treat these compression fractures.  Expressed that we need to obtain an MRI to further evaluate the acuity of the compression fractures, but the patient's son at both defer this treatment option at this time therefore we will not obtain an MRI either.  Her son believes that she would not be able the tolerate the MRI completely or the kyphoplasty for that matter. I provided information on the kyphoplasty for the son if they were to reconsider the treatment options.    - Anticoagulation use: yes aspirin    - will provide referral for TLSO bracing to assist with non operative treatment of compression fractures.    - Medications: I have stressed the importance of physical activity and  a home exercise plan to help with pain and improve health., Patient can continue with medications for now since they are providing benefits, using them appropriately, and without side effects. and Start Tramadol 25-50mg every 6 hours as needed for severe pain and to limit opioid use.  Reviewed  is consistent with patient history and there is no aberrant drug behavior.  Patient's primary care provider also had ordered Keflex for UTI, by change the order to be sent to pharmacy of patient's choice today as well.    - Therapy:  Continue activities as tolerated    - Psychological:  Patient with advanced dementia and living with her son and their own home.    - Labs:  Reviewed    - Imaging: Reviewed available imaging with patient and answered any questions they had regarding study.  Consider MRI of the lumbar spine to further evaluate compression fractures if we were to go forth with kyphoplasty plan.    - Consults/Referrals:  None at this time    - Records:  Reviewed/Obtain old records from outside physicians and imaging    - Follow up visit: return to clinic on as needed basis    - Counseled patient regarding the importance of activity modification and physical therapy    - This condition does not require this patient to take time off of work, and the primary goal of our Pain Management services is to improve the patient's functional capacity.    - Patient Questions: Answered all of the patient's questions regarding diagnosis, therapy, and treatment        The above plan and management options were discussed at length with patient. Patient is in agreement with the above and verbalized understanding.    I discussed the goals of interventional chronic pain management with the patient on today's visit.  I explained the utility of injections for diagnostic and therapeutic purposes.  We discussed a multimodal approach to pain including treating the patient's given worst pain at any given time.  We will use a systematic  approach to addressing pain.  We will also adopt a multimodal approach that includes injections, adjuvant medications, physical therapy, at times psychiatry.  There may be a limited role for opioid use intermittently in the treatment of pain, more particularly for acute pain although no one approach can be used as a sole treatment modality.    I emphasized the importance of regular exercise, core strengthening and stretching, diet and weight loss as a cornerstone of long-term pain management.      Juarez Brown MD  Interventional Pain Management  [facility]    Disclaimer:  This note was prepared using voice recognition system and is likely to have sound alike errors that may have been overlooked even after proof reading.  Please call me with any questions

## 2019-12-19 NOTE — TELEPHONE ENCOUNTER
IT doesn't appear that they ran the culture on the urine, maybe because how it was checked in?  I can change her antibotics now since she still has burning pain, but I think that still could be from her back.  We need to get another urine to send for culture.

## 2019-12-20 ENCOUNTER — TELEPHONE (OUTPATIENT)
Dept: SPORTS MEDICINE | Facility: CLINIC | Age: 84
End: 2019-12-20

## 2019-12-20 NOTE — TELEPHONE ENCOUNTER
Called in regards to pt's neurosurgery referral and Brenton Koenig states they do not want to schedule at this time.

## 2019-12-23 ENCOUNTER — TELEPHONE (OUTPATIENT)
Dept: PAIN MEDICINE | Facility: CLINIC | Age: 84
End: 2019-12-23

## 2019-12-23 NOTE — TELEPHONE ENCOUNTER
Spoke with Candy from [employer] and wants  to send back brace orders signed and clinic notes. Informed her we did before, but she did not receive and I informed her I would send again.

## 2019-12-23 NOTE — TELEPHONE ENCOUNTER
----- Message from Maggy Nina sent at 12/23/2019  4:30 PM CST -----  Contact: [facility]  Requesting clinic notes and signed order for back brace. Please fax to [phone]

## 2019-12-23 NOTE — PROGRESS NOTES
She has dementia, and will be hard for her to lay flat or sit still.  How long do you think it would take?  Would a stand MRI be considered? I know we do not have open mri here but we could try external. What do you think?

## 2019-12-27 ENCOUNTER — TELEPHONE (OUTPATIENT)
Dept: INTERNAL MEDICINE | Facility: CLINIC | Age: 84
End: 2019-12-27

## 2019-12-27 ENCOUNTER — PATIENT MESSAGE (OUTPATIENT)
Dept: INTERNAL MEDICINE | Facility: CLINIC | Age: 84
End: 2019-12-27

## 2019-12-27 ENCOUNTER — HOSPITAL ENCOUNTER (INPATIENT)
Facility: HOSPITAL | Age: 84
LOS: 4 days | Discharge: HOSPICE | DRG: 190 | End: 2019-12-31
Attending: EMERGENCY MEDICINE | Admitting: EMERGENCY MEDICINE
Payer: MEDICARE

## 2019-12-27 DIAGNOSIS — D72.829 LEUKOCYTOSIS, UNSPECIFIED TYPE: ICD-10-CM

## 2019-12-27 DIAGNOSIS — R53.1 GENERALIZED WEAKNESS: ICD-10-CM

## 2019-12-27 DIAGNOSIS — J18.9 RML PNEUMONIA: ICD-10-CM

## 2019-12-27 DIAGNOSIS — J18.9 PNEUMONIA OF RIGHT MIDDLE LOBE DUE TO INFECTIOUS ORGANISM: ICD-10-CM

## 2019-12-27 DIAGNOSIS — N39.0 URINARY TRACT INFECTION WITHOUT HEMATURIA, SITE UNSPECIFIED: Primary | ICD-10-CM

## 2019-12-27 DIAGNOSIS — I50.9 CHF (CONGESTIVE HEART FAILURE): ICD-10-CM

## 2019-12-27 DIAGNOSIS — R05.9 COUGH: ICD-10-CM

## 2019-12-27 DIAGNOSIS — K59.00 CONSTIPATION: ICD-10-CM

## 2019-12-27 PROBLEM — K59.09 OTHER CONSTIPATION: Status: ACTIVE | Noted: 2019-12-27

## 2019-12-27 PROBLEM — S22.080A COMPRESSION FRACTURE OF T12 VERTEBRA: Status: ACTIVE | Noted: 2019-12-27

## 2019-12-27 LAB
ALBUMIN SERPL BCP-MCNC: 2.8 G/DL (ref 3.5–5.2)
ALP SERPL-CCNC: 155 U/L (ref 55–135)
ALT SERPL W/O P-5'-P-CCNC: 8 U/L (ref 10–44)
ANION GAP SERPL CALC-SCNC: 10 MMOL/L (ref 8–16)
AST SERPL-CCNC: 20 U/L (ref 10–40)
BACTERIA #/AREA URNS HPF: ABNORMAL /HPF
BASOPHILS # BLD AUTO: 0.07 K/UL (ref 0–0.2)
BASOPHILS NFR BLD: 0.5 % (ref 0–1.9)
BILIRUB SERPL-MCNC: 0.3 MG/DL (ref 0.1–1)
BILIRUB UR QL STRIP: NEGATIVE
BNP SERPL-MCNC: 66 PG/ML (ref 0–99)
BUN SERPL-MCNC: 20 MG/DL (ref 10–30)
CALCIUM SERPL-MCNC: 9.6 MG/DL (ref 8.7–10.5)
CHLORIDE SERPL-SCNC: 98 MMOL/L (ref 95–110)
CLARITY UR: ABNORMAL
CO2 SERPL-SCNC: 32 MMOL/L (ref 23–29)
COLOR UR: YELLOW
CREAT SERPL-MCNC: 0.7 MG/DL (ref 0.5–1.4)
DIFFERENTIAL METHOD: ABNORMAL
EOSINOPHIL # BLD AUTO: 0.9 K/UL (ref 0–0.5)
EOSINOPHIL NFR BLD: 5.9 % (ref 0–8)
ERYTHROCYTE [DISTWIDTH] IN BLOOD BY AUTOMATED COUNT: 13.8 % (ref 11.5–14.5)
EST. GFR  (AFRICAN AMERICAN): >60 ML/MIN/1.73 M^2
EST. GFR  (NON AFRICAN AMERICAN): >60 ML/MIN/1.73 M^2
GLUCOSE SERPL-MCNC: 154 MG/DL (ref 70–110)
GLUCOSE UR QL STRIP: NEGATIVE
HCT VFR BLD AUTO: 46.8 % (ref 37–48.5)
HGB BLD-MCNC: 14.6 G/DL (ref 12–16)
HGB UR QL STRIP: NEGATIVE
IMM GRANULOCYTES # BLD AUTO: 0.1 K/UL (ref 0–0.04)
IMM GRANULOCYTES NFR BLD AUTO: 0.7 % (ref 0–0.5)
INFLUENZA A, MOLECULAR: NEGATIVE
INFLUENZA B, MOLECULAR: NEGATIVE
KETONES UR QL STRIP: NEGATIVE
LACTATE SERPL-SCNC: 2 MMOL/L (ref 0.5–2.2)
LEUKOCYTE ESTERASE UR QL STRIP: NEGATIVE
LYMPHOCYTES # BLD AUTO: 1 K/UL (ref 1–4.8)
LYMPHOCYTES NFR BLD: 6.5 % (ref 18–48)
MAGNESIUM SERPL-MCNC: 1.9 MG/DL (ref 1.6–2.6)
MCH RBC QN AUTO: 32.6 PG (ref 27–31)
MCHC RBC AUTO-ENTMCNC: 31.2 G/DL (ref 32–36)
MCV RBC AUTO: 105 FL (ref 82–98)
MICROSCOPIC COMMENT: ABNORMAL
MONOCYTES # BLD AUTO: 1 K/UL (ref 0.3–1)
MONOCYTES NFR BLD: 6.6 % (ref 4–15)
NEUTROPHILS # BLD AUTO: 11.6 K/UL (ref 1.8–7.7)
NEUTROPHILS NFR BLD: 79.8 % (ref 38–73)
NITRITE UR QL STRIP: POSITIVE
NRBC BLD-RTO: 0 /100 WBC
PH UR STRIP: >8 [PH] (ref 5–8)
PLATELET # BLD AUTO: 593 K/UL (ref 150–350)
PMV BLD AUTO: 8.8 FL (ref 9.2–12.9)
POTASSIUM SERPL-SCNC: 4.1 MMOL/L (ref 3.5–5.1)
PROT SERPL-MCNC: 6.9 G/DL (ref 6–8.4)
PROT UR QL STRIP: NEGATIVE
RBC # BLD AUTO: 4.48 M/UL (ref 4–5.4)
SODIUM SERPL-SCNC: 140 MMOL/L (ref 136–145)
SP GR UR STRIP: 1.01 (ref 1–1.03)
SPECIMEN SOURCE: NORMAL
TROPONIN I SERPL DL<=0.01 NG/ML-MCNC: 0.02 NG/ML (ref 0–0.03)
TSH SERPL DL<=0.005 MIU/L-ACNC: 1.75 UIU/ML (ref 0.4–4)
URN SPEC COLLECT METH UR: ABNORMAL
UROBILINOGEN UR STRIP-ACNC: 1 EU/DL
WBC # BLD AUTO: 14.58 K/UL (ref 3.9–12.7)

## 2019-12-27 PROCEDURE — 96376 TX/PRO/DX INJ SAME DRUG ADON: CPT

## 2019-12-27 PROCEDURE — 84484 ASSAY OF TROPONIN QUANT: CPT

## 2019-12-27 PROCEDURE — 63600175 PHARM REV CODE 636 W HCPCS: Performed by: EMERGENCY MEDICINE

## 2019-12-27 PROCEDURE — 87040 BLOOD CULTURE FOR BACTERIA: CPT

## 2019-12-27 PROCEDURE — 80053 COMPREHEN METABOLIC PANEL: CPT

## 2019-12-27 PROCEDURE — 25500020 PHARM REV CODE 255: Performed by: EMERGENCY MEDICINE

## 2019-12-27 PROCEDURE — 83735 ASSAY OF MAGNESIUM: CPT

## 2019-12-27 PROCEDURE — 21400001 HC TELEMETRY ROOM

## 2019-12-27 PROCEDURE — 85025 COMPLETE CBC W/AUTO DIFF WBC: CPT

## 2019-12-27 PROCEDURE — 93005 ELECTROCARDIOGRAM TRACING: CPT

## 2019-12-27 PROCEDURE — 25000242 PHARM REV CODE 250 ALT 637 W/ HCPCS: Performed by: EMERGENCY MEDICINE

## 2019-12-27 PROCEDURE — 93010 EKG 12-LEAD: ICD-10-PCS | Mod: ,,, | Performed by: INTERNAL MEDICINE

## 2019-12-27 PROCEDURE — 93010 ELECTROCARDIOGRAM REPORT: CPT | Mod: ,,, | Performed by: INTERNAL MEDICINE

## 2019-12-27 PROCEDURE — 84443 ASSAY THYROID STIM HORMONE: CPT

## 2019-12-27 PROCEDURE — 99285 EMERGENCY DEPT VISIT HI MDM: CPT | Mod: 25

## 2019-12-27 PROCEDURE — 83880 ASSAY OF NATRIURETIC PEPTIDE: CPT

## 2019-12-27 PROCEDURE — 99900035 HC TECH TIME PER 15 MIN (STAT)

## 2019-12-27 PROCEDURE — 63600175 PHARM REV CODE 636 W HCPCS: Performed by: NURSE PRACTITIONER

## 2019-12-27 PROCEDURE — 25000003 PHARM REV CODE 250: Performed by: EMERGENCY MEDICINE

## 2019-12-27 PROCEDURE — 25000003 PHARM REV CODE 250: Performed by: NURSE PRACTITIONER

## 2019-12-27 PROCEDURE — 81000 URINALYSIS NONAUTO W/SCOPE: CPT

## 2019-12-27 PROCEDURE — 96367 TX/PROPH/DG ADDL SEQ IV INF: CPT

## 2019-12-27 PROCEDURE — 27000221 HC OXYGEN, UP TO 24 HOURS

## 2019-12-27 PROCEDURE — 94640 AIRWAY INHALATION TREATMENT: CPT

## 2019-12-27 PROCEDURE — 83605 ASSAY OF LACTIC ACID: CPT

## 2019-12-27 PROCEDURE — 96365 THER/PROPH/DIAG IV INF INIT: CPT

## 2019-12-27 PROCEDURE — 87502 INFLUENZA DNA AMP PROBE: CPT

## 2019-12-27 RX ORDER — TRAMADOL HYDROCHLORIDE 50 MG/1
50 TABLET ORAL EVERY 8 HOURS PRN
Status: DISCONTINUED | OUTPATIENT
Start: 2019-12-27 | End: 2019-12-31 | Stop reason: HOSPADM

## 2019-12-27 RX ORDER — POLYETHYLENE GLYCOL 3350 17 G/17G
17 POWDER, FOR SOLUTION ORAL 2 TIMES DAILY
Status: DISCONTINUED | OUTPATIENT
Start: 2019-12-27 | End: 2019-12-31 | Stop reason: HOSPADM

## 2019-12-27 RX ORDER — SYRING-NEEDL,DISP,INSUL,0.3 ML 29 G X1/2"
296 SYRINGE, EMPTY DISPOSABLE MISCELLANEOUS
Status: COMPLETED | OUTPATIENT
Start: 2019-12-27 | End: 2019-12-27

## 2019-12-27 RX ORDER — VANCOMYCIN HCL IN 5 % DEXTROSE 1.5G/250ML
1500 PLASTIC BAG, INJECTION (ML) INTRAVENOUS
Status: COMPLETED | OUTPATIENT
Start: 2019-12-27 | End: 2019-12-27

## 2019-12-27 RX ORDER — IPRATROPIUM BROMIDE AND ALBUTEROL SULFATE 2.5; .5 MG/3ML; MG/3ML
3 SOLUTION RESPIRATORY (INHALATION)
Status: COMPLETED | OUTPATIENT
Start: 2019-12-27 | End: 2019-12-27

## 2019-12-27 RX ORDER — BISACODYL 10 MG
10 SUPPOSITORY, RECTAL RECTAL ONCE
Status: COMPLETED | OUTPATIENT
Start: 2019-12-27 | End: 2019-12-27

## 2019-12-27 RX ORDER — ASPIRIN 81 MG/1
81 TABLET ORAL DAILY
Status: DISCONTINUED | OUTPATIENT
Start: 2019-12-28 | End: 2019-12-31 | Stop reason: HOSPADM

## 2019-12-27 RX ORDER — ESCITALOPRAM OXALATE 10 MG/1
20 TABLET ORAL DAILY
Status: DISCONTINUED | OUTPATIENT
Start: 2019-12-28 | End: 2019-12-31 | Stop reason: HOSPADM

## 2019-12-27 RX ORDER — VANCOMYCIN HCL IN 5 % DEXTROSE 1G/250ML
1000 PLASTIC BAG, INJECTION (ML) INTRAVENOUS
Status: DISCONTINUED | OUTPATIENT
Start: 2019-12-28 | End: 2019-12-29

## 2019-12-27 RX ORDER — PSEUDOEPHEDRINE/ACETAMINOPHEN 30MG-500MG
100 TABLET ORAL
Status: COMPLETED | OUTPATIENT
Start: 2019-12-27 | End: 2019-12-27

## 2019-12-27 RX ADMIN — IOHEXOL 75 ML: 350 INJECTION, SOLUTION INTRAVENOUS at 02:12

## 2019-12-27 RX ADMIN — BISACODYL 10 MG: 10 SUPPOSITORY RECTAL at 02:12

## 2019-12-27 RX ADMIN — PIPERACILLIN AND TAZOBACTAM 4.5 G: 4; .5 INJECTION, POWDER, FOR SOLUTION INTRAVENOUS at 02:12

## 2019-12-27 RX ADMIN — PIPERACILLIN AND TAZOBACTAM 4.5 G: 4; .5 INJECTION, POWDER, FOR SOLUTION INTRAVENOUS at 09:12

## 2019-12-27 RX ADMIN — VANCOMYCIN HYDROCHLORIDE 1500 MG: 100 INJECTION, POWDER, LYOPHILIZED, FOR SOLUTION INTRAVENOUS at 03:12

## 2019-12-27 RX ADMIN — IPRATROPIUM BROMIDE AND ALBUTEROL SULFATE 3 ML: .5; 3 SOLUTION RESPIRATORY (INHALATION) at 11:12

## 2019-12-27 RX ADMIN — SODIUM CHLORIDE 500 ML: 0.9 INJECTION, SOLUTION INTRAVENOUS at 07:12

## 2019-12-27 RX ADMIN — Medication 100 ML: at 07:12

## 2019-12-27 RX ADMIN — MAGNESIUM CITRATE 296 ML: 1.75 LIQUID ORAL at 07:12

## 2019-12-27 NOTE — ASSESSMENT & PLAN NOTE
No Bm in 2 weeks  -KUB showed large amt stool in colon and rectum  -Brown bomb enema  -Miralax BID

## 2019-12-27 NOTE — HPI
Ashley Koenig is a 89 y.o. female patient with a PMHx of CHF, COPD, HTN, HLD, and compression fracture who presented to the ER for cough, onset several days PTA. EMS reports that the pt is on 2L of O2 at home, but had an O2 saturation of 88% at the scene. Associated sxs included generalized weakness, cough, constipation x 2 weeks, confusion x 2 days, bedbound status x 1 month. Patient denies any fever, chills, n/v/d, SOB, CP, numbness, dizziness, headache, and all other sxs at this time. Pt is currently on Doxycycline for her cough. Pt suffered a compression fracture following a fall 1 month ago, and is currently following with Pain Management for non-operative treatment. Son, POA, Brenton Koenig, is SDM [phone] and stated patient is DNR. In ER, CT chest showed RML pneumonia, compression Fx T12, cardiomegaly. KUB showed Large amount stool in colon and rectum. Patient is on 3.5 liters in ER and is unstable for discharge. Son states she has declined in 30 days. When asked, he stated he would like informational visit for Hospice. Social service consult for discharge planning. Observation for Pneumonia and UTI.

## 2019-12-27 NOTE — ASSESSMENT & PLAN NOTE
-Observation for Pneumonia and UTI  -IV Vanco and Zosyn  -CT chest showed RML pneumonia  -pulse ox q 4 hours

## 2019-12-27 NOTE — ED NOTES
Patient identifiers verified and correct for name and .    Increasing weakness and confusion over past couple days. Patient with harsh cough. She is o2 dependent at home per son, who is her caretaker.      LOC: The patient is awake, confused and cooperative with care,  speech clear.  SKIN: Warm and dry, color consistent with ethnicity, normal skin turgor and moist mucus membranes, skin intact, no breakdown or bruising noted.  MUSCULOSKELETAL: Moving all extremities spontaneously but has generalized weakness, has known vertebral fractures, son reports she's been non-ambulatory for the past month   RESPIRATORY: Airway is open and patent with moist cough. O2 in place. Bilateral crackles and wheezes noted posteriorly.  CARDIAC: normal rate and regular rhythm, no periphreal edema noted, capillary refill < 3 seconds.  :   Incontinent pad in place. Recent UTI. Finished antibiotics yesterday  ABDOMEN: Soft and non tender to palpation, no distention noted, normoactive bowel sounds present.  NEUROLOGIC: Eyes open spontaneously, behavior appropriate to situation, follows commands, facial expression symmetrical, bilateral hand grasp equal and even, purposeful motor response noted, normal sensation in all extremities.

## 2019-12-27 NOTE — ED PROVIDER NOTES
SCRIBE #1 NOTE: I, Shahbaz Houston, am scribing for, and in the presence of, Herminio Mcgregor MD. I have scribed the entire note.      History      Chief Complaint   Patient presents with    Weakness     with constipation       Review of patient's allergies indicates:  No Known Allergies     HPI   HPI    12/27/2019, 10:44 AM   History obtained from the patient, her son, and EMS      History of Present Illness: Ashley Koenig is a 89 y.o. female patient with a PMHx of CHF, COPD, HTN, HLD, and compression fracture who presents to the Emergency Department for fatigue, onset several days PTA. EMS reports that the pt is on 2L of O2 at home, but had an O2 saturation of 88% at the scene. Symptoms are constant and moderate in severity. No mitigating or exacerbating factors reported. Associated sxs include generalized weakness, cough, constipation x 2 weeks, and confusion x 2 days. Patient denies any fever, chills, n/v/d, SOB, CP, numbness, dizziness, headache, and all other sxs at this time. Pt is currently on Doxycycline for her cough and keflex for UTI.. Pt suffered a compression fracture following a fall 1 month ago, and is currently following with Pain Management for non-operative treatment. No further complaints or concerns at this time.     Arrival mode: EMS    PCP: Maggy Ngo MD       Past Medical History:  Past Medical History:   Diagnosis Date    Acute congestive heart failure 3/6/2019    Age-related macular degeneration, wet, both eyes     Anginal equivalent     Anxiety     Arthritis     BPPV (benign paroxysmal positional vertigo)     Brain tumor     Clotting disorder     lung    COPD (chronic obstructive pulmonary disease)     Depression     Diastolic dysfunction     Hearing loss     Heart disease     Hyperlipidemia     Hypertension     Hypoxemia     Lung nodule     Meningioma     Multiple thyroid nodules     Pulmonary embolism     Pulmonary hypertension     Sepsis due to pneumonia      Sleep apnea in adult     Stroke     per eye exam, never been treated for    Urinary incontinence     Vitamin D deficiency        Past Surgical History:  Past Surgical History:   Procedure Laterality Date    CATARACT EXTRACTION Bilateral     TOTAL HIP ARTHROPLASTY Right 08/20/2016    aafter fx         Family History:  Family History   Problem Relation Age of Onset    Alzheimer's disease Father     Alzheimer's disease Daughter     No Known Problems Son        Social History:  Social History     Tobacco Use    Smoking status: Never Smoker    Smokeless tobacco: Never Used   Substance and Sexual Activity    Alcohol use: No    Drug use: No    Sexual activity: Not Currently       ROS   Review of Systems   Constitutional: Positive for fatigue. Negative for chills, diaphoresis and fever.   HENT: Negative for sore throat.    Respiratory: Positive for cough. Negative for shortness of breath.    Cardiovascular: Negative for chest pain.   Gastrointestinal: Positive for constipation (x 2 weeks). Negative for diarrhea, nausea and vomiting.   Genitourinary: Negative for dysuria.   Musculoskeletal: Negative for back pain.   Skin: Negative for rash.   Neurological: Positive for weakness (generalized). Negative for dizziness, seizures, light-headedness, numbness and headaches.   Hematological: Does not bruise/bleed easily.   Psychiatric/Behavioral: Positive for confusion.   All other systems reviewed and are negative.    Physical Exam      Initial Vitals [12/27/19 1032]   BP Pulse Resp Temp SpO2   107/78 90 (!) 22 98.1 °F (36.7 °C) (!) 94 %      MAP       --          Physical Exam  Nursing Notes and Vital Signs Reviewed.  Constitutional: Patient is in no acute distress. Well-developed and well-nourished.  Head: Atraumatic. Normocephalic.  Eyes: PERRL. EOM intact. Conjunctivae are not pale. No scleral icterus.  ENT: Mucous membranes are moist. Oropharynx is clear and symmetric. no evidence of mastoiditis   Neck: Supple.  "Full ROM. No lymphadenopathy.  Cardiovascular: Regular rate. Regular rhythm. No murmurs, rubs, or gallops. Distal pulses are 2+ and symmetric.  Pulmonary/Chest: Bilateral rhonchi and wheezing.  Abdominal: Soft and non-distended.  There is no tenderness.  No rebound, guarding, or rigidity.   Musculoskeletal: Moves all extremities. No obvious deformities. No edema.  Skin: Warm and dry.  Neurological:  Alert, awake, and appropriate.  Normal speech.  No acute focal neurological deficits are appreciated.  Psychiatric: Normal affect. Good eye contact. Appropriate in content.    ED Course    Procedures  ED Vital Signs:  Vitals:    12/27/19 1032 12/27/19 1055 12/27/19 1102 12/27/19 1146   BP: 107/78   110/78   Pulse: 90 90 95 93   Resp: (!) 22  20 (!) 22   Temp: 98.1 °F (36.7 °C)      TempSrc: (S) Oral      SpO2: (!) 94%  95% (!) 94%   Weight:       Height: 5' 6" (1.676 m)       12/27/19 1237 12/27/19 1238   BP:  (!) 119/55   Pulse:  81   Resp:  (!) 22   Temp:     TempSrc:     SpO2:  (!) 94%   Weight: 63.8 kg (140 lb 10.5 oz)    Height:         Abnormal Lab Results:  Labs Reviewed   CBC W/ AUTO DIFFERENTIAL - Abnormal; Notable for the following components:       Result Value    WBC 14.58 (*)     Mean Corpuscular Volume 105 (*)     Mean Corpuscular Hemoglobin 32.6 (*)     Mean Corpuscular Hemoglobin Conc 31.2 (*)     Platelets 593 (*)     MPV 8.8 (*)     Immature Granulocytes 0.7 (*)     Gran # (ANC) 11.6 (*)     Immature Grans (Abs) 0.10 (*)     Eos # 0.9 (*)     Gran% 79.8 (*)     Lymph% 6.5 (*)     All other components within normal limits   COMPREHENSIVE METABOLIC PANEL - Abnormal; Notable for the following components:    CO2 32 (*)     Glucose 154 (*)     Albumin 2.8 (*)     Alkaline Phosphatase 155 (*)     ALT 8 (*)     All other components within normal limits   URINALYSIS, REFLEX TO URINE CULTURE - Abnormal; Notable for the following components:    Appearance, UA Cloudy (*)     pH, UA >8.0 (*)     Nitrite, UA " Positive (*)     All other components within normal limits    Narrative:     Preferred Collection Type->Urine, Catheterized   URINALYSIS MICROSCOPIC - Abnormal; Notable for the following components:    Bacteria Moderate (*)     All other components within normal limits    Narrative:     Preferred Collection Type->Urine, Catheterized   INFLUENZA A & B BY MOLECULAR   CULTURE, BLOOD   CULTURE, BLOOD   B-TYPE NATRIURETIC PEPTIDE   TROPONIN I   TSH   LACTIC ACID, PLASMA        All Lab Results:  Results for orders placed or performed during the hospital encounter of 12/27/19   Influenza A & B by Molecular   Result Value Ref Range    Influenza A, Molecular Negative Negative    Influenza B, Molecular Negative Negative    Flu A & B Source NP    CBC auto differential   Result Value Ref Range    WBC 14.58 (H) 3.90 - 12.70 K/uL    RBC 4.48 4.00 - 5.40 M/uL    Hemoglobin 14.6 12.0 - 16.0 g/dL    Hematocrit 46.8 37.0 - 48.5 %    Mean Corpuscular Volume 105 (H) 82 - 98 fL    Mean Corpuscular Hemoglobin 32.6 (H) 27.0 - 31.0 pg    Mean Corpuscular Hemoglobin Conc 31.2 (L) 32.0 - 36.0 g/dL    RDW 13.8 11.5 - 14.5 %    Platelets 593 (H) 150 - 350 K/uL    MPV 8.8 (L) 9.2 - 12.9 fL    Immature Granulocytes 0.7 (H) 0.0 - 0.5 %    Gran # (ANC) 11.6 (H) 1.8 - 7.7 K/uL    Immature Grans (Abs) 0.10 (H) 0.00 - 0.04 K/uL    Lymph # 1.0 1.0 - 4.8 K/uL    Mono # 1.0 0.3 - 1.0 K/uL    Eos # 0.9 (H) 0.0 - 0.5 K/uL    Baso # 0.07 0.00 - 0.20 K/uL    nRBC 0 0 /100 WBC    Gran% 79.8 (H) 38.0 - 73.0 %    Lymph% 6.5 (L) 18.0 - 48.0 %    Mono% 6.6 4.0 - 15.0 %    Eosinophil% 5.9 0.0 - 8.0 %    Basophil% 0.5 0.0 - 1.9 %    Differential Method Automated    Comprehensive metabolic panel   Result Value Ref Range    Sodium 140 136 - 145 mmol/L    Potassium 4.1 3.5 - 5.1 mmol/L    Chloride 98 95 - 110 mmol/L    CO2 32 (H) 23 - 29 mmol/L    Glucose 154 (H) 70 - 110 mg/dL    BUN, Bld 20 10 - 30 mg/dL    Creatinine 0.7 0.5 - 1.4 mg/dL    Calcium 9.6 8.7 - 10.5  mg/dL    Total Protein 6.9 6.0 - 8.4 g/dL    Albumin 2.8 (L) 3.5 - 5.2 g/dL    Total Bilirubin 0.3 0.1 - 1.0 mg/dL    Alkaline Phosphatase 155 (H) 55 - 135 U/L    AST 20 10 - 40 U/L    ALT 8 (L) 10 - 44 U/L    Anion Gap 10 8 - 16 mmol/L    eGFR if African American >60 >60 mL/min/1.73 m^2    eGFR if non African American >60 >60 mL/min/1.73 m^2   Brain natriuretic peptide   Result Value Ref Range    BNP 66 0 - 99 pg/mL   Troponin I   Result Value Ref Range    Troponin I 0.023 0.000 - 0.026 ng/mL   Urinalysis, Reflex to Urine Culture Urine, Catheterized   Result Value Ref Range    Specimen UA Urine, Catheterized     Color, UA Yellow Yellow, Straw, Shu    Appearance, UA Cloudy (A) Clear    pH, UA >8.0 (A) 5.0 - 8.0    Specific Gravity, UA 1.015 1.005 - 1.030    Protein, UA Negative Negative    Glucose, UA Negative Negative    Ketones, UA Negative Negative    Bilirubin (UA) Negative Negative    Occult Blood UA Negative Negative    Nitrite, UA Positive (A) Negative    Urobilinogen, UA 1.0 <2.0 EU/dL    Leukocytes, UA Negative Negative   TSH   Result Value Ref Range    TSH 1.747 0.400 - 4.000 uIU/mL   Lactic acid, plasma   Result Value Ref Range    Lactate (Lactic Acid) 2.0 0.5 - 2.2 mmol/L   Urinalysis Microscopic   Result Value Ref Range    Bacteria Moderate (A) None-Occ /hpf    Microscopic Comment SEE COMMENT      Imaging Results:  Imaging Results          X-Ray Chest AP Portable (Final result)  Result time 12/27/19 12:27:17    Final result by Claudio Husain MD (12/27/19 12:27:17)                 Impression:      No acute findings.      Electronically signed by: Claudio Husain MD  Date:    12/27/2019  Time:    12:27             Narrative:    EXAMINATION:  XR CHEST AP PORTABLE    CLINICAL HISTORY:  cough;    TECHNIQUE:  AP view of the chest was performed.    COMPARISON:  12/16/2019 and 07/10/2019    FINDINGS:  Bandlike scarring in the lower lobes.  No acute infiltrates detected.    Cardiomegaly.  No pneumothorax.  No acute  osseous findings demonstrated.                               X-Ray Abdomen AP 1 View (KUB) (Final result)  Result time 12/27/19 12:25:15    Final result by Claudio Husain MD (12/27/19 12:25:15)                 Impression:      Constipation.      Electronically signed by: Claudio Husain MD  Date:    12/27/2019  Time:    12:25             Narrative:    EXAMINATION:  XR ABDOMEN AP 1 VIEW    CLINICAL HISTORY:  - Constipation, unspecified.    COMPARISON:  12/16/2019    FINDINGS:  Large amount of stool seen colon and rectum.  No dilated loops of small appreciated.  Moderate to severe remote compression fractures T10 through L1.                               CT Head Without Contrast (Final result)  Result time 12/27/19 11:51:09    Final result by Herminio Miner MD (12/27/19 11:51:09)                 Impression:      Question fluid level right maxillary sinus which could reflect acute sinusitis.    Fluid level within the left mastoid air cells could reflect mastoiditis.    Calcified lesion along the tentorium measuring approximately 2.5 x 1.7 cm thought to reflect a calcified meningioma.    Chronic microvascular ischemic changes.      Electronically signed by: Herminio Miner MD  Date:    12/27/2019  Time:    11:51             Narrative:    EXAMINATION:  CT HEAD WITHOUT CONTRAST    CLINICAL HISTORY:  Confusion/delirium, altered LOC, unexplained;    TECHNIQUE:  Low dose axial CT images obtained throughout the head without intravenous contrast. Sagittal and coronal reconstructions were performed.  All CT scans at this facility use dose modulation, iterative reconstruction and/or weight based dosing when appropriate to reduce radiation dose to as low as reasonably achievable.    COMPARISON:  None.    FINDINGS:  Intracranial compartment: Calcified lesion along the tentorium measuring approximately 2.5 x 1.7 cm thought to reflect a calcified meningioma.    The brain parenchyma demonstrates areas of decreased attenuation with severe  periventricular white matter consistent with chronic microvascular ischemic changes..  No parenchymal mass, hemorrhage, edema or major vascular distribution infarct.  Vascular calcifications are noted.    Moderate prominence of the sulci and ventricles are consistent with age-related involutional changes.    No extra-axial blood or fluid collections.    Skull/extracranial contents (limited evaluation): No fracture. Question fluid level right maxillary sinus which could reflect acute sinusitis.  Diffuse mucosal thickening throughout the paranasal sinuses.  Trace bilateral mastoid effusions.  Fluid level within the left mastoid air cells could reflect mastoiditis.                               The EKG was ordered, reviewed, and independently interpreted by the ED provider.  Interpretation time: 11:08  Rate: 87 BPM  Rhythm: Normal sinus rhythm with sinus arrhythmia.  Interpretation: Incomplete RBBB. Left axis deviation. No significant ST depression or ST elevation. No STEMI.           The Emergency Provider reviewed the vital signs and test results, which are outlined above.    ED Discussion     2:42 PM: Discussed case with Kiya Weaver NP ([facility]). Dr. Little agrees with current care and management of pt and accepts admission. [facility] also recommends getting a sputum culture and a chest CT.  Admitting Service: [facility]  Admitting Physician: Dr. Little  Admit to: Obs     2:43 PM: Re-evaluated pt. I have discussed test results, shared treatment plan, and the need for admission with patient and family at bedside. Pt and family express understanding at this time and agree with all information. All questions answered. Pt and family have no further questions or concerns at this time. Pt is ready for admit.    ED Medication(s):  Medications   cefTRIAXone (ROCEPHIN) 1 g in dextrose 5 % 50 mL IVPB (has no administration in time range)   albuterol-ipratropium 2.5 mg-0.5 mg/3 mL nebulizer solution 3  mL (3 mLs Nebulization Given 12/27/19 4202)          New Prescriptions    No medications on file         Medical Decision Making    Medical Decision Making:   Clinical Tests:   Lab Tests: Ordered and Reviewed  Radiological Study: Ordered and Reviewed  Medical Tests: Ordered and Reviewed  UTI.  Exam and history suspicious for pneumonia.  Pneumonia not identified on chest x-ray.  Patient was on doxycycline and Keflex for pneumonia and urinary tract infection respectively.  Patient will need to be admitted due to overall decline.  Discussed case with Hospital Medicine.  Ordering a CT chest for further evaluation of potential pneumonia.  CT chest pending at the time admission.  IV antibiotics given in the emergency department           Scribe Attestation:   Scribe #1: I performed the above scribed service and the documentation accurately describes the services I performed. I attest to the accuracy of the note.    Attending:   Physician Attestation Statement for Scribe #1: I, Herminio Mcgregor MD, personally performed the services described in this documentation, as scribed by Shahbaz Houston, in my presence, and it is both accurate and complete.          Clinical Impression     1.  Urinary tract infection without hematuria, site unspecified  2. Pneumonia  3. Generalized weakness  4. Chronic back pain  5. Constipation    Disposition:   Disposition: Placed in Observation  Condition: Fair         Herminio Mcgregor MD  12/29/19 0113

## 2019-12-27 NOTE — SUBJECTIVE & OBJECTIVE
Past Medical History:   Diagnosis Date    Acute congestive heart failure 3/6/2019    Age-related macular degeneration, wet, both eyes     Anginal equivalent     Anxiety     Arthritis     BPPV (benign paroxysmal positional vertigo)     Brain tumor     Clotting disorder     lung    COPD (chronic obstructive pulmonary disease)     Depression     Diastolic dysfunction     Hearing loss     Heart disease     Hyperlipidemia     Hypertension     Hypoxemia     Lung nodule     Meningioma     Multiple thyroid nodules     Pulmonary embolism     Pulmonary hypertension     Sepsis due to pneumonia     Sleep apnea in adult     Stroke     per eye exam, never been treated for    Urinary incontinence     Vitamin D deficiency        Past Surgical History:   Procedure Laterality Date    CATARACT EXTRACTION Bilateral     TOTAL HIP ARTHROPLASTY Right 08/20/2016    [facility]       Review of patient's allergies indicates:  No Known Allergies    No current facility-administered medications on file prior to encounter.      Current Outpatient Medications on File Prior to Encounter   Medication Sig    aspirin (ECOTRIN) 81 MG EC tablet Take 1 tablet (81 mg total) by mouth once daily.    cephALEXin (KEFLEX) 500 MG capsule Take 1 capsule (500 mg total) by mouth every 12 (twelve) hours.    escitalopram oxalate (LEXAPRO) 20 MG tablet TAKE 1 TABLET ONCE DAILY    MULTIVITAMIN,THER AND MINERALS (VITAMINS AND MINERALS ORAL) Take by mouth once daily.    traMADol (ULTRAM) 50 mg tablet Take 1/2 to 1 tab PO q6 PRN pain.    alprazolam (XANAX) 0.25 MG tablet Take 0.25 mg by mouth 2 (two) times daily as needed for Anxiety.    back brace Misc 1 Device by Misc.(Non-Drug; Combo Route) route daily as needed.     Family History     Problem Relation (Age of Onset)    Alzheimer's disease Father, Daughter    No Known Problems Son        Tobacco Use    Smoking status: Never Smoker    Smokeless tobacco: Never Used   Substance and  Sexual Activity    Alcohol use: No    Drug use: No    Sexual activity: Not Currently     Review of Systems   Constitutional: Positive for appetite change (loss of appetite), fatigue and unexpected weight change (weight loss). Negative for chills and fever. Activity change: bedbound since November 2019.   HENT: Negative.  Negative for congestion, ear discharge, facial swelling, sore throat and trouble swallowing.    Eyes: Negative.  Negative for photophobia, pain, discharge, redness and visual disturbance.   Respiratory: Positive for cough and shortness of breath (chronic 2 liters O2 at home). Negative for chest tightness, wheezing and stridor.    Cardiovascular: Negative.  Negative for chest pain, palpitations and leg swelling.   Gastrointestinal: Negative for abdominal distention, abdominal pain, anal bleeding, blood in stool, constipation, diarrhea, nausea, rectal pain and vomiting.   Endocrine: Negative.  Negative for cold intolerance, heat intolerance, polydipsia, polyphagia and polyuria.   Genitourinary: Negative.  Negative for difficulty urinating, dysuria, flank pain, frequency, hematuria, pelvic pain, urgency, vaginal bleeding and vaginal discharge.   Musculoskeletal: Negative.  Negative for arthralgias, back pain, gait problem, joint swelling, myalgias and neck pain.   Skin: Negative for color change, pallor, rash and wound.   Allergic/Immunologic: Negative.  Negative for food allergies and immunocompromised state.   Neurological: Positive for weakness. Negative for dizziness, tremors, seizures, syncope, facial asymmetry, speech difficulty, light-headedness, numbness and headaches.   Hematological: Negative.  Negative for adenopathy. Does not bruise/bleed easily.   Psychiatric/Behavioral: Positive for dysphoric mood. Negative for agitation, confusion, hallucinations, sleep disturbance and suicidal ideas. The patient is not nervous/anxious.    All other systems reviewed and are negative.    Objective:      Vital Signs (Most Recent):  Temp: 98.1 °F (36.7 °C) (12/27/19 1032)  Pulse: 81 (12/27/19 1532)  Resp: (!) 22 (12/27/19 1532)  BP: (!) 144/67 (12/27/19 1532)  SpO2: (!) 94 % (12/27/19 1532) Vital Signs (24h Range):  Temp:  [98.1 °F (36.7 °C)] 98.1 °F (36.7 °C)  Pulse:  [73-95] 81  Resp:  [20-47] 22  SpO2:  [91 %-95 %] 94 %  BP: (107-144)/(55-78) 144/67     Weight: 63.8 kg (140 lb 10.5 oz)  Body mass index is 22.7 kg/m².    Physical Exam   Constitutional: She is oriented to person, place, and time. She appears well-developed. No distress.   Catchectic, ill appearing   HENT:   Head: Normocephalic and atraumatic.   Nose: Nose normal.   Hearing Loss   Eyes: Pupils are equal, round, and reactive to light. Conjunctivae and EOM are normal. Right eye exhibits no discharge. Left eye exhibits no discharge.   Neck: Normal range of motion. Neck supple. No JVD present. No tracheal deviation present. No thyromegaly present.   Cardiovascular: Normal rate, regular rhythm and normal heart sounds. Exam reveals no gallop and no friction rub.   No murmur heard.  Pulmonary/Chest: Effort normal and breath sounds normal. No stridor. No respiratory distress. She has no wheezes. She has no rales. She exhibits no tenderness.   Abdominal: Soft. She exhibits no distension and no mass. There is no tenderness. There is no guarding.   Diminished bowel sounds. Abdomen soft, non-tender   Musculoskeletal: Normal range of motion. She exhibits edema. She exhibits no tenderness or deformity.   Lymphadenopathy:     She has no cervical adenopathy.   Neurological: She is alert and oriented to person, place, and time. She has normal reflexes. No cranial nerve deficit. Coordination normal.   Skin: Skin is warm and dry. No rash noted. She is not diaphoretic. No erythema. No pallor.   Psychiatric: She has a normal mood and affect. Her behavior is normal. Judgment and thought content normal.   Nursing note and vitals reviewed.        CRANIAL NERVES     CN  III, IV, VI   Pupils are equal, round, and reactive to light.  Extraocular motions are normal.        Significant Labs: All pertinent labs within the past 24 hours have been reviewed.  Results for orders placed or performed during the hospital encounter of 12/27/19   Influenza A & B by Molecular   Result Value Ref Range    Influenza A, Molecular Negative Negative    Influenza B, Molecular Negative Negative    Flu A & B Source NP    CBC auto differential   Result Value Ref Range    WBC 14.58 (H) 3.90 - 12.70 K/uL    RBC 4.48 4.00 - 5.40 M/uL    Hemoglobin 14.6 12.0 - 16.0 g/dL    Hematocrit 46.8 37.0 - 48.5 %    Mean Corpuscular Volume 105 (H) 82 - 98 fL    Mean Corpuscular Hemoglobin 32.6 (H) 27.0 - 31.0 pg    Mean Corpuscular Hemoglobin Conc 31.2 (L) 32.0 - 36.0 g/dL    RDW 13.8 11.5 - 14.5 %    Platelets 593 (H) 150 - 350 K/uL    MPV 8.8 (L) 9.2 - 12.9 fL    Immature Granulocytes 0.7 (H) 0.0 - 0.5 %    Gran # (ANC) 11.6 (H) 1.8 - 7.7 K/uL    Immature Grans (Abs) 0.10 (H) 0.00 - 0.04 K/uL    Lymph # 1.0 1.0 - 4.8 K/uL    Mono # 1.0 0.3 - 1.0 K/uL    Eos # 0.9 (H) 0.0 - 0.5 K/uL    Baso # 0.07 0.00 - 0.20 K/uL    nRBC 0 0 /100 WBC    Gran% 79.8 (H) 38.0 - 73.0 %    Lymph% 6.5 (L) 18.0 - 48.0 %    Mono% 6.6 4.0 - 15.0 %    Eosinophil% 5.9 0.0 - 8.0 %    Basophil% 0.5 0.0 - 1.9 %    Differential Method Automated    Comprehensive metabolic panel   Result Value Ref Range    Sodium 140 136 - 145 mmol/L    Potassium 4.1 3.5 - 5.1 mmol/L    Chloride 98 95 - 110 mmol/L    CO2 32 (H) 23 - 29 mmol/L    Glucose 154 (H) 70 - 110 mg/dL    BUN, Bld 20 10 - 30 mg/dL    Creatinine 0.7 0.5 - 1.4 mg/dL    Calcium 9.6 8.7 - 10.5 mg/dL    Total Protein 6.9 6.0 - 8.4 g/dL    Albumin 2.8 (L) 3.5 - 5.2 g/dL    Total Bilirubin 0.3 0.1 - 1.0 mg/dL    Alkaline Phosphatase 155 (H) 55 - 135 U/L    AST 20 10 - 40 U/L    ALT 8 (L) 10 - 44 U/L    Anion Gap 10 8 - 16 mmol/L    eGFR if African American >60 >60 mL/min/1.73 m^2    eGFR if non African  American >60 >60 mL/min/1.73 m^2   Brain natriuretic peptide   Result Value Ref Range    BNP 66 0 - 99 pg/mL   Troponin I   Result Value Ref Range    Troponin I 0.023 0.000 - 0.026 ng/mL   Urinalysis, Reflex to Urine Culture Urine, Catheterized   Result Value Ref Range    Specimen UA Urine, Catheterized     Color, UA Yellow Yellow, Straw, Shu    Appearance, UA Cloudy (A) Clear    pH, UA >8.0 (A) 5.0 - 8.0    Specific Gravity, UA 1.015 1.005 - 1.030    Protein, UA Negative Negative    Glucose, UA Negative Negative    Ketones, UA Negative Negative    Bilirubin (UA) Negative Negative    Occult Blood UA Negative Negative    Nitrite, UA Positive (A) Negative    Urobilinogen, UA 1.0 <2.0 EU/dL    Leukocytes, UA Negative Negative   TSH   Result Value Ref Range    TSH 1.747 0.400 - 4.000 uIU/mL   Lactic acid, plasma   Result Value Ref Range    Lactate (Lactic Acid) 2.0 0.5 - 2.2 mmol/L   Urinalysis Microscopic   Result Value Ref Range    Bacteria Moderate (A) None-Occ /hpf    Microscopic Comment SEE COMMENT      Significant Imaging: I have reviewed all pertinent imaging results/findings within the past 24 hours.  Imaging Results          CT Chest With Contrast (Final result)  Result time 12/27/19 15:18:50    Final result by Claudio Husain MD (12/27/19 15:18:50)                 Impression:      Subsegmental right middle lobe peripheral alveolar infiltrate, likely a focal pneumonia.    Suspect a pathologic compression fracture through a vertebral body metastasis at T12.  Recommend dedicated MRI of the thoracolumbar spine with and without intravenous contrast for further evaluation.    Stable 7 mm right upper lobe pulmonary nodule.  Recommend 1 year follow-up study.    Four-chamber cardiomegaly.    All CT scans at this facility are performed  using dose modulation techniques as appropriate to performed exam including the following:  automated exposure control; adjustment of mA and/or kV according to the patients size (this  includes techniques or standardized protocols for targeted exams where dose is matched to indication/reason for exam: i.e. extremities or head);  iterative reconstruction technique.      Electronically signed by: Claudio Husain MD  Date:    12/27/2019  Time:    15:18             Narrative:    EXAMINATION:  CT CHEST WITH CONTRAST    CLINICAL HISTORY:  Cough, persistent;    TECHNIQUE:  Axial CT images performed through the chest  after  the administration of 75 cc of intravenous contrast.    COMPARISON:  12/27/2019, 07/10/2019, 03/06/2019    FINDINGS:  The evaluation is limited by respiratory motion artifact.    Four-chamber cardiomegaly.  Aortic atherosclerosis.  Trace right pleural effusion.  Subsegmental atelectasis in the right lung base, left lower lobe, and lingula along the major fissure with associated volume loss.  Subsegmental focal alveolar opacity in the periphery of the lateral segment of the right middle lobe.  No thoracic adenopathy.    7 mm right upper lobe pulmonary nodule on axial image 175 of series 3.    No pneumothorax.    Severe, chronic osteoporotic compression fracture of the T10 vertebral body.  Moderate compression fractures T12 and partially imaged compression fracture of L1.  Anterior cortical destruction and lucency at the site of the T12 compression fracture highly suspicious for metastatic disease.                               X-Ray Chest AP Portable (Final result)  Result time 12/27/19 12:27:17    Final result by Claudio Husain MD (12/27/19 12:27:17)                 Impression:      No acute findings.      Electronically signed by: Claudio Husain MD  Date:    12/27/2019  Time:    12:27             Narrative:    EXAMINATION:  XR CHEST AP PORTABLE    CLINICAL HISTORY:  cough;    TECHNIQUE:  AP view of the chest was performed.    COMPARISON:  12/16/2019 and 07/10/2019    FINDINGS:  Bandlike scarring in the lower lobes.  No acute infiltrates detected.    Cardiomegaly.  No pneumothorax.  No acute  osseous findings demonstrated.                               X-Ray Abdomen AP 1 View (KUB) (Final result)  Result time 12/27/19 12:25:15    Final result by Claudio Husain MD (12/27/19 12:25:15)                 Impression:      Constipation.      Electronically signed by: Claudio Husain MD  Date:    12/27/2019  Time:    12:25             Narrative:    EXAMINATION:  XR ABDOMEN AP 1 VIEW    CLINICAL HISTORY:  - Constipation, unspecified.    COMPARISON:  12/16/2019    FINDINGS:  Large amount of stool seen colon and rectum.  No dilated loops of small appreciated.  Moderate to severe remote compression fractures T10 through L1.                               CT Head Without Contrast (Final result)  Result time 12/27/19 11:51:09    Final result by Herminio Miner MD (12/27/19 11:51:09)                 Impression:      Question fluid level right maxillary sinus which could reflect acute sinusitis.    Fluid level within the left mastoid air cells could reflect mastoiditis.    Calcified lesion along the tentorium measuring approximately 2.5 x 1.7 cm thought to reflect a calcified meningioma.    Chronic microvascular ischemic changes.      Electronically signed by: Herminio Miner MD  Date:    12/27/2019  Time:    11:51             Narrative:    EXAMINATION:  CT HEAD WITHOUT CONTRAST    CLINICAL HISTORY:  Confusion/delirium, altered LOC, unexplained;    TECHNIQUE:  Low dose axial CT images obtained throughout the head without intravenous contrast. Sagittal and coronal reconstructions were performed.  All CT scans at this facility use dose modulation, iterative reconstruction and/or weight based dosing when appropriate to reduce radiation dose to as low as reasonably achievable.    COMPARISON:  None.    FINDINGS:  Intracranial compartment: Calcified lesion along the tentorium measuring approximately 2.5 x 1.7 cm thought to reflect a calcified meningioma.    The brain parenchyma demonstrates areas of decreased attenuation with severe  periventricular white matter consistent with chronic microvascular ischemic changes..  No parenchymal mass, hemorrhage, edema or major vascular distribution infarct.  Vascular calcifications are noted.    Moderate prominence of the sulci and ventricles are consistent with age-related involutional changes.    No extra-axial blood or fluid collections.    Skull/extracranial contents (limited evaluation): No fracture. Question fluid level right maxillary sinus which could reflect acute sinusitis.  Diffuse mucosal thickening throughout the paranasal sinuses.  Trace bilateral mastoid effusions.  Fluid level within the left mastoid air cells could reflect mastoiditis.

## 2019-12-27 NOTE — ED NOTES
Introduced self to patient and family. Oriented to environment. Explained process and plan of care.

## 2019-12-27 NOTE — H&P
[facility] Medicine  History & Physical    Patient Name: Ashley Koenig  MRN: [id number]  Admission Date: 12/27/2019  Attending Physician: Sophie Little MD   Primary Care Provider: Maggy Ngo MD      Patient information was obtained from patient, spouse/SO, past medical records and ER records.     Subjective:     Principal Problem:Pneumonia of right middle lobe due to infectious organism    Chief Complaint:   Chief Complaint   Patient presents with    Weakness     with constipation        HPI: Ashley Koenig is a 89 y.o. female patient with a PMHx of CHF, COPD, HTN, HLD, and compression fracture who presented to the ER for  cough, onset several days PTA. EMS reports that the pt is on 2L of O2 at home, but had an O2 saturation of 88% at the scene. Associated sxs included generalized weakness, cough, constipation x 2 weeks, confusion x 2 days, bedbound status x 1 month. Patient denies any fever, chills, n/v/d, SOB, CP, numbness, dizziness, headache, and all other sxs at this time. Pt is currently on Doxycycline for her cough. Pt suffered a compression fracture following a fall 1 month ago, and is currently following with Pain Management for non-operative treatment.  Son, MONSEA, Brenton Koenig, is SDM [phone] and stated patient is DNR. In ER, CT chest showed RML pneumonia, compression Fx T12, cardiomegaly. KUB showed Large amount stool in colon and rectum. Patient is on 3.5 liters in ER and is unstable for discharge. Son states she has declined in 30 days. When asked, he stated he would like informational visit for Hospice. Social service consult for discharge planning. Observation for Pneumonia and UTI.     Past Medical History:   Diagnosis Date    Acute congestive heart failure 3/6/2019    Age-related macular degeneration, wet, both eyes     Anginal equivalent     Anxiety     Arthritis     BPPV (benign paroxysmal positional vertigo)     Brain tumor     Clotting  disorder     lung    COPD (chronic obstructive pulmonary disease)     Depression     Diastolic dysfunction     Hearing loss     Heart disease     Hyperlipidemia     Hypertension     Hypoxemia     Lung nodule     Meningioma     Multiple thyroid nodules     Pulmonary embolism     Pulmonary hypertension     Sepsis due to pneumonia     Sleep apnea in adult     Stroke     per eye exam, never been treated for    Urinary incontinence     Vitamin D deficiency        Past Surgical History:   Procedure Laterality Date    CATARACT EXTRACTION Bilateral     TOTAL HIP ARTHROPLASTY Right 08/20/2016    [facility]       Review of patient's allergies indicates:  No Known Allergies    No current facility-administered medications on file prior to encounter.      Current Outpatient Medications on File Prior to Encounter   Medication Sig    aspirin (ECOTRIN) 81 MG EC tablet Take 1 tablet (81 mg total) by mouth once daily.    cephALEXin (KEFLEX) 500 MG capsule Take 1 capsule (500 mg total) by mouth every 12 (twelve) hours.    escitalopram oxalate (LEXAPRO) 20 MG tablet TAKE 1 TABLET ONCE DAILY    MULTIVITAMIN,THER AND MINERALS (VITAMINS AND MINERALS ORAL) Take by mouth once daily.    traMADol (ULTRAM) 50 mg tablet Take 1/2 to 1 tab PO q6 PRN pain.    alprazolam (XANAX) 0.25 MG tablet Take 0.25 mg by mouth 2 (two) times daily as needed for Anxiety.    back brace Misc 1 Device by Misc.(Non-Drug; Combo Route) route daily as needed.     Family History     Problem Relation (Age of Onset)    Alzheimer's disease Father, Daughter    No Known Problems Son        Tobacco Use    Smoking status: Never Smoker    Smokeless tobacco: Never Used   Substance and Sexual Activity    Alcohol use: No    Drug use: No    Sexual activity: Not Currently     Review of Systems   Constitutional: Positive for appetite change (loss of appetite), fatigue and unexpected weight change (weight loss). Negative for chills and fever. Activity  change: bedbound since November 2019.   HENT: Negative.  Negative for congestion, ear discharge, facial swelling, sore throat and trouble swallowing.    Eyes: Negative.  Negative for photophobia, pain, discharge, redness and visual disturbance.   Respiratory: Positive for cough and shortness of breath (chronic 2 liters O2 at home). Negative for chest tightness, wheezing and stridor.    Cardiovascular: Negative.  Negative for chest pain, palpitations and leg swelling.   Gastrointestinal: Negative for abdominal distention, abdominal pain, anal bleeding, blood in stool, constipation, diarrhea, nausea, rectal pain and vomiting.   Endocrine: Negative.  Negative for cold intolerance, heat intolerance, polydipsia, polyphagia and polyuria.   Genitourinary: Negative.  Negative for difficulty urinating, dysuria, flank pain, frequency, hematuria, pelvic pain, urgency, vaginal bleeding and vaginal discharge.   Musculoskeletal: Negative.  Negative for arthralgias, back pain, gait problem, joint swelling, myalgias and neck pain.   Skin: Negative for color change, pallor, rash and wound.   Allergic/Immunologic: Negative.  Negative for food allergies and immunocompromised state.   Neurological: Positive for weakness. Negative for dizziness, tremors, seizures, syncope, facial asymmetry, speech difficulty, light-headedness, numbness and headaches.   Hematological: Negative.  Negative for adenopathy. Does not bruise/bleed easily.   Psychiatric/Behavioral: Positive for dysphoric mood. Negative for agitation, confusion, hallucinations, sleep disturbance and suicidal ideas. The patient is not nervous/anxious.    All other systems reviewed and are negative.    Objective:     Vital Signs (Most Recent):  Temp: 98.1 °F (36.7 °C) (12/27/19 1032)  Pulse: 81 (12/27/19 1532)  Resp: (!) 22 (12/27/19 1532)  BP: (!) 144/67 (12/27/19 1532)  SpO2: (!) 94 % (12/27/19 1532) Vital Signs (24h Range):  Temp:  [98.1 °F (36.7 °C)] 98.1 °F (36.7 °C)  Pulse:   [73-95] 81  Resp:  [20-47] 22  SpO2:  [91 %-95 %] 94 %  BP: (107-144)/(55-78) 144/67     Weight: 63.8 kg (140 lb 10.5 oz)  Body mass index is 22.7 kg/m².    Physical Exam   Constitutional: She is oriented to person, place, and time. She appears well-developed. No distress.   Catchectic, ill appearing   HENT:   Head: Normocephalic and atraumatic.   Nose: Nose normal.   Hearing Loss   Eyes: Pupils are equal, round, and reactive to light. Conjunctivae and EOM are normal. Right eye exhibits no discharge. Left eye exhibits no discharge.   Neck: Normal range of motion. Neck supple. No JVD present. No tracheal deviation present. No thyromegaly present.   Cardiovascular: Normal rate, regular rhythm and normal heart sounds. Exam reveals no gallop and no friction rub.   No murmur heard.  Pulmonary/Chest: Effort normal and breath sounds normal. No stridor. No respiratory distress. She has no wheezes. She has no rales. She exhibits no tenderness.   Abdominal: Soft. She exhibits no distension and no mass. There is no tenderness. There is no guarding.   Diminished bowel sounds. Abdomen soft, non-tender   Musculoskeletal: Normal range of motion. She exhibits edema. She exhibits no tenderness or deformity.   Lymphadenopathy:     She has no cervical adenopathy.   Neurological: She is alert and oriented to person, place, and time. She has normal reflexes. No cranial nerve deficit. Coordination normal.   Skin: Skin is warm and dry. No rash noted. She is not diaphoretic. No erythema. No pallor.   Psychiatric: She has a normal mood and affect. Her behavior is normal. Judgment and thought content normal.   Nursing note and vitals reviewed.        CRANIAL NERVES     CN III, IV, VI   Pupils are equal, round, and reactive to light.  Extraocular motions are normal.        Significant Labs: All pertinent labs within the past 24 hours have been reviewed.  Results for orders placed or performed during the hospital encounter of 12/27/19    Influenza A & B by Molecular   Result Value Ref Range    Influenza A, Molecular Negative Negative    Influenza B, Molecular Negative Negative    Flu A & B Source NP    CBC auto differential   Result Value Ref Range    WBC 14.58 (H) 3.90 - 12.70 K/uL    RBC 4.48 4.00 - 5.40 M/uL    Hemoglobin 14.6 12.0 - 16.0 g/dL    Hematocrit 46.8 37.0 - 48.5 %    Mean Corpuscular Volume 105 (H) 82 - 98 fL    Mean Corpuscular Hemoglobin 32.6 (H) 27.0 - 31.0 pg    Mean Corpuscular Hemoglobin Conc 31.2 (L) 32.0 - 36.0 g/dL    RDW 13.8 11.5 - 14.5 %    Platelets 593 (H) 150 - 350 K/uL    MPV 8.8 (L) 9.2 - 12.9 fL    Immature Granulocytes 0.7 (H) 0.0 - 0.5 %    Gran # (ANC) 11.6 (H) 1.8 - 7.7 K/uL    Immature Grans (Abs) 0.10 (H) 0.00 - 0.04 K/uL    Lymph # 1.0 1.0 - 4.8 K/uL    Mono # 1.0 0.3 - 1.0 K/uL    Eos # 0.9 (H) 0.0 - 0.5 K/uL    Baso # 0.07 0.00 - 0.20 K/uL    nRBC 0 0 /100 WBC    Gran% 79.8 (H) 38.0 - 73.0 %    Lymph% 6.5 (L) 18.0 - 48.0 %    Mono% 6.6 4.0 - 15.0 %    Eosinophil% 5.9 0.0 - 8.0 %    Basophil% 0.5 0.0 - 1.9 %    Differential Method Automated    Comprehensive metabolic panel   Result Value Ref Range    Sodium 140 136 - 145 mmol/L    Potassium 4.1 3.5 - 5.1 mmol/L    Chloride 98 95 - 110 mmol/L    CO2 32 (H) 23 - 29 mmol/L    Glucose 154 (H) 70 - 110 mg/dL    BUN, Bld 20 10 - 30 mg/dL    Creatinine 0.7 0.5 - 1.4 mg/dL    Calcium 9.6 8.7 - 10.5 mg/dL    Total Protein 6.9 6.0 - 8.4 g/dL    Albumin 2.8 (L) 3.5 - 5.2 g/dL    Total Bilirubin 0.3 0.1 - 1.0 mg/dL    Alkaline Phosphatase 155 (H) 55 - 135 U/L    AST 20 10 - 40 U/L    ALT 8 (L) 10 - 44 U/L    Anion Gap 10 8 - 16 mmol/L    eGFR if African American >60 >60 mL/min/1.73 m^2    eGFR if non African American >60 >60 mL/min/1.73 m^2   Brain natriuretic peptide   Result Value Ref Range    BNP 66 0 - 99 pg/mL   Troponin I   Result Value Ref Range    Troponin I 0.023 0.000 - 0.026 ng/mL   Urinalysis, Reflex to Urine Culture Urine, Catheterized   Result Value Ref  Range    Specimen UA Urine, Catheterized     Color, UA Yellow Yellow, Straw, Shu    Appearance, UA Cloudy (A) Clear    pH, UA >8.0 (A) 5.0 - 8.0    Specific Gravity, UA 1.015 1.005 - 1.030    Protein, UA Negative Negative    Glucose, UA Negative Negative    Ketones, UA Negative Negative    Bilirubin (UA) Negative Negative    Occult Blood UA Negative Negative    Nitrite, UA Positive (A) Negative    Urobilinogen, UA 1.0 <2.0 EU/dL    Leukocytes, UA Negative Negative   TSH   Result Value Ref Range    TSH 1.747 0.400 - 4.000 uIU/mL   Lactic acid, plasma   Result Value Ref Range    Lactate (Lactic Acid) 2.0 0.5 - 2.2 mmol/L   Urinalysis Microscopic   Result Value Ref Range    Bacteria Moderate (A) None-Occ /hpf    Microscopic Comment SEE COMMENT      Significant Imaging: I have reviewed all pertinent imaging results/findings within the past 24 hours.  Imaging Results          CT Chest With Contrast (Final result)  Result time 12/27/19 15:18:50    Final result by Claudio Husain MD (12/27/19 15:18:50)                 Impression:      Subsegmental right middle lobe peripheral alveolar infiltrate, likely a focal pneumonia.    Suspect a pathologic compression fracture through a vertebral body metastasis at T12.  Recommend dedicated MRI of the thoracolumbar spine with and without intravenous contrast for further evaluation.    Stable 7 mm right upper lobe pulmonary nodule.  Recommend 1 year follow-up study.    Four-chamber cardiomegaly.    All CT scans at this facility are performed  using dose modulation techniques as appropriate to performed exam including the following:  automated exposure control; adjustment of mA and/or kV according to the patients size (this includes techniques or standardized protocols for targeted exams where dose is matched to indication/reason for exam: i.e. extremities or head);  iterative reconstruction technique.      Electronically signed by: Claudio Husain MD  Date:    12/27/2019  Time:    15:18              Narrative:    EXAMINATION:  CT CHEST WITH CONTRAST    CLINICAL HISTORY:  Cough, persistent;    TECHNIQUE:  Axial CT images performed through the chest  after  the administration of 75 cc of intravenous contrast.    COMPARISON:  12/27/2019, 07/10/2019, 03/06/2019    FINDINGS:  The evaluation is limited by respiratory motion artifact.    Four-chamber cardiomegaly.  Aortic atherosclerosis.  Trace right pleural effusion.  Subsegmental atelectasis in the right lung base, left lower lobe, and lingula along the major fissure with associated volume loss.  Subsegmental focal alveolar opacity in the periphery of the lateral segment of the right middle lobe.  No thoracic adenopathy.    7 mm right upper lobe pulmonary nodule on axial image 175 of series 3.    No pneumothorax.    Severe, chronic osteoporotic compression fracture of the T10 vertebral body.  Moderate compression fractures T12 and partially imaged compression fracture of L1.  Anterior cortical destruction and lucency at the site of the T12 compression fracture highly suspicious for metastatic disease.                               X-Ray Chest AP Portable (Final result)  Result time 12/27/19 12:27:17    Final result by Claudio Husain MD (12/27/19 12:27:17)                 Impression:      No acute findings.      Electronically signed by: Claudio Husain MD  Date:    12/27/2019  Time:    12:27             Narrative:    EXAMINATION:  XR CHEST AP PORTABLE    CLINICAL HISTORY:  cough;    TECHNIQUE:  AP view of the chest was performed.    COMPARISON:  12/16/2019 and 07/10/2019    FINDINGS:  Bandlike scarring in the lower lobes.  No acute infiltrates detected.    Cardiomegaly.  No pneumothorax.  No acute osseous findings demonstrated.                               X-Ray Abdomen AP 1 View (KUB) (Final result)  Result time 12/27/19 12:25:15    Final result by Claudio Husain MD (12/27/19 12:25:15)                 Impression:      Constipation.      Electronically signed  by: Claudio Husain MD  Date:    12/27/2019  Time:    12:25             Narrative:    EXAMINATION:  XR ABDOMEN AP 1 VIEW    CLINICAL HISTORY:  - Constipation, unspecified.    COMPARISON:  12/16/2019    FINDINGS:  Large amount of stool seen colon and rectum.  No dilated loops of small appreciated.  Moderate to severe remote compression fractures T10 through L1.                               CT Head Without Contrast (Final result)  Result time 12/27/19 11:51:09    Final result by Herminio Miner MD (12/27/19 11:51:09)                 Impression:      Question fluid level right maxillary sinus which could reflect acute sinusitis.    Fluid level within the left mastoid air cells could reflect mastoiditis.    Calcified lesion along the tentorium measuring approximately 2.5 x 1.7 cm thought to reflect a calcified meningioma.    Chronic microvascular ischemic changes.      Electronically signed by: Herminio Miner MD  Date:    12/27/2019  Time:    11:51             Narrative:    EXAMINATION:  CT HEAD WITHOUT CONTRAST    CLINICAL HISTORY:  Confusion/delirium, altered LOC, unexplained;    TECHNIQUE:  Low dose axial CT images obtained throughout the head without intravenous contrast. Sagittal and coronal reconstructions were performed.  All CT scans at this facility use dose modulation, iterative reconstruction and/or weight based dosing when appropriate to reduce radiation dose to as low as reasonably achievable.    COMPARISON:  None.    FINDINGS:  Intracranial compartment: Calcified lesion along the tentorium measuring approximately 2.5 x 1.7 cm thought to reflect a calcified meningioma.    The brain parenchyma demonstrates areas of decreased attenuation with severe periventricular white matter consistent with chronic microvascular ischemic changes..  No parenchymal mass, hemorrhage, edema or major vascular distribution infarct.  Vascular calcifications are noted.    Moderate prominence of the sulci and ventricles are consistent  with age-related involutional changes.    No extra-axial blood or fluid collections.    Skull/extracranial contents (limited evaluation): No fracture. Question fluid level right maxillary sinus which could reflect acute sinusitis.  Diffuse mucosal thickening throughout the paranasal sinuses.  Trace bilateral mastoid effusions.  Fluid level within the left mastoid air cells could reflect mastoiditis.                              Assessment/Plan:     * Pneumonia of right middle lobe due to infectious organism  -Observation for Pneumonia and UTI  -IV Vanco and Zosyn  -CT chest showed RML pneumonia  -pulse ox q 4 hours      Urinary tract infection without hematuria  IV abx  -urine culture      Other constipation  No Bm in 2 weeks  -KUB showed large amt stool in colon and rectum  -Brown bomb enema  -Miralax BID        Compression fracture of T12 vertebra  -currently bedbound      Hearing loss  Has hearing Aids        VTE Risk Mitigation (From admission, onward)    None             Jill Kumar NP  Department of Hospital Medicine   [facility]

## 2019-12-27 NOTE — CONSULTS
Pharmacokinetic Initial Assessment: IV Vancomycin    Assessment/Plan:    Initiate intravenous vancomycin with loading dose of 1500 mg once followed by a maintenance dose of vancomycin 1000 mg IV every 24 hours  Desired empiric serum trough concentration is 15 to 20 mcg/mL  Draw vancomycin trough level 30 min prior to third dose on 12/29/2019 at approximately 1500.   Pharmacy will continue to follow and monitor vancomycin.      Please contact pharmacy at extension [phone] with any questions regarding this assessment.     Thank you for the consult,   Ely Pinto       Patient brief summary:  Ashley Koenig is a 89 y.o. female initiated on antimicrobial therapy with IV Vancomycin for treatment of suspected sepsis    Drug Allergies:   Review of patient's allergies indicates:  No Known Allergies    Actual Body Weight:   63.8 Kg     Renal Function:   Estimated Creatinine Clearance: 47 mL/min (based on SCr of 0.7 mg/dL).,     Dialysis Method (if applicable):  N/A    CBC (last 72 hours):  Recent Labs   Lab Result Units 12/27/19  1055   WBC K/uL 14.58*   Hemoglobin g/dL 14.6   Hematocrit % 46.8   Platelets K/uL 593*   Gran% % 79.8*   Lymph% % 6.5*   Mono% % 6.6   Eosinophil% % 5.9   Basophil% % 0.5   Differential Method  Automated       Metabolic Panel (last 72 hours):  Recent Labs   Lab Result Units 12/27/19  1055 12/27/19  1233   Sodium mmol/L 140  --    Potassium mmol/L 4.1  --    Chloride mmol/L 98  --    CO2 mmol/L 32*  --    Glucose mg/dL 154*  --    Glucose, UA   --  Negative   BUN, Bld mg/dL 20  --    Creatinine mg/dL 0.7  --    Albumin g/dL 2.8*  --    Total Bilirubin mg/dL 0.3  --    Alkaline Phosphatase U/L 155*  --    AST U/L 20  --    ALT U/L 8*  --        Drug levels (last 3 results):  No results for input(s): VANCOMYCINRA, VANCOMYCINPE, VANCOMYCINTR in the last 72 hours.    Microbiologic Results:  Microbiology Results (last 7 days)       Procedure Component Value Units Date/Time    Culture, Respiratory  with Gram Stain [[id number]]     Order Status:  No result Specimen:  Respiratory     Blood culture #2 **CANNOT BE ORDERED STAT** [[id number]] Collected:  12/27/19 1120    Order Status:  Sent Specimen:  Blood from Peripheral, Antecubital, Left Updated:  12/27/19 1145    Influenza A & B by Molecular [[id number]] Collected:  12/27/19 1058    Order Status:  Completed Specimen:  Nasopharyngeal Swab Updated:  12/27/19 1139     Influenza A, Molecular Negative     Influenza B, Molecular Negative     Flu A & B Source NP    Blood culture #1 **CANNOT BE ORDERED STAT** [[id number]] Collected:  12/27/19 1058    Order Status:  Sent Specimen:  Blood from Peripheral, Forearm, Right Updated:  12/27/19 1107

## 2019-12-28 LAB
ANION GAP SERPL CALC-SCNC: 10 MMOL/L (ref 8–16)
BASOPHILS # BLD AUTO: 0.08 K/UL (ref 0–0.2)
BASOPHILS NFR BLD: 0.4 % (ref 0–1.9)
BUN SERPL-MCNC: 17 MG/DL (ref 10–30)
CALCIUM SERPL-MCNC: 9.4 MG/DL (ref 8.7–10.5)
CHLORIDE SERPL-SCNC: 96 MMOL/L (ref 95–110)
CO2 SERPL-SCNC: 33 MMOL/L (ref 23–29)
CREAT SERPL-MCNC: 0.7 MG/DL (ref 0.5–1.4)
DIASTOLIC DYSFUNCTION: NO
DIFFERENTIAL METHOD: ABNORMAL
EOSINOPHIL # BLD AUTO: 0.6 K/UL (ref 0–0.5)
EOSINOPHIL NFR BLD: 3 % (ref 0–8)
ERYTHROCYTE [DISTWIDTH] IN BLOOD BY AUTOMATED COUNT: 13.8 % (ref 11.5–14.5)
EST. GFR  (AFRICAN AMERICAN): >60 ML/MIN/1.73 M^2
EST. GFR  (NON AFRICAN AMERICAN): >60 ML/MIN/1.73 M^2
ESTIMATED PA SYSTOLIC PRESSURE: 16.69
GLUCOSE SERPL-MCNC: 109 MG/DL (ref 70–110)
HCT VFR BLD AUTO: 43.3 % (ref 37–48.5)
HGB BLD-MCNC: 13.5 G/DL (ref 12–16)
IMM GRANULOCYTES # BLD AUTO: 0.17 K/UL (ref 0–0.04)
IMM GRANULOCYTES NFR BLD AUTO: 0.8 % (ref 0–0.5)
LYMPHOCYTES # BLD AUTO: 1.2 K/UL (ref 1–4.8)
LYMPHOCYTES NFR BLD: 5.9 % (ref 18–48)
MCH RBC QN AUTO: 32.5 PG (ref 27–31)
MCHC RBC AUTO-ENTMCNC: 31.2 G/DL (ref 32–36)
MCV RBC AUTO: 104 FL (ref 82–98)
MONOCYTES # BLD AUTO: 1.4 K/UL (ref 0.3–1)
MONOCYTES NFR BLD: 6.8 % (ref 4–15)
NEUTROPHILS # BLD AUTO: 16.9 K/UL (ref 1.8–7.7)
NEUTROPHILS NFR BLD: 83.1 % (ref 38–73)
NRBC BLD-RTO: 0 /100 WBC
PLATELET # BLD AUTO: 602 K/UL (ref 150–350)
PMV BLD AUTO: 9 FL (ref 9.2–12.9)
POTASSIUM SERPL-SCNC: 3.9 MMOL/L (ref 3.5–5.1)
RBC # BLD AUTO: 4.15 M/UL (ref 4–5.4)
RETIRED EF AND QEF - SEE NOTES: 60 (ref 55–65)
SODIUM SERPL-SCNC: 139 MMOL/L (ref 136–145)
WBC # BLD AUTO: 20.36 K/UL (ref 3.9–12.7)

## 2019-12-28 PROCEDURE — 93306 TTE W/DOPPLER COMPLETE: CPT

## 2019-12-28 PROCEDURE — 25000003 PHARM REV CODE 250: Performed by: NURSE PRACTITIONER

## 2019-12-28 PROCEDURE — 27000221 HC OXYGEN, UP TO 24 HOURS

## 2019-12-28 PROCEDURE — 85025 COMPLETE CBC W/AUTO DIFF WBC: CPT

## 2019-12-28 PROCEDURE — 94761 N-INVAS EAR/PLS OXIMETRY MLT: CPT

## 2019-12-28 PROCEDURE — 93306 2D ECHO WITH COLOR FLOW DOPPLER: ICD-10-PCS | Mod: 26,,, | Performed by: INTERNAL MEDICINE

## 2019-12-28 PROCEDURE — 93306 TTE W/DOPPLER COMPLETE: CPT | Mod: 26,,, | Performed by: INTERNAL MEDICINE

## 2019-12-28 PROCEDURE — 80048 BASIC METABOLIC PNL TOTAL CA: CPT

## 2019-12-28 PROCEDURE — 21400001 HC TELEMETRY ROOM

## 2019-12-28 PROCEDURE — G8982 BODY POS GOAL STATUS: HCPCS | Mod: CM

## 2019-12-28 PROCEDURE — G8981 BODY POS CURRENT STATUS: HCPCS | Mod: CM

## 2019-12-28 PROCEDURE — 97530 THERAPEUTIC ACTIVITIES: CPT

## 2019-12-28 PROCEDURE — 97162 PT EVAL MOD COMPLEX 30 MIN: CPT

## 2019-12-28 PROCEDURE — 63600175 PHARM REV CODE 636 W HCPCS: Performed by: EMERGENCY MEDICINE

## 2019-12-28 PROCEDURE — G8983 BODY POS D/C STATUS: HCPCS | Mod: CM

## 2019-12-28 PROCEDURE — 36415 COLL VENOUS BLD VENIPUNCTURE: CPT

## 2019-12-28 PROCEDURE — 92610 EVALUATE SWALLOWING FUNCTION: CPT

## 2019-12-28 PROCEDURE — 63600175 PHARM REV CODE 636 W HCPCS: Performed by: NURSE PRACTITIONER

## 2019-12-28 PROCEDURE — 97167 OT EVAL HIGH COMPLEX 60 MIN: CPT

## 2019-12-28 PROCEDURE — 99900035 HC TECH TIME PER 15 MIN (STAT)

## 2019-12-28 RX ADMIN — VANCOMYCIN HYDROCHLORIDE 1000 MG: 1 INJECTION, POWDER, LYOPHILIZED, FOR SOLUTION INTRAVENOUS at 05:12

## 2019-12-28 RX ADMIN — ESCITALOPRAM OXALATE 20 MG: 10 TABLET ORAL at 09:12

## 2019-12-28 RX ADMIN — PIPERACILLIN AND TAZOBACTAM 4.5 G: 4; .5 INJECTION, POWDER, FOR SOLUTION INTRAVENOUS at 05:12

## 2019-12-28 RX ADMIN — PIPERACILLIN AND TAZOBACTAM 4.5 G: 4; .5 INJECTION, POWDER, FOR SOLUTION INTRAVENOUS at 02:12

## 2019-12-28 RX ADMIN — POLYETHYLENE GLYCOL (3350) 17 G: 17 POWDER, FOR SOLUTION ORAL at 09:12

## 2019-12-28 RX ADMIN — ASPIRIN 81 MG: 81 TABLET, COATED ORAL at 09:12

## 2019-12-28 RX ADMIN — PIPERACILLIN AND TAZOBACTAM 4.5 G: 4; .5 INJECTION, POWDER, FOR SOLUTION INTRAVENOUS at 09:12

## 2019-12-28 NOTE — PROGRESS NOTES
[facility] Hospital Medicine  Progress Note    Patient Name: Ashley Koenig  MRN: [id number]  Patient Class: IP- Inpatient   Admission Date: 12/27/2019  Length of Stay: 1 days  Attending Physician: Gabriel Oliveira MD  Primary Care Provider: Maggy Ngo MD        Subjective:     Principal Problem:Pneumonia of right middle lobe due to infectious organism    HPI:  Ashley Koenig is a 89 y.o. female patient with a PMHx of CHF, COPD, HTN, HLD, and compression fracture who presented to the ER for  cough, onset several days PTA. EMS reports that the pt is on 2L of O2 at home, but had an O2 saturation of 88% at the scene. Associated sxs included generalized weakness, cough, constipation x 2 weeks, confusion x 2 days, bedbound status x 1 month. Patient denies any fever, chills, n/v/d, SOB, CP, numbness, dizziness, headache, and all other sxs at this time. Pt is currently on Doxycycline for her cough. Pt suffered a compression fracture following a fall 1 month ago, and is currently following with Pain Management for non-operative treatment.  Son, KIMBERLY, Brenton Koenig, is SDM [phone] and stated patient is DNR. In ER, CT chest showed RML pneumonia, compression Fx T12, cardiomegaly. KUB showed Large amount stool in colon and rectum. Patient is on 3.5 liters in ER and is unstable for discharge. Son states she has declined in 30 days. When asked, he stated he would like informational visit for Hospice. Social service consult for discharge planning. Observation for Pneumonia and UTI.     Overview/Hospital Course:  No notes on file    Interval History:  Endorses weakness and fatigue.  Actively coughing.  No respiratory distress.    Review of Systems   Constitutional: Positive for fatigue. Negative for chills and fever.   HENT: Negative for congestion and sore throat.    Eyes: Negative for visual disturbance.   Respiratory: Positive for cough and shortness of breath. Negative for wheezing.     Cardiovascular: Negative for chest pain, palpitations and leg swelling.   Gastrointestinal: Negative for abdominal pain, blood in stool, constipation, diarrhea, nausea and vomiting.   Genitourinary: Negative for dysuria and hematuria.   Musculoskeletal: Negative for arthralgias and back pain.   Skin: Negative for rash and wound.   Neurological: Positive for weakness. Negative for dizziness, light-headedness and numbness.   Hematological: Negative for adenopathy.     Objective:     Vital Signs (Most Recent):  Temp: 98.5 °F (36.9 °C) (12/28/19 1205)  Pulse: 84 (12/28/19 1205)  Resp: 18 (12/28/19 1205)  BP: 131/63 (12/28/19 1205)  SpO2: (!) 94 % (12/28/19 1205) Vital Signs (24h Range):  Temp:  [97.7 °F (36.5 °C)-98.5 °F (36.9 °C)] 98.5 °F (36.9 °C)  Pulse:  [] 84  Resp:  [18-47] 18  SpO2:  [91 %-95 %] 94 %  BP: (131-163)/(63-69) 131/63     Weight: 60.7 kg (133 lb 13.1 oz)  Body mass index is 21.6 kg/m².    Intake/Output Summary (Last 24 hours) at 12/28/2019 1433  Last data filed at 12/28/2019 0543  Gross per 24 hour   Intake 850 ml   Output 3 ml   Net 847 ml      Physical Exam   Constitutional: She is oriented to person, place, and time. She appears well-developed and well-nourished. No distress.   HENT:   Head: Normocephalic and atraumatic.   Mouth/Throat: Oropharynx is clear and moist.   Eyes: Pupils are equal, round, and reactive to light. Conjunctivae and EOM are normal.   Neck: Neck supple. No JVD present. No thyromegaly present.   Cardiovascular: Normal rate and regular rhythm. Exam reveals no gallop and no friction rub.   No murmur heard.  Pulmonary/Chest: Effort normal. She has no wheezes. She has no rales.   Coarse crackles right upper lobe.  Coarse cough.   Abdominal: Soft. Bowel sounds are normal. She exhibits no distension. There is no tenderness. There is no rebound and no guarding.   Musculoskeletal: Normal range of motion. She exhibits no edema or deformity.   Lymphadenopathy:     She has no  cervical adenopathy.   Neurological: She is alert and oriented to person, place, and time. She has normal reflexes.   Skin: Skin is warm and dry. No rash noted.   Psychiatric: She has a normal mood and affect. Her behavior is normal. Judgment and thought content normal.   Nursing note and vitals reviewed.      Significant Labs: All pertinent labs within the past 24 hours have been reviewed.    Significant Imaging: I have reviewed all pertinent imaging results/findings within the past 24 hours.      Assessment/Plan:      * Pneumonia of right middle lobe due to infectious organism  Coarse cough with weakness and fatigue.  -IV Vanco and Zosyn  -CT chest showed RML pneumonia  -pulse ox q 4 hours    Urinary tract infection without hematuria  IV abx  -urine culture pending  No urinary symptoms.    Compression fracture of T12 vertebra  -currently bedbound      Other constipation  No Bm in 2 weeks  -KUB showed large amt stool in colon and rectum  -Brown bomb enema  -Miralax BID        Hearing loss  Has hearing Aids        VTE Risk Mitigation (From admission, onward)    None                Gabriel Oliveira MD  Department of Hospital Medicine   [facility]

## 2019-12-28 NOTE — PLAN OF CARE
ADRIANA attempted to complete consult with patient regarding hospice and discharge planning assessment.  However the patient was not responsive to complete the discharge assessment.  ADRIANA inquired with attending physician to determine whether or not the patient and patient's family had been talked to regarding hospice.   Informed ADRIANA that he was not sure, but would find out.  Attending physic an later messaged ADRIANA to inform that the patient's son had been made aware of recommendation for hospice service and would be prepared to talk with CM on Sunday.

## 2019-12-28 NOTE — PT/OT/SLP EVAL
Occupational Therapy   Evaluation and Discharge Note    Name: Ashley Koenig  MRN: [id number]  Admitting Diagnosis:  Pneumonia of right middle lobe due to infectious organism      Recommendations:     Discharge Recommendations: nursing facility, basic  Discharge Equipment Recommendations:  none  Barriers to discharge:  None    Assessment:     Ashley Koenig is a 89 y.o. female with a medical diagnosis of Pneumonia of right middle lobe due to infectious organism. At this time, patient is functioning at their prior level of function and does not require further acute OT services.     Plan:     During this hospitalization, patient does not require further acute OT services.  Please re-consult if situation changes.    · Plan of Care Reviewed with: patient    Subjective     Chief Complaint: debility and generalized weakness. unable to follow commands and participate with therapy session.  Patient/Family Comments/goals:   Occupational Profile:  Living Environment: poor historian and unintelligible verbage  Previous level of function: poor historian and unintelligible verbage  Roles and Routines: occupational therapy  Equipment Used at home:  (unable to obtain)  Assistance upon Discharge:     Pain/Comfort:  · Pain Rating 1: (pt unable to verbalize pain level.  pt moan during movement)    Patients cultural, spiritual, Adventism conflicts given the current situation:      Objective:     Communicated with:  Nurse sandra and epic chart review prior to session.  Patient found HOB elevated with telemetry upon OT entry to room.    General Precautions: Standard, fall   Orthopedic Precautions:N/A   Braces: N/A     Occupational Performance:    Bed Mobility:    · Patient completed Rolling/Turning to Left with  maximal assistance and 2 persons  · Patient completed Rolling/Turning to Right with maximal assistance and 2 persons  · Patient completed Scooting/Bridging with maximal assistance and 2 persons  · Patient completed Supine to  Sit with maximal assistance and 2 persons  · Patient completed Sit to Supine with maximal assistance and 2 persons    Activities of Daily Living:  · Upper Body Dressing: total assistance .  · Lower Body Dressing: total assistance .  · Toileting: total assistance .    Cognitive/Visual Perceptual:  Cognitive/Psychosocial Skills:     -       Oriented to: unable to accurately assess   -       Follows Commands/attention:Inattentive  -       Communication: poor historian and unintelligible verbage  -       Memory: poor historian and unintelligible verbage  -       Safety awareness/insight to disability: impaired     Physical Exam:  Upper Extremity Range of Motion:     -       Right Upper Extremity: aarom wfl  -       Left Upper Extremity: aarom wfl    AMPAC 6 Click ADL:  AMPAC Total Score: 6    Treatment & Education:    Education:    Patient left HOB elevated with all lines intact, call button in reach, bed alarm on and nurse notified    GOALS:   Multidisciplinary Problems     Occupational Therapy Goals     Not on file                History:     Past Medical History:   Diagnosis Date    Acute congestive heart failure 3/6/2019    Age-related macular degeneration, wet, both eyes     Anginal equivalent     Anxiety     Arthritis     BPPV (benign paroxysmal positional vertigo)     Brain tumor     Clotting disorder     lung    COPD (chronic obstructive pulmonary disease)     Depression     Diastolic dysfunction     Hearing loss     Heart disease     Hyperlipidemia     Hypertension     Hypoxemia     Lung nodule     Meningioma     Multiple thyroid nodules     Pulmonary embolism     Pulmonary hypertension     Sepsis due to pneumonia     Sleep apnea in adult     Stroke     per eye exam, never been treated for    Urinary incontinence     Vitamin D deficiency        Past Surgical History:   Procedure Laterality Date    CATARACT EXTRACTION Bilateral     TOTAL HIP ARTHROPLASTY Right 08/20/2016    aafter fx        Time Tracking:     OT Date of Treatment: 12/28/19  OT Start Time: 0850  OT Stop Time: 0915  OT Total Time (min): 25 min    Billable Minutes:Evaluation 10 minutes  Therapeutic Activity 15 minutes    Lulu Boateng OT  12/28/2019

## 2019-12-28 NOTE — ED NOTES
Unsuccessful attempt at brown bomb enema performed. Entire bag was emptied into pt but pt was unable to obtain fluid.

## 2019-12-28 NOTE — ASSESSMENT & PLAN NOTE
Coarse cough with weakness and fatigue.  -IV Vanco and Zosyn  -CT chest showed RML pneumonia  -pulse ox q 4 hours

## 2019-12-28 NOTE — PLAN OF CARE
Pt is awake and oriented. Vital Signs stable. No acute events noted. Care plan reviewed and pt verbalized understanding. Call light is in reach. Will continue to monitor. PIV CDI. Pt oriented to self. She rested peacefully overnight. Bed alarm is on. Patient voiced no complaints. No BM noted. Will continue to monitor.   01-May-2019

## 2019-12-28 NOTE — SUBJECTIVE & OBJECTIVE
Interval History:  Endorses weakness and fatigue.  Actively coughing.  No respiratory distress.    Review of Systems   Constitutional: Positive for fatigue. Negative for chills and fever.   HENT: Negative for congestion and sore throat.    Eyes: Negative for visual disturbance.   Respiratory: Positive for cough and shortness of breath. Negative for wheezing.    Cardiovascular: Negative for chest pain, palpitations and leg swelling.   Gastrointestinal: Negative for abdominal pain, blood in stool, constipation, diarrhea, nausea and vomiting.   Genitourinary: Negative for dysuria and hematuria.   Musculoskeletal: Negative for arthralgias and back pain.   Skin: Negative for rash and wound.   Neurological: Positive for weakness. Negative for dizziness, light-headedness and numbness.   Hematological: Negative for adenopathy.     Objective:     Vital Signs (Most Recent):  Temp: 98.5 °F (36.9 °C) (12/28/19 1205)  Pulse: 84 (12/28/19 1205)  Resp: 18 (12/28/19 1205)  BP: 131/63 (12/28/19 1205)  SpO2: (!) 94 % (12/28/19 1205) Vital Signs (24h Range):  Temp:  [97.7 °F (36.5 °C)-98.5 °F (36.9 °C)] 98.5 °F (36.9 °C)  Pulse:  [] 84  Resp:  [18-47] 18  SpO2:  [91 %-95 %] 94 %  BP: (131-163)/(63-69) 131/63     Weight: 60.7 kg (133 lb 13.1 oz)  Body mass index is 21.6 kg/m².    Intake/Output Summary (Last 24 hours) at 12/28/2019 1433  Last data filed at 12/28/2019 0543  Gross per 24 hour   Intake 850 ml   Output 3 ml   Net 847 ml      Physical Exam   Constitutional: She is oriented to person, place, and time. She appears well-developed and well-nourished. No distress.   HENT:   Head: Normocephalic and atraumatic.   Mouth/Throat: Oropharynx is clear and moist.   Eyes: Pupils are equal, round, and reactive to light. Conjunctivae and EOM are normal.   Neck: Neck supple. No JVD present. No thyromegaly present.   Cardiovascular: Normal rate and regular rhythm. Exam reveals no gallop and no friction rub.   No murmur  heard.  Pulmonary/Chest: Effort normal. She has no wheezes. She has no rales.   Coarse crackles right upper lobe.  Coarse cough.   Abdominal: Soft. Bowel sounds are normal. She exhibits no distension. There is no tenderness. There is no rebound and no guarding.   Musculoskeletal: Normal range of motion. She exhibits no edema or deformity.   Lymphadenopathy:     She has no cervical adenopathy.   Neurological: She is alert and oriented to person, place, and time. She has normal reflexes.   Skin: Skin is warm and dry. No rash noted.   Psychiatric: She has a normal mood and affect. Her behavior is normal. Judgment and thought content normal.   Nursing note and vitals reviewed.      Significant Labs: All pertinent labs within the past 24 hours have been reviewed.    Significant Imaging: I have reviewed all pertinent imaging results/findings within the past 24 hours.

## 2019-12-28 NOTE — PT/OT/SLP EVAL
Physical Therapy Evaluation    Patient Name:  Ashley Koenig   MRN:  [id number]    Recommendations:     Discharge Recommendations:  nursing facility, basic   Discharge Equipment Recommendations:     Barriers to discharge: Inaccessible home and Decreased caregiver support    Assessment:     Ashley Koenig is a 89 y.o. female admitted with a medical diagnosis of Pneumonia of right middle lobe due to infectious organism.  She presents with the following impairments/functional limitations:  weakness, impaired cognition, impaired self care skills. Ms Koenig is unable to follow any commands or willing to participate.  Not appropriate for PT at this time. Will place on people movers for PROM to joints as long as she is willing.    Rehab Prognosis: Poor; patient would benefit from acute skilled PT services to address these deficits and reach maximum level of function.    Recent Surgery: * No surgery found *      Plan:     During this hospitalization, patient to be seen   to address the identified rehab impairments via   and progress toward the following goals:    · Plan of Care Expires:       Subjective     Chief Complaint: unable to answer  Patient/Family Comments/goals: unable to answer  Pain/Comfort:  ·  pt grimaces with any attempt at moving    Patients cultural, spiritual, Scientology conflicts given the current situation:      Living Environment:  Unable to obtain any subjective  Prior to admission, patients level of function was unknown.  Equipment used at home: other (see comments)(unable to obtain).  DME owned (not currently used): unknown.  Upon discharge, patient will have assistance from unsure.    Objective:     Communicated with nurse Hernandez prior to session.  Patient found supine with    upon PT entry to room.    General Precautions: Standard,     Orthopedic Precautions:N/A   Braces:       Exams:  · Unable to perform    Functional Mobility:  · Bed Mobility:     · Supine to Sit: maximal assistance and of 2  persons      Therapeutic Activities and Exercises:   Performed supine to sit with max X 2. Once sitting pt moaning in pain.  Assisted back to supine position    AM-PAC 6 CLICK MOBILITY  Total Score:8     Patient left supine with all lines intact, call button in reach and bed alarm on.    GOALS:   Multidisciplinary Problems     Physical Therapy Goals        Problem: Physical Therapy Goal    Goal Priority Disciplines Outcome Goal Variances Interventions   Physical Therapy Goal     PT, PT/OT      Description:  PT eval complete. Pt unable to follow any commands and obviously very fearful with attempts to sit.  Will place on people movers for joint PROM/contracture prevention as long as pt willing                    History:     Past Medical History:   Diagnosis Date    Acute congestive heart failure 3/6/2019    Age-related macular degeneration, wet, both eyes     Anginal equivalent     Anxiety     Arthritis     BPPV (benign paroxysmal positional vertigo)     Brain tumor     Clotting disorder     lung    COPD (chronic obstructive pulmonary disease)     Depression     Diastolic dysfunction     Hearing loss     Heart disease     Hyperlipidemia     Hypertension     Hypoxemia     Lung nodule     Meningioma     Multiple thyroid nodules     Pulmonary embolism     Pulmonary hypertension     Sepsis due to pneumonia     Sleep apnea in adult     Stroke     per eye exam, never been treated for    Urinary incontinence     Vitamin D deficiency        Past Surgical History:   Procedure Laterality Date    CATARACT EXTRACTION Bilateral     TOTAL HIP ARTHROPLASTY Right 08/20/2016    aafter fx       Time Tracking:     PT Received On: 12/28/19  PT Start Time: 0815     PT Stop Time: 0830  PT Total Time (min): 15 min     Billable Minutes: Evaluation 15      Juarez Ellington, PT  12/28/2019

## 2019-12-28 NOTE — PT/OT/SLP EVAL
Speech Language Pathology Evaluation  Bedside Swallow    Patient Name:  Ashley Koenig   MRN:  [id number]  Admitting Diagnosis: Pneumonia of right middle lobe due to infectious organism    Recommendations:                 General Recommendations:  aspiration precautions  Diet recommendations:  NPO, NPO   Aspiration Precautions: in place  General Precautions: Standard, aspiration  Communication strategies:  verbal    History:   Pt is a 89 year old female admitted with cough, constipation, confusion.  Pt admitted with RML Pneumonia and UTI.  Pt's son reported pt had a compression fracture x1 month ago.  She also has a history of Dysphagia and was on Pureed diet and Nectar thick liquids about 6 months ago but reportedly took herself off of it to a Regular consistency diet and thin liquids.    Past Medical History:   Diagnosis Date    Acute congestive heart failure 3/6/2019    Age-related macular degeneration, wet, both eyes     Anginal equivalent     Anxiety     Arthritis     BPPV (benign paroxysmal positional vertigo)     Brain tumor     Clotting disorder     lung    COPD (chronic obstructive pulmonary disease)     Depression     Diastolic dysfunction     Hearing loss     Heart disease     Hyperlipidemia     Hypertension     Hypoxemia     Lung nodule     Meningioma     Multiple thyroid nodules     Pulmonary embolism     Pulmonary hypertension     Sepsis due to pneumonia     Sleep apnea in adult     Stroke     per eye exam, never been treated for    Urinary incontinence     Vitamin D deficiency        Past Surgical History:   Procedure Laterality Date    CATARACT EXTRACTION Bilateral     TOTAL HIP ARTHROPLASTY Right 08/20/2016    aafter fx       Social History: Patient lives with her son    Prior Intubation HX:  n/a    Modified Barium Swallow: n/a    Chest X-Rays: Pt diagnosed with RML Pneumonia     Prior diet: see history note    Occupation/hobbies/homemaking: n/a    Subjective     Pt sitting  up in bed and willing to work with S.T.   Patient goals: Pt did not state goals.    Pain/Comfort:  · Pain Rating 1: 0/10    Objective:     Oral Musculature Evaluation  · Oral Musculature: general weakness    Bedside Swallow Eval:   Consistencies Assessed:  · Thin Liquids, Thick Liquids, Pureed, Cracker    Oral Phase:   · Pt with decreased chewing skills with cracker;  Minimal oral delays with swallowing all consistencies    Pharyngeal Phase:   · Pt with delayed coughing x1 on thin liquids    Compensatory Strategies  · Basic compensatory strategies utilized    Treatment: Pt needed some cues to follow basic commands; she was not oriented to place, time or situation.     Assessment:     Ashley Koenig is a 89 y.o. female with an SLP diagnosis of Dysphagia.  Pt has a history of Dysphagia and has a new diagnosis of RML Pneumonia.   Pt has been recommended for a MBSS if M.D. Approves to further assess swallowing.   Pt and her son were educated on results/recommendations.      Goals:   Multidisciplinary Problems     SLP Goals        Problem: SLP Goal    Goal Priority Disciplines Outcome   SLP Goal     SLP    Description:  LTG:  Pt will tolerate least restrictive diet consistency safely and efficiently.    ST. MBSS to further assess swallowing.             2. Laryngeal and Pharyngeal exercises x20 to improve swallowing.               3. Oral Motor tasks x20  Reassess goal by Bennett 3, 2019                    Plan:     · Patient to be seen:    3-5x weekly  · Plan of Care expires:   20  · Plan of Care reviewed with:  patient, son   · SLP Follow-Up:     yes     Discharge recommendations:    Pt will need further S.T. Upon discharge to follow swallowing  Barriers to Discharge:  Needs assistances    Time Tracking:     SLP Treatment Date:   19  Speech Start Time:  1000  Speech Stop Time:  1030     Speech Total Time (min):  30 min    Billable Minutes: 30 minutes    Araceli Garcia CCC-SLP  2019

## 2019-12-29 LAB
ANION GAP SERPL CALC-SCNC: 11 MMOL/L (ref 8–16)
BASOPHILS # BLD AUTO: 0.08 K/UL (ref 0–0.2)
BASOPHILS NFR BLD: 0.4 % (ref 0–1.9)
BUN SERPL-MCNC: 14 MG/DL (ref 10–30)
CALCIUM SERPL-MCNC: 9.4 MG/DL (ref 8.7–10.5)
CHLORIDE SERPL-SCNC: 94 MMOL/L (ref 95–110)
CO2 SERPL-SCNC: 33 MMOL/L (ref 23–29)
CREAT SERPL-MCNC: 0.7 MG/DL (ref 0.5–1.4)
DIFFERENTIAL METHOD: ABNORMAL
EOSINOPHIL # BLD AUTO: 0.7 K/UL (ref 0–0.5)
EOSINOPHIL NFR BLD: 4 % (ref 0–8)
ERYTHROCYTE [DISTWIDTH] IN BLOOD BY AUTOMATED COUNT: 13.6 % (ref 11.5–14.5)
EST. GFR  (AFRICAN AMERICAN): >60 ML/MIN/1.73 M^2
EST. GFR  (NON AFRICAN AMERICAN): >60 ML/MIN/1.73 M^2
GLUCOSE SERPL-MCNC: 92 MG/DL (ref 70–110)
HCT VFR BLD AUTO: 43 % (ref 37–48.5)
HGB BLD-MCNC: 13.3 G/DL (ref 12–16)
IMM GRANULOCYTES # BLD AUTO: 0.11 K/UL (ref 0–0.04)
IMM GRANULOCYTES NFR BLD AUTO: 0.6 % (ref 0–0.5)
LYMPHOCYTES # BLD AUTO: 1.1 K/UL (ref 1–4.8)
LYMPHOCYTES NFR BLD: 6.2 % (ref 18–48)
MCH RBC QN AUTO: 32.6 PG (ref 27–31)
MCHC RBC AUTO-ENTMCNC: 30.9 G/DL (ref 32–36)
MCV RBC AUTO: 105 FL (ref 82–98)
MONOCYTES # BLD AUTO: 1.2 K/UL (ref 0.3–1)
MONOCYTES NFR BLD: 6.7 % (ref 4–15)
NEUTROPHILS # BLD AUTO: 14.6 K/UL (ref 1.8–7.7)
NEUTROPHILS NFR BLD: 82.1 % (ref 38–73)
NRBC BLD-RTO: 0 /100 WBC
PLATELET # BLD AUTO: 571 K/UL (ref 150–350)
PMV BLD AUTO: 9 FL (ref 9.2–12.9)
POTASSIUM SERPL-SCNC: 3.7 MMOL/L (ref 3.5–5.1)
RBC # BLD AUTO: 4.08 M/UL (ref 4–5.4)
SODIUM SERPL-SCNC: 138 MMOL/L (ref 136–145)
WBC # BLD AUTO: 17.82 K/UL (ref 3.9–12.7)

## 2019-12-29 PROCEDURE — 94761 N-INVAS EAR/PLS OXIMETRY MLT: CPT

## 2019-12-29 PROCEDURE — 27000221 HC OXYGEN, UP TO 24 HOURS

## 2019-12-29 PROCEDURE — 85025 COMPLETE CBC W/AUTO DIFF WBC: CPT

## 2019-12-29 PROCEDURE — 92611 MOTION FLUOROSCOPY/SWALLOW: CPT

## 2019-12-29 PROCEDURE — 25000003 PHARM REV CODE 250: Performed by: NURSE PRACTITIONER

## 2019-12-29 PROCEDURE — 21400001 HC TELEMETRY ROOM

## 2019-12-29 PROCEDURE — 36415 COLL VENOUS BLD VENIPUNCTURE: CPT

## 2019-12-29 PROCEDURE — 80048 BASIC METABOLIC PNL TOTAL CA: CPT

## 2019-12-29 PROCEDURE — 63600175 PHARM REV CODE 636 W HCPCS: Performed by: INTERNAL MEDICINE

## 2019-12-29 PROCEDURE — 63600175 PHARM REV CODE 636 W HCPCS: Performed by: NURSE PRACTITIONER

## 2019-12-29 RX ADMIN — AZITHROMYCIN MONOHYDRATE 500 MG: 500 INJECTION, POWDER, LYOPHILIZED, FOR SOLUTION INTRAVENOUS at 12:12

## 2019-12-29 RX ADMIN — POLYETHYLENE GLYCOL (3350) 17 G: 17 POWDER, FOR SOLUTION ORAL at 09:12

## 2019-12-29 RX ADMIN — ESCITALOPRAM OXALATE 20 MG: 10 TABLET ORAL at 09:12

## 2019-12-29 RX ADMIN — TRAMADOL HYDROCHLORIDE 50 MG: 50 TABLET, FILM COATED ORAL at 11:12

## 2019-12-29 RX ADMIN — CEFTRIAXONE 1 G: 1 INJECTION, SOLUTION INTRAVENOUS at 04:12

## 2019-12-29 RX ADMIN — ASPIRIN 81 MG: 81 TABLET, COATED ORAL at 09:12

## 2019-12-29 RX ADMIN — PIPERACILLIN AND TAZOBACTAM 4.5 G: 4; .5 INJECTION, POWDER, FOR SOLUTION INTRAVENOUS at 06:12

## 2019-12-29 NOTE — PLAN OF CARE
TC to pt's sonBrenton to discuss plans for d/c.. Patient is dependent with adls and iadls. Referral sent to [facility]; son scheduled to meet with agency for information visit. Instructed  family to call with any questions or concerns.    D/C Plan: Home with hospice  PCP: Maggy Ngo MD  Discharge transportation: TBD  My Alexaner: Active       12/29/19 7249   Discharge Assessment   Assessment Type Discharge Planning Assessment   Confirmed/corrected address and phone number on facesheet? Yes  (Information obtained form pt's sonBrenton.)   Assessment information obtained from? Caregiver   Expected Length of Stay (days)   (DC pending hospice decision)   Communicated expected length of stay with patient/caregiver yes   Prior to hospitilization cognitive status: Unable to Assess   Prior to hospitalization functional status: Needs Assistance   Current cognitive status: Unable to Assess   Current Functional Status: Completely Dependent   Lives With other relative(s)   Able to Return to Prior Arrangements other (see comments)  (Possible return home with hospice)   Is patient able to care for self after discharge? No   Who are your caregiver(s) and their phone number(s)? Brenton Koenig (son) [phone]   Patient's perception of discharge disposition other (comments)  (Unable to assess)   Readmission Within the Last 30 Days no previous admission in last 30 days   Patient currently being followed by outpatient case management? No   Patient currently receives any other outside agency services? No   Equipment Currently Used at Home none   Do you have any problems affording any of your prescribed medications? No   Is the patient taking medications as prescribed? yes   Does the patient have transportation home? Yes   Transportation Anticipated other (see comments)  (Unknown at present)   Does the patient receive services at the [facility]? No   Discharge Plan A Hospice/home   Discharge Plan B Hospice/home    DME Needed Upon Discharge  other (see comments)  (TBD)   Patient/Family in Agreement with Plan yes

## 2019-12-29 NOTE — PLAN OF CARE
Assumed care of patient at 1600.   Patient appears to be resting comfortably in bed. No complaints of pain or discomfort.  Patient is alert, oriented to self only.  VSS. Afebrile.  SR on monitor.  Patient received IV antibiotics this shift for UTI and pneumonia.  Bed low, side rails up x2, wheels locked, call light in reach.   Patient has no outside visitors at this time.  POC reviewed. No evidence of learning.  Will continue to monitor.

## 2019-12-29 NOTE — PLAN OF CARE
Pt is awake and oriented. Vital Signs stable. No acute events noted. Care plan reviewed and pt verbalized understanding. Call light is in reach. Will continue to monitor. PIV CDI. Pt remains very sleepy, and slept throughout the shift. While awake she was oriented to person. No acute events overnight. Will continue to monitor.

## 2019-12-29 NOTE — PROGRESS NOTES
[facility] Hospital Medicine  Progress Note    Patient Name: Ashley Koenig  MRN: [id number]  Patient Class: IP- Inpatient   Admission Date: 12/27/2019  Length of Stay: 2 days  Attending Physician: Gabriel Oliveira MD  Primary Care Provider: Maggy Ngo MD        Subjective:     Principal Problem:Pneumonia of right middle lobe due to infectious organism    HPI:  Ashley Koenig is a 89 y.o. female patient with a PMHx of CHF, COPD, HTN, HLD, and compression fracture who presented to the ER for  cough, onset several days PTA. EMS reports that the pt is on 2L of O2 at home, but had an O2 saturation of 88% at the scene. Associated sxs included generalized weakness, cough, constipation x 2 weeks, confusion x 2 days, bedbound status x 1 month. Patient denies any fever, chills, n/v/d, SOB, CP, numbness, dizziness, headache, and all other sxs at this time. Pt is currently on Doxycycline for her cough. Pt suffered a compression fracture following a fall 1 month ago, and is currently following with Pain Management for non-operative treatment.  Son, KIMBERLY, Brenton Koenig, is SDM [phone] and stated patient is DNR. In ER, CT chest showed RML pneumonia, compression Fx T12, cardiomegaly. KUB showed Large amount stool in colon and rectum. Patient is on 3.5 liters in ER and is unstable for discharge. Son states she has declined in 30 days. When asked, he stated he would like informational visit for Hospice. Social service consult for discharge planning. Observation for Pneumonia and UTI.     Overview/Hospital Course:  Admitted for evaluation and treatment of weakness and cough suspicious for pneumonia.  Empirically started Vancomycin and Zosyn then changed to Ceftriaxone and Azithromycin.  CT of the chest with a RML infiltrate and productive cough.  SLP evaluation with MBSS.  No aspiration and recommend mechanical soft with thin liquids.  Palliative care to assess and discuss long term care goals with  family.    Interval History:  Endorses weakness and fatigue.  Actively coughing.  No respiratory distress.  No acute problems overnight.  Appetite returning.  MBSS today.    Review of Systems   Constitutional: Positive for fatigue. Negative for chills and fever.   HENT: Negative for congestion and sore throat.    Eyes: Negative for visual disturbance.   Respiratory: Positive for cough and shortness of breath. Negative for wheezing.    Cardiovascular: Negative for chest pain, palpitations and leg swelling.   Gastrointestinal: Negative for abdominal pain, blood in stool, constipation, diarrhea, nausea and vomiting.   Genitourinary: Negative for dysuria and hematuria.   Musculoskeletal: Negative for arthralgias and back pain.   Skin: Negative for rash and wound.   Neurological: Positive for weakness. Negative for dizziness, light-headedness and numbness.   Hematological: Negative for adenopathy.     Objective:     Vital Signs (Most Recent):  Temp: 98.3 °F (36.8 °C) (12/29/19 1109)  Pulse: 80 (12/29/19 1109)  Resp: 18 (12/29/19 1109)  BP: (!) 142/66 (12/29/19 1109)  SpO2: (!) 94 % (12/29/19 1109) Vital Signs (24h Range):  Temp:  [97.9 °F (36.6 °C)-98.8 °F (37.1 °C)] 98.3 °F (36.8 °C)  Pulse:  [80-90] 80  Resp:  [16-18] 18  SpO2:  [92 %-95 %] 94 %  BP: (130-153)/(63-68) 142/66     Weight: 56.9 kg (125 lb 7.1 oz)  Body mass index is 20.25 kg/m².  No intake or output data in the 24 hours ending 12/29/19 1347   Physical Exam   Constitutional: She is oriented to person, place, and time. She appears well-developed and well-nourished. No distress.   HENT:   Head: Normocephalic and atraumatic.   Mouth/Throat: Oropharynx is clear and moist.   Eyes: Pupils are equal, round, and reactive to light. Conjunctivae and EOM are normal.   Neck: Neck supple. No JVD present. No thyromegaly present.   Cardiovascular: Normal rate and regular rhythm. Exam reveals no gallop and no friction rub.   No murmur heard.  Pulmonary/Chest: Effort  normal. She has no wheezes. She has no rales.   Coarse crackles right upper lobe.  Coarse cough.   Abdominal: Soft. Bowel sounds are normal. She exhibits no distension. There is no tenderness. There is no rebound and no guarding.   Musculoskeletal: Normal range of motion. She exhibits no edema or deformity.   Lymphadenopathy:     She has no cervical adenopathy.   Neurological: She is alert and oriented to person, place, and time. She has normal reflexes.   Skin: Skin is warm and dry. No rash noted.   Psychiatric: She has a normal mood and affect. Her behavior is normal. Judgment and thought content normal.   Nursing note and vitals reviewed.      Significant Labs: All pertinent labs within the past 24 hours have been reviewed.    Significant Imaging: I have reviewed all pertinent imaging results/findings within the past 24 hours.      Assessment/Plan:      * Pneumonia of right middle lobe due to infectious organism  Coarse cough with weakness and fatigue.  -IV Vanco and Zosyn initially.  Changed to Ceftriaxone and Azithromycin.  -CT chest showed RML pneumonia  -pulse ox q 4 hours    Urinary tract infection without hematuria  IV abx  -urine culture pending  No urinary symptoms.    Compression fracture of T12 vertebra  -currently bedbound      Other constipation  No Bm in 2 weeks  -KUB showed large amt stool in colon and rectum  -Brown bomb enema  -Miralax BID        Hearing loss  Has hearing Aids        VTE Risk Mitigation (From admission, onward)    None                Gabriel Oliveira MD  Department of Hospital Medicine   [facility]

## 2019-12-29 NOTE — PLAN OF CARE
MBSS completed with no aspiration and recommendation for a Mechanical Soft Consistency diet and Thin liquids with basic swallowing precautions. S.T to continue to follow..

## 2019-12-29 NOTE — PLAN OF CARE
Patient has been sleeping most of the day. When she is awake, she is alert and disoriented to place, situation and time. She is on 4 liter of oxygen via nasal cannula and she is NSR on tele monitor. She is incontinent to bladder and bowel. She is bed bound with a vertebral fracture and compressed disc x2.

## 2019-12-29 NOTE — PROGRESS NOTES
Clinical Pharmacy: Vancomycin Sign Off Note    Vancomycin consult discontinued by provider.  Pharmacy will sign off, please re-consult as needed.     Thank you for allowing us to participate in this patient's care.   Vanna Mckeon, PharmD 12/29/2019 10:09 AM

## 2019-12-29 NOTE — NURSING
Unable to review POC w/ pt. AOx1; unable to verbalize understanding/needs &/or follow commands. Speech clear/coherent. Swallow study complete; [facility] soft, cardiac diet ordered. No SOB noted. Frequent productive cough. Bilateral PIVs dc'ed, neither functional. New PIV in R-upper arm; infusing abx. Tolerating meds well. Moderate generalized weakness; unable to reposition self in bed w/out assistance; educated on importance of changing position often. At this time, pt denies pain/nvd. NSR on telemonitor. Remains in stable condition at this time. NADN. Hourly rounding complete. Free of falls.  All safety measures in place.

## 2019-12-29 NOTE — ASSESSMENT & PLAN NOTE
Coarse cough with weakness and fatigue.  -IV Vanco and Zosyn initially.  Changed to Ceftriaxone and Azithromycin.  -CT chest showed RML pneumonia  -pulse ox q 4 hours

## 2019-12-29 NOTE — SUBJECTIVE & OBJECTIVE
Interval History:  Endorses weakness and fatigue.  Actively coughing.  No respiratory distress.  No acute problems overnight.  Appetite returning.  MBSS today.    Review of Systems   Constitutional: Positive for fatigue. Negative for chills and fever.   HENT: Negative for congestion and sore throat.    Eyes: Negative for visual disturbance.   Respiratory: Positive for cough and shortness of breath. Negative for wheezing.    Cardiovascular: Negative for chest pain, palpitations and leg swelling.   Gastrointestinal: Negative for abdominal pain, blood in stool, constipation, diarrhea, nausea and vomiting.   Genitourinary: Negative for dysuria and hematuria.   Musculoskeletal: Negative for arthralgias and back pain.   Skin: Negative for rash and wound.   Neurological: Positive for weakness. Negative for dizziness, light-headedness and numbness.   Hematological: Negative for adenopathy.     Objective:     Vital Signs (Most Recent):  Temp: 98.3 °F (36.8 °C) (12/29/19 1109)  Pulse: 80 (12/29/19 1109)  Resp: 18 (12/29/19 1109)  BP: (!) 142/66 (12/29/19 1109)  SpO2: (!) 94 % (12/29/19 1109) Vital Signs (24h Range):  Temp:  [97.9 °F (36.6 °C)-98.8 °F (37.1 °C)] 98.3 °F (36.8 °C)  Pulse:  [80-90] 80  Resp:  [16-18] 18  SpO2:  [92 %-95 %] 94 %  BP: (130-153)/(63-68) 142/66     Weight: 56.9 kg (125 lb 7.1 oz)  Body mass index is 20.25 kg/m².  No intake or output data in the 24 hours ending 12/29/19 1347   Physical Exam   Constitutional: She is oriented to person, place, and time. She appears well-developed and well-nourished. No distress.   HENT:   Head: Normocephalic and atraumatic.   Mouth/Throat: Oropharynx is clear and moist.   Eyes: Pupils are equal, round, and reactive to light. Conjunctivae and EOM are normal.   Neck: Neck supple. No JVD present. No thyromegaly present.   Cardiovascular: Normal rate and regular rhythm. Exam reveals no gallop and no friction rub.   No murmur heard.  Pulmonary/Chest: Effort normal. She has  no wheezes. She has no rales.   Coarse crackles right upper lobe.  Coarse cough.   Abdominal: Soft. Bowel sounds are normal. She exhibits no distension. There is no tenderness. There is no rebound and no guarding.   Musculoskeletal: Normal range of motion. She exhibits no edema or deformity.   Lymphadenopathy:     She has no cervical adenopathy.   Neurological: She is alert and oriented to person, place, and time. She has normal reflexes.   Skin: Skin is warm and dry. No rash noted.   Psychiatric: She has a normal mood and affect. Her behavior is normal. Judgment and thought content normal.   Nursing note and vitals reviewed.      Significant Labs: All pertinent labs within the past 24 hours have been reviewed.    Significant Imaging: I have reviewed all pertinent imaging results/findings within the past 24 hours.

## 2019-12-29 NOTE — HOSPITAL COURSE
Admitted for evaluation and treatment of weakness and cough suspicious for pneumonia.  Empirically started Vancomycin and Zosyn then changed to Ceftriaxone and Azithromycin.  CT of the chest with a RML infiltrate and productive cough.  SLP evaluation with MBSS.  No aspiration and recommend mechanical soft with thin liquids.  Palliative care to assess and discuss long term care goals with family.  Steady improvement back to baseline.  Mobility limited by vertebral compression fracture.  The family wishes to return home on a status of hospice with assistance.  Discharge plan to return home and complete a course of Augmentin and doxycycline.  Follow-up with primary care as needed.

## 2019-12-30 PROBLEM — Z51.5 ENCOUNTER FOR PALLIATIVE CARE: Status: ACTIVE | Noted: 2019-12-30

## 2019-12-30 LAB
ANION GAP SERPL CALC-SCNC: 9 MMOL/L (ref 8–16)
BASOPHILS # BLD AUTO: 0.09 K/UL (ref 0–0.2)
BASOPHILS NFR BLD: 0.5 % (ref 0–1.9)
BUN SERPL-MCNC: 14 MG/DL (ref 10–30)
CALCIUM SERPL-MCNC: 9.5 MG/DL (ref 8.7–10.5)
CHLORIDE SERPL-SCNC: 94 MMOL/L (ref 95–110)
CO2 SERPL-SCNC: 36 MMOL/L (ref 23–29)
CREAT SERPL-MCNC: 0.7 MG/DL (ref 0.5–1.4)
DIFFERENTIAL METHOD: ABNORMAL
EOSINOPHIL # BLD AUTO: 0.9 K/UL (ref 0–0.5)
EOSINOPHIL NFR BLD: 5.4 % (ref 0–8)
ERYTHROCYTE [DISTWIDTH] IN BLOOD BY AUTOMATED COUNT: 13.4 % (ref 11.5–14.5)
EST. GFR  (AFRICAN AMERICAN): >60 ML/MIN/1.73 M^2
EST. GFR  (NON AFRICAN AMERICAN): >60 ML/MIN/1.73 M^2
GLUCOSE SERPL-MCNC: 98 MG/DL (ref 70–110)
HCT VFR BLD AUTO: 41.6 % (ref 37–48.5)
HGB BLD-MCNC: 12.9 G/DL (ref 12–16)
IMM GRANULOCYTES # BLD AUTO: 0.15 K/UL (ref 0–0.04)
IMM GRANULOCYTES NFR BLD AUTO: 0.9 % (ref 0–0.5)
LYMPHOCYTES # BLD AUTO: 1.3 K/UL (ref 1–4.8)
LYMPHOCYTES NFR BLD: 8 % (ref 18–48)
MCH RBC QN AUTO: 32.4 PG (ref 27–31)
MCHC RBC AUTO-ENTMCNC: 31 G/DL (ref 32–36)
MCV RBC AUTO: 105 FL (ref 82–98)
MONOCYTES # BLD AUTO: 1.2 K/UL (ref 0.3–1)
MONOCYTES NFR BLD: 7 % (ref 4–15)
NEUTROPHILS # BLD AUTO: 13.2 K/UL (ref 1.8–7.7)
NEUTROPHILS NFR BLD: 78.2 % (ref 38–73)
NRBC BLD-RTO: 0 /100 WBC
PLATELET # BLD AUTO: 609 K/UL (ref 150–350)
PMV BLD AUTO: 9.2 FL (ref 9.2–12.9)
POTASSIUM SERPL-SCNC: 3.8 MMOL/L (ref 3.5–5.1)
RBC # BLD AUTO: 3.98 M/UL (ref 4–5.4)
SODIUM SERPL-SCNC: 139 MMOL/L (ref 136–145)
WBC # BLD AUTO: 16.81 K/UL (ref 3.9–12.7)

## 2019-12-30 PROCEDURE — 85025 COMPLETE CBC W/AUTO DIFF WBC: CPT

## 2019-12-30 PROCEDURE — 99223 1ST HOSP IP/OBS HIGH 75: CPT | Mod: ,,, | Performed by: PHYSICIAN ASSISTANT

## 2019-12-30 PROCEDURE — 27000221 HC OXYGEN, UP TO 24 HOURS

## 2019-12-30 PROCEDURE — 80048 BASIC METABOLIC PNL TOTAL CA: CPT

## 2019-12-30 PROCEDURE — 25000003 PHARM REV CODE 250: Performed by: NURSE PRACTITIONER

## 2019-12-30 PROCEDURE — 36415 COLL VENOUS BLD VENIPUNCTURE: CPT

## 2019-12-30 PROCEDURE — 21400001 HC TELEMETRY ROOM

## 2019-12-30 PROCEDURE — 94761 N-INVAS EAR/PLS OXIMETRY MLT: CPT

## 2019-12-30 PROCEDURE — 92526 ORAL FUNCTION THERAPY: CPT

## 2019-12-30 PROCEDURE — 99223 PR INITIAL HOSPITAL CARE,LEVL III: ICD-10-PCS | Mod: ,,, | Performed by: PHYSICIAN ASSISTANT

## 2019-12-30 PROCEDURE — 63600175 PHARM REV CODE 636 W HCPCS: Performed by: INTERNAL MEDICINE

## 2019-12-30 RX ADMIN — ESCITALOPRAM OXALATE 20 MG: 10 TABLET ORAL at 09:12

## 2019-12-30 RX ADMIN — POLYETHYLENE GLYCOL (3350) 17 G: 17 POWDER, FOR SOLUTION ORAL at 09:12

## 2019-12-30 RX ADMIN — ASPIRIN 81 MG: 81 TABLET, COATED ORAL at 09:12

## 2019-12-30 RX ADMIN — AZITHROMYCIN MONOHYDRATE 500 MG: 500 INJECTION, POWDER, LYOPHILIZED, FOR SOLUTION INTRAVENOUS at 01:12

## 2019-12-30 RX ADMIN — TRAMADOL HYDROCHLORIDE 50 MG: 50 TABLET, FILM COATED ORAL at 04:12

## 2019-12-30 RX ADMIN — CEFTRIAXONE 1 G: 1 INJECTION, SOLUTION INTRAVENOUS at 12:12

## 2019-12-30 NOTE — PLAN OF CARE
Pt is awake and oriented. Vital Signs stable. No acute events noted. Care plan reviewed and pt verbalized understanding. Call light is in reach. Will continue to monitor. PIV CDI. Pt resting comfortably in bed with no acute events overnight. No evidence of learning. Will continue to monitor.

## 2019-12-30 NOTE — PLAN OF CARE
[facility] -                  Advance Care Planning Meeting    Patient Name: Ashley Koenig  MRN: [id number]  Palliative Care Provider:   Primary Care Physician: Maggy Ngo MD  Principal Problem:Pneumonia of right middle lobe due to infectious organism    Present during Meeting: son and daughter in law.      Primary Language:English   Needed: no    Advance Care Planning   In light of the patients serious illness, I engaged the family in the advance care planning process. Per sonBrenton (Mook), he and his wife are patient's caregivers. He reports he does have HCPOA, but it is not on file in the chart. Family would like patient to discharge with hospice. They have already met with [facility] liaison, and are waiting to see if patient will be accepted. If she is not accepted, they will plan to d/c with home health until hospice can accept her.    Family has decided their preferences for car best align with comfort-focused treatment and have completed a LaPOST reflecting DNR status. I reviewed the LaPOST for accuracy and returned it to the family after being scanned into Epic.  Also spoke with Susan at [employer] who will keep palliative team updated on d/c plan.        Rocael Malloy, MSW, LCSW  [phone]  Palliative Care

## 2019-12-30 NOTE — PROGRESS NOTES
She is in hospital now.   If she is discharged home, we can see if they want the open MRI.  Thanks. I will let you know.

## 2019-12-30 NOTE — PT/OT/SLP PROGRESS
Speech Language Pathology Treatment    Patient Name:  Ashley Koenig   MRN:  [id number]  Admitting Diagnosis: Pneumonia of right middle lobe due to infectious organism    Recommendations:                 General Recommendations:  Dysphagia therapy  Diet recommendations:  Mechanical soft, Liquid Diet Level: Thin   Aspiration Precautions: 1 bite/sip at a time, Alternating bites/sips, Double swallow with each bite/sip, HOB to 90 degrees and Small bites/sips   General Precautions: Standard, aspiration  Communication strategies:  none    Subjective     Pt [place] but cooperative. She requires close proximity to see faces.  Patient goals: none stated     Pain/Comfort:  · Pain Rating 1: 0/10  · Pain Rating Post-Intervention 1: 0/10    Objective:     Has the patient been evaluated by SLP for swallowing?   Yes  Keep patient NPO? No   Current Respiratory Status:        Pt completed oral motor and tongue base retraction ex x 20 each with mod cues. She tolerated sips of thin liquids via straw with cough on 1/4 trials. Recommend re-assessment tomorrow.     Assessment:     Ashley Koenig is a 89 y.o. female with an SLP diagnosis of Dysphagia.  She presents with impaired swallow and risk of aspiration.    Goals:   Multidisciplinary Problems     SLP Goals        Problem: SLP Goal    Goal Priority Disciplines Outcome   SLP Goal     SLP Ongoing, Progressing   Description:  LTG:  Pt will tolerate least restrictive diet consistency safely and efficiently.    ST. MBSS to further assess swallowing- MBSS completed 19 with no aspiration and recommendation for a Soft Mechanical Consistency diet and thin liquids with basic swallowing precaution. Results and Recs reviewed with pt and her family.              2. Laryngeal and Pharyngeal exercises x20 to improve swallowing.               3. Oral Motor tasks x20  Reassess goal by Bennett 3, 2019                     Plan:     · Patient to be seen:  3 x/week   · Plan of Care expires:     · Plan of  Care reviewed with:  patient   · SLP Follow-Up:  Yes       Discharge recommendations:      Barriers to Discharge:  None    Time Tracking:     SLP Treatment Date:   12/30/19  Speech Start Time:  0855  Speech Stop Time:  0914     Speech Total Time (min):  19 min    Billable Minutes: Treatment Swallowing Dysfunction 19    Carlita Priest CCC-SLP  12/30/2019

## 2019-12-30 NOTE — PLAN OF CARE
Patient had no significant events during shift. Continued IV antibiotics as ordered. Patient ate well during shift with just tray assistance. Continued frequent turning, no breakdown noted during shift. Patient had one complaint of back pain and asked for medication. Gave pain medication as ordered.

## 2019-12-30 NOTE — CONSULTS
Advance Care Planning    Consult Note  Palliative Care      Consult Requested By: Gabriel Oliveira MD  Reason for Consult: Goals of care/ hospice education and referral        SUBJECTIVE:     History of Present Illness:  Ashley Koenig is a 89 y.o. year old with a history of dementia, CHF (compensated with EF 60%), COPD, HTN, and recent atraumatic compression fracture who presented to the emergency department due to cough, lethargy, and worsening mental status. She was admitted for further workup of RML pneumonia and UTI. Palliative Care was consulted to assist with goals of care discussion. I met the patient's son Brenton and his wife Samantha in a consultation room. Brenton explains the precipitous decline over the last 3 weeks. He reports his mother did not fall but rather was on a trip to [place] when she began to complain of back pain. They cut the trip short and she continued to complain of back pain which prompted additional evaluation. Imaging revealed a compression fracture and conservative management ensued. She has seen a pain provider and prescribed Tramadol and was supposed to be fit for a brace. Her family reports that she also developed a UTI around the same time and she was prescribed antibiotics. In the last 3 weeks they report she is no longer mobile and semi-independent with ADL but rather bedbound due to pain and profound confusion with hallucinations. Samantha says they just recently started giving her Tramadol but her mentation was already altered and this did not worsen her status. Her family was open to hearing about hospice and their options at discharge as the patient has been telling them that she is tired and accepting of her death as she feels she has a poor quality of life. We discussed the philosophy of hospice, providing supportive care services to maximize quality of life and comfort care at the end of life. I also discussed the services available with hospice including but not limited to:  CNA visit up to an hour a day usually every other day, RN visit usually every other day, MD to oversee care, 24hr phone number to call with questions or concerns, comfort medications, DME, access to GIP unit for acute symptom management, access to respite care, , SW, volunteer program, and bereavement support. Brenton and Samantha both agree that this would be in line with her wishes as well as theirs. They would like to redirect care from life prolonging to comfort based and enroll with hospice at discharge. At this time she does not have a qualifying hospice diagnosis but we discussed returning home with the aid of home health, completing a course of antibiotics, and staying in touch with [facility] so they can enroll when she does meet criteria. Both Brenton and Samantha agreed that she would want to be a DNR/DNI and no longer wishes to come back to the hospital. We discussed the benefits of a LaPOST and they were agreeable to completing this today. Brenton explains that he is HCPOA but we do not have a copy on file. He has 3 sisters who live in [place] but defer to him for all decisions. I met Ms. Koenig in her room without family present. She is very hard of hearing and alert but confused. She is not a reliable historian and appears comfortable.      Past Medical History:   Diagnosis Date    Acute congestive heart failure 3/6/2019    Age-related macular degeneration, wet, both eyes     Anginal equivalent     Anxiety     Arthritis     BPPV (benign paroxysmal positional vertigo)     Brain tumor     Clotting disorder     lung    COPD (chronic obstructive pulmonary disease)     Depression     Diastolic dysfunction     Hearing loss     Heart disease     Hyperlipidemia     Hypertension     Hypoxemia     Lung nodule     Meningioma     Multiple thyroid nodules     Pulmonary embolism     Pulmonary hypertension     Sepsis due to pneumonia     Sleep apnea in adult     Stroke     per eye exam,  never been treated for    Urinary incontinence     Vitamin D deficiency      Past Surgical History:   Procedure Laterality Date    CATARACT EXTRACTION Bilateral     TOTAL HIP ARTHROPLASTY Right 08/20/2016    aafter fx     Family History   Problem Relation Age of Onset    Alzheimer's disease Father     Alzheimer's disease Daughter     No Known Problems Son      Review of patient's allergies indicates:  No Known Allergies    Medications:    Current Facility-Administered Medications:     aspirin EC tablet 81 mg, 81 mg, Oral, Daily, Jill P. Stacy, NP, 81 mg at 12/30/19 0958    azithromycin 500 mg in dextrose 5 % 250 mL IVPB (ready to mix system), 500 mg, Intravenous, Q24H, Gabriel Oliveira MD, Last Rate: 250 mL/hr at 12/29/19 1210, 500 mg at 12/29/19 1210    cefTRIAXone (ROCEPHIN) 1 g in dextrose 5 % 50 mL IVPB, 1 g, Intravenous, Q24H, Gabriel Oliveira MD, 1 g at 12/29/19 1613    escitalopram oxalate tablet 20 mg, 20 mg, Oral, Daily, Jill P. Stacy, NP, 20 mg at 12/30/19 0958    polyethylene glycol packet 17 g, 17 g, Oral, BID, Jill P. Stacy, NP, 17 g at 12/30/19 0958    traMADol tablet 50 mg, 50 mg, Oral, Q8H PRN, Jill P. Stacy, NP, 50 mg at 12/29/19 1112        OBJECTIVE:   Symptom Assessment (ESAS 0-10 scale) unable to obtain, poor historian    ESAS 0 1 2 3 4 5 6 7 8 9 10   Pain              Dyspnea              Anxiety              Nausea              Depression               Anorexia              Fatigue              Insomnia              Restlessness               Agitation              Constipation    no  Bowel Management Plan (BMP): yes  Diarrhea        no  Comments:      Performance Status: PPS Score 30      ROS:  Review of Systems   Unable to perform ROS: Dementia       Physical Exam:  Vitals: Temp: 98.6 °F (37 °C) (12/30/19 0756)  Pulse: 94 (12/30/19 0845)  Resp: 18 (12/30/19 0845)  BP: (!) 145/67 (12/30/19 0756)  SpO2: (!) 92 % (12/30/19 0845)    Gen: well-developed,  well-nourished, NAD, pleasantly confused  Head: atraumatic, normocephalic  Eyes: conjuctiva and sclera clear  Ears: very hard of hearing  Mouth: no mucositis, good dentition  Respiratory: CTAB, diminished in bases  Heart: RRR  Abdomen: soft, NTTP, nondistended, normoactive BS  Pulses: 2+ in DP  Extremities: no edema  Neurologic: no focal deficits, follows commands  Skin: no rashes or lesions, , hyperpigmentation distal BLE  Psych: cooperative, globally confused     Labs:  CBC:   WBC   Date Value Ref Range Status   12/30/2019 16.81 (H) 3.90 - 12.70 K/uL Final     Hemoglobin   Date Value Ref Range Status   12/30/2019 12.9 12.0 - 16.0 g/dL Final     Hematocrit   Date Value Ref Range Status   12/30/2019 41.6 37.0 - 48.5 % Final     Mean Corpuscular Volume   Date Value Ref Range Status   12/30/2019 105 (H) 82 - 98 fL Final     Platelets   Date Value Ref Range Status   12/30/2019 609 (H) 150 - 350 K/uL Final       BMP  Lab Results   Component Value Date     12/30/2019    K 3.8 12/30/2019    CL 94 (L) 12/30/2019    CO2 36 (H) 12/30/2019    BUN 14 12/30/2019    CREATININE 0.7 12/30/2019    CALCIUM 9.5 12/30/2019    ANIONGAP 9 12/30/2019    ESTGFRAFRICA >60 12/30/2019    EGFRNONAA >60 12/30/2019       LFT:   Lab Results   Component Value Date    AST 20 12/27/2019    ALKPHOS 155 (H) 12/27/2019    BILITOT 0.3 12/27/2019       Albumin:   Albumin   Date Value Ref Range Status   12/27/2019 2.8 (L) 3.5 - 5.2 g/dL Final         Radiology:I have reviewed all pertinent imaging results/findings within the past 24 hours.      ASSESSMENT   Ashley Koenig is a 89 y.o. year old with a history of dementia, CHF (compensated with EF 60%), COPD, HTN, and recent atraumatic compression fracture who presented to the emergency department due to cough, lethargy, and worsening mental status. She was admitted for further workup of RML pneumonia and UTI. Palliative Care was consulted to assist with goals of care discussion.    PLAN   1.  Encounter for Palliative Care  - Code status: DNR/ DNI  - Surrogate: son Brenton, requested copy of advance directive  - Primary outcome of meeting is to redirect care from life prolonging to comfort based and enroll with hospice at discharge.   - At this time she does not have a qualifying hospice diagnosis but we discussed returning home with the aid of home health, completing the course of antibiotics, and staying in touch with [facility] so they can enroll when she does meet criteria.  - [facility] has already been consulted  - MelodyOST completed    2. Compression fracture, atraumatic  - Continue tramadol but would recommend decreasing to 25mg q6hr PRN pain  - If ineffective I recommend hydrocodone 5mg PO q6hr PRN pain  - A brace is likely not necessary as she is bedbound a this time, if she get back on her feet then a brace might provide comfort.    3. Dementia and frailty  - Baseline FAST 6d, currently worse than baseline related to infection and likely hospital delirium  - Her daughter in law provides 24hr care at home      Thank you for allowing Palliative care to be involved in the care of Ashley Koenig.         Medical decision making: HIGH based on high risk of death, untreated symptoms (pain), high risk medications, managing side effects of medications or polypharmacy.     Plan required increased review of medication choice, interaction, dosing, frequency, and route due to patient complexity. Patient complexity increased by: At risk for delirium, Presence of advanced age, Presence of altered mentation    > 50% of 70 min visit spent in chart review, face to face discussion of goals of care, symptom assessment, coordination of care and emotional support, formulating and communicating goals of care, exploring burden/ benefit of various approaches of treatment, and hospice education.       Lilibeth Bernstein PA-C  Palliative Care

## 2019-12-31 VITALS
HEIGHT: 66 IN | WEIGHT: 133.19 LBS | TEMPERATURE: 98 F | RESPIRATION RATE: 18 BRPM | OXYGEN SATURATION: 98 % | BODY MASS INDEX: 21.4 KG/M2 | SYSTOLIC BLOOD PRESSURE: 144 MMHG | DIASTOLIC BLOOD PRESSURE: 66 MMHG | HEART RATE: 83 BPM

## 2019-12-31 LAB
ANION GAP SERPL CALC-SCNC: 9 MMOL/L (ref 8–16)
BASOPHILS # BLD AUTO: 0.09 K/UL (ref 0–0.2)
BASOPHILS NFR BLD: 0.6 % (ref 0–1.9)
BUN SERPL-MCNC: 11 MG/DL (ref 10–30)
CALCIUM SERPL-MCNC: 9.8 MG/DL (ref 8.7–10.5)
CHLORIDE SERPL-SCNC: 95 MMOL/L (ref 95–110)
CO2 SERPL-SCNC: 33 MMOL/L (ref 23–29)
CREAT SERPL-MCNC: 0.6 MG/DL (ref 0.5–1.4)
DIFFERENTIAL METHOD: ABNORMAL
EOSINOPHIL # BLD AUTO: 0.7 K/UL (ref 0–0.5)
EOSINOPHIL NFR BLD: 4.4 % (ref 0–8)
ERYTHROCYTE [DISTWIDTH] IN BLOOD BY AUTOMATED COUNT: 13.4 % (ref 11.5–14.5)
EST. GFR  (AFRICAN AMERICAN): >60 ML/MIN/1.73 M^2
EST. GFR  (NON AFRICAN AMERICAN): >60 ML/MIN/1.73 M^2
GLUCOSE SERPL-MCNC: 100 MG/DL (ref 70–110)
HCT VFR BLD AUTO: 43.1 % (ref 37–48.5)
HGB BLD-MCNC: 13.5 G/DL (ref 12–16)
IMM GRANULOCYTES # BLD AUTO: 0.18 K/UL (ref 0–0.04)
IMM GRANULOCYTES NFR BLD AUTO: 1.1 % (ref 0–0.5)
LYMPHOCYTES # BLD AUTO: 1.8 K/UL (ref 1–4.8)
LYMPHOCYTES NFR BLD: 11.1 % (ref 18–48)
MCH RBC QN AUTO: 32.3 PG (ref 27–31)
MCHC RBC AUTO-ENTMCNC: 31.3 G/DL (ref 32–36)
MCV RBC AUTO: 103 FL (ref 82–98)
MONOCYTES # BLD AUTO: 1.1 K/UL (ref 0.3–1)
MONOCYTES NFR BLD: 7.1 % (ref 4–15)
NEUTROPHILS # BLD AUTO: 11.9 K/UL (ref 1.8–7.7)
NEUTROPHILS NFR BLD: 75.7 % (ref 38–73)
NRBC BLD-RTO: 0 /100 WBC
PLATELET # BLD AUTO: 704 K/UL (ref 150–350)
PMV BLD AUTO: 8.8 FL (ref 9.2–12.9)
POTASSIUM SERPL-SCNC: 4.3 MMOL/L (ref 3.5–5.1)
RBC # BLD AUTO: 4.18 M/UL (ref 4–5.4)
SODIUM SERPL-SCNC: 137 MMOL/L (ref 136–145)
WBC # BLD AUTO: 15.72 K/UL (ref 3.9–12.7)

## 2019-12-31 PROCEDURE — 85025 COMPLETE CBC W/AUTO DIFF WBC: CPT

## 2019-12-31 PROCEDURE — 25000003 PHARM REV CODE 250: Performed by: NURSE PRACTITIONER

## 2019-12-31 PROCEDURE — 99231 SBSQ HOSP IP/OBS SF/LOW 25: CPT | Mod: ,,, | Performed by: PHYSICIAN ASSISTANT

## 2019-12-31 PROCEDURE — 80048 BASIC METABOLIC PNL TOTAL CA: CPT

## 2019-12-31 PROCEDURE — 99231 PR SUBSEQUENT HOSPITAL CARE,LEVL I: ICD-10-PCS | Mod: ,,, | Performed by: PHYSICIAN ASSISTANT

## 2019-12-31 PROCEDURE — 36415 COLL VENOUS BLD VENIPUNCTURE: CPT

## 2019-12-31 RX ORDER — AMOXICILLIN AND CLAVULANATE POTASSIUM 500; 125 MG/1; MG/1
1 TABLET, FILM COATED ORAL 2 TIMES DAILY
Qty: 10 TABLET | Refills: 0 | Status: SHIPPED | OUTPATIENT
Start: 2020-01-01 | End: 2020-01-06

## 2019-12-31 RX ORDER — DOXYCYCLINE 100 MG/1
100 CAPSULE ORAL EVERY 12 HOURS
Qty: 10 CAPSULE | Refills: 0 | Status: SHIPPED | OUTPATIENT
Start: 2020-01-01 | End: 2020-01-06

## 2019-12-31 RX ADMIN — ASPIRIN 81 MG: 81 TABLET, COATED ORAL at 09:12

## 2019-12-31 RX ADMIN — ESCITALOPRAM OXALATE 20 MG: 10 TABLET ORAL at 09:12

## 2019-12-31 NOTE — PLAN OF CARE
12/31/19 1702   Final Note   Assessment Type Final Discharge Note   Anticipated Discharge Disposition HospiceHome   Right Care Referral Info   Post Acute Recommendation Other   Referral Type hospice   Facility Name [facility]

## 2019-12-31 NOTE — PLAN OF CARE
Pt is awake and oriented. Vital Signs stable. No acute events noted. Care plan reviewed and pt verbalized understanding. Call light is in reach. Will continue to monitor. PIV CDI. Pt rested comfortably overnight. Will continue to monitor.

## 2019-12-31 NOTE — PROGRESS NOTES
Brief Palliative Care note    Patient seen and examined without family present. Her mental status was unchanged from yesterdays exam. Family has completed LaPOST which is on file and have met with [facility]. I received a call yesterday indicating that hospice would be able to admit at discharge. Primary team and CM have been notified.    I will continue to follow peripherally at this time.     ROS- unable to obtain due to mental status (dementia)    PE  Well developed, NAD  RRR  BSCTA, diminished in bases  Very hard of hearing  Global confusion, oriented to person only

## 2019-12-31 NOTE — SUBJECTIVE & OBJECTIVE
Interval History:  Endorses weakness and fatigue.  Cough improved.  No respiratory distress.  No acute problems overnight.  Appetite returning.    Review of Systems   Constitutional: Positive for fatigue. Negative for chills and fever.   HENT: Negative for congestion and sore throat.    Eyes: Negative for visual disturbance.   Respiratory: Positive for cough and shortness of breath. Negative for wheezing.    Cardiovascular: Negative for chest pain, palpitations and leg swelling.   Gastrointestinal: Negative for abdominal pain, blood in stool, constipation, diarrhea, nausea and vomiting.   Genitourinary: Negative for dysuria and hematuria.   Musculoskeletal: Negative for arthralgias and back pain.   Skin: Negative for rash and wound.   Neurological: Positive for weakness. Negative for dizziness, light-headedness and numbness.   Hematological: Negative for adenopathy.     Objective:     Vital Signs (Most Recent):  Temp: 98.6 °F (37 °C) (12/30/19 1910)  Pulse: 86 (12/30/19 2112)  Resp: 16 (12/30/19 1910)  BP: (!) 146/69 (12/30/19 1910)  SpO2: (!) 93 % (12/30/19 1910) Vital Signs (24h Range):  Temp:  [97.8 °F (36.6 °C)-98.7 °F (37.1 °C)] 98.6 °F (37 °C)  Pulse:  [] 86  Resp:  [16-18] 16  SpO2:  [92 %-97 %] 93 %  BP: (132-168)/(60-69) 146/69     Weight: 61.5 kg (135 lb 9.3 oz)  Body mass index is 21.88 kg/m².    Intake/Output Summary (Last 24 hours) at 12/30/2019 2238  Last data filed at 12/30/2019 1800  Gross per 24 hour   Intake 1106 ml   Output --   Net 1106 ml      Physical Exam   Constitutional: She is oriented to person, place, and time. She appears well-developed and well-nourished. No distress.   HENT:   Head: Normocephalic and atraumatic.   Mouth/Throat: Oropharynx is clear and moist.   Eyes: Pupils are equal, round, and reactive to light. Conjunctivae and EOM are normal.   Neck: Neck supple. No JVD present. No thyromegaly present.   Cardiovascular: Normal rate and regular rhythm. Exam reveals no gallop  and no friction rub.   No murmur heard.  Pulmonary/Chest: Effort normal. She has no wheezes. She has no rales.   Coarse crackles right upper lobe.  Coarse cough.   Abdominal: Soft. Bowel sounds are normal. She exhibits no distension. There is no tenderness. There is no rebound and no guarding.   Musculoskeletal: Normal range of motion. She exhibits no edema or deformity.   Lymphadenopathy:     She has no cervical adenopathy.   Neurological: She is alert and oriented to person, place, and time. She has normal reflexes.   Skin: Skin is warm and dry. No rash noted.   Psychiatric: She has a normal mood and affect. Her behavior is normal. Judgment and thought content normal.   Nursing note and vitals reviewed.      Significant Labs: All pertinent labs within the past 24 hours have been reviewed.    Significant Imaging: I have reviewed all pertinent imaging results/findings within the past 24 hours.

## 2019-12-31 NOTE — PLAN OF CARE
12/31/19 1300   Transport Information   Transport Diagnosis hospice   Transportation Date 12/31/19   Transported From [facility]   Transported To home   Supporting Clinical Information   Unable to sit or travel in a seated position because Postural Instability   Select all that would support previous selection Oxygen administration required;Severe Altered Mental Status;Frail, debilitated, or extreme muscle atrophy;Risk of falling out of wheelchair while in motion   Insurance carrier notified  yes   Authorization ID   ([employer] auth# [id number])

## 2019-12-31 NOTE — PROGRESS NOTES
[facility] Hospital Medicine  Progress Note    Patient Name: Ashley Koenig  MRN: [id number]  Patient Class: IP- Inpatient   Admission Date: 12/27/2019  Length of Stay: 3 days  Attending Physician: Gabriel Oliveira MD  Primary Care Provider: Maggy Ngo MD        Subjective:     Principal Problem:Pneumonia of right middle lobe due to infectious organism    HPI:  Ashley Koenig is a 89 y.o. female patient with a PMHx of CHF, COPD, HTN, HLD, and compression fracture who presented to the ER for  cough, onset several days PTA. EMS reports that the pt is on 2L of O2 at home, but had an O2 saturation of 88% at the scene. Associated sxs included generalized weakness, cough, constipation x 2 weeks, confusion x 2 days, bedbound status x 1 month. Patient denies any fever, chills, n/v/d, SOB, CP, numbness, dizziness, headache, and all other sxs at this time. Pt is currently on Doxycycline for her cough. Pt suffered a compression fracture following a fall 1 month ago, and is currently following with Pain Management for non-operative treatment.  Son, KIMBERLY, Brenton Koenig, is SDM [phone] and stated patient is DNR. In ER, CT chest showed RML pneumonia, compression Fx T12, cardiomegaly. KUB showed Large amount stool in colon and rectum. Patient is on 3.5 liters in ER and is unstable for discharge. Son states she has declined in 30 days. When asked, he stated he would like informational visit for Hospice. Social service consult for discharge planning. Observation for Pneumonia and UTI.     Overview/Hospital Course:  Admitted for evaluation and treatment of weakness and cough suspicious for pneumonia.  Empirically started Vancomycin and Zosyn then changed to Ceftriaxone and Azithromycin.  CT of the chest with a RML infiltrate and productive cough.  SLP evaluation with MBSS.  No aspiration and recommend mechanical soft with thin liquids.  Palliative care to assess and discuss long term care goals with  family.    Interval History:  Endorses weakness and fatigue.  Cough improved.  No respiratory distress.  No acute problems overnight.  Appetite returning.    Review of Systems   Constitutional: Positive for fatigue. Negative for chills and fever.   HENT: Negative for congestion and sore throat.    Eyes: Negative for visual disturbance.   Respiratory: Positive for cough and shortness of breath. Negative for wheezing.    Cardiovascular: Negative for chest pain, palpitations and leg swelling.   Gastrointestinal: Negative for abdominal pain, blood in stool, constipation, diarrhea, nausea and vomiting.   Genitourinary: Negative for dysuria and hematuria.   Musculoskeletal: Negative for arthralgias and back pain.   Skin: Negative for rash and wound.   Neurological: Positive for weakness. Negative for dizziness, light-headedness and numbness.   Hematological: Negative for adenopathy.     Objective:     Vital Signs (Most Recent):  Temp: 98.6 °F (37 °C) (12/30/19 1910)  Pulse: 86 (12/30/19 2112)  Resp: 16 (12/30/19 1910)  BP: (!) 146/69 (12/30/19 1910)  SpO2: (!) 93 % (12/30/19 1910) Vital Signs (24h Range):  Temp:  [97.8 °F (36.6 °C)-98.7 °F (37.1 °C)] 98.6 °F (37 °C)  Pulse:  [] 86  Resp:  [16-18] 16  SpO2:  [92 %-97 %] 93 %  BP: (132-168)/(60-69) 146/69     Weight: 61.5 kg (135 lb 9.3 oz)  Body mass index is 21.88 kg/m².    Intake/Output Summary (Last 24 hours) at 12/30/2019 2238  Last data filed at 12/30/2019 1800  Gross per 24 hour   Intake 1106 ml   Output --   Net 1106 ml      Physical Exam   Constitutional: She is oriented to person, place, and time. She appears well-developed and well-nourished. No distress.   HENT:   Head: Normocephalic and atraumatic.   Mouth/Throat: Oropharynx is clear and moist.   Eyes: Pupils are equal, round, and reactive to light. Conjunctivae and EOM are normal.   Neck: Neck supple. No JVD present. No thyromegaly present.   Cardiovascular: Normal rate and regular rhythm. Exam reveals  no gallop and no friction rub.   No murmur heard.  Pulmonary/Chest: Effort normal. She has no wheezes. She has no rales.   Coarse crackles right upper lobe.  Coarse cough.   Abdominal: Soft. Bowel sounds are normal. She exhibits no distension. There is no tenderness. There is no rebound and no guarding.   Musculoskeletal: Normal range of motion. She exhibits no edema or deformity.   Lymphadenopathy:     She has no cervical adenopathy.   Neurological: She is alert and oriented to person, place, and time. She has normal reflexes.   Skin: Skin is warm and dry. No rash noted.   Psychiatric: She has a normal mood and affect. Her behavior is normal. Judgment and thought content normal.   Nursing note and vitals reviewed.      Significant Labs: All pertinent labs within the past 24 hours have been reviewed.    Significant Imaging: I have reviewed all pertinent imaging results/findings within the past 24 hours.      Assessment/Plan:      * Pneumonia of right middle lobe due to infectious organism  Coarse cough with weakness and fatigue.  -IV Vanco and Zosyn initially.  Changed to Ceftriaxone and Azithromycin.  -CT chest showed RML pneumonia  -pulse ox q 4 hours    Urinary tract infection without hematuria  IV abx  -urine culture pending  No urinary symptoms.    Compression fracture of T12 vertebra  -currently bedbound      Other constipation  No Bm in 2 weeks  -KUB showed large amt stool in colon and rectum  -Brown bomb enema  -Miralax BID        Hearing loss  Has hearing Aids        VTE Risk Mitigation (From admission, onward)    None                Gabriel Oliveira MD  Department of Hospital Medicine   [facility]

## 2019-12-31 NOTE — PLAN OF CARE
Susan with [facility] here to sign patient up for home hospice.  Patient will be discharge home with hospice today.  Awaiting authorization from [employer] for ambulance transportation home.       12/31/19 1153   Post-Acute Status   Post-Acute Authorization Home Health/Hospice   Home Health/Hospice Status Set-up Complete

## 2019-12-31 NOTE — NURSING
Contacted by Susan castillo/ [facility] r/t sending pt home w/ care. Stated they were able to obtain a qualifying dx of COPD & that she had been attempting to contact family. Family informed of her attempts to contact them once they arrived. Son verbalized he had spoken to Susan & requested to speak to MD regarding next steps to get pt home. Dr. Oliveira notified & stated he would speak w/ them shortly. Family informed of his response. AOx1. NADN. All safety measures in place, bed alarm remains activated. Family at bedside.

## 2019-12-31 NOTE — PLAN OF CARE
Patient will need to be transported home via ambulance due to safety reasons. Patient's consents for [facility] obtained. Contacted Aracelis BEE Nurse at [facility], obtained insurance authorization for  ambulance transportation.  Auth #  [id number]. Update to patient's family who were leaving to go home and prepare their house for patient's homecoming.    Disposition: Home with [facility]    Transportation:  [facility] ( ordered )          12/31/19 1305   Post-Acute Status   Post-Acute Authorization Other   Other Status See Comments   Discharge Delays (!) Patient Transportation

## 2019-12-31 NOTE — PLAN OF CARE
Pt dc'ed to home w/ [facility]. Family remains @ bedside. All safety measures will continue; will continue to monitor until pt off unit.

## 2020-01-01 LAB
BACTERIA BLD CULT: NORMAL
BACTERIA BLD CULT: NORMAL

## 2020-01-01 NOTE — DISCHARGE SUMMARY
[facility] Hospital Medicine  Discharge Summary      Patient Name: Ashley Koenig  MRN: [id number]  Admission Date: 12/27/2019  Hospital Length of Stay: 4 days  Discharge Date and Time: 12/31/2019  3:21 PM  Attending Physician:  Gabriel Oliveira MD  Discharging Provider: Gabriel Oliveira MD  Primary Care Provider: Maggy Ngo MD      HPI:   Ashley Koenig is a 89 y.o. female patient with a PMHx of CHF, COPD, HTN, HLD, and compression fracture who presented to the ER for  cough, onset several days PTA. EMS reports that the pt is on 2L of O2 at home, but had an O2 saturation of 88% at the scene. Associated sxs included generalized weakness, cough, constipation x 2 weeks, confusion x 2 days, bedbound status x 1 month. Patient denies any fever, chills, n/v/d, SOB, CP, numbness, dizziness, headache, and all other sxs at this time. Pt is currently on Doxycycline for her cough. Pt suffered a compression fracture following a fall 1 month ago, and is currently following with Pain Management for non-operative treatment.  Son, KIMBERLY, Brenton Koenig, is SDM [phone] and stated patient is DNR. In ER, CT chest showed RML pneumonia, compression Fx T12, cardiomegaly. KUB showed Large amount stool in colon and rectum. Patient is on 3.5 liters in ER and is unstable for discharge. Son states she has declined in 30 days. When asked, he stated he would like informational visit for Hospice. Social service consult for discharge planning. Observation for Pneumonia and UTI.     * No surgery found *      Hospital Course:   Admitted for evaluation and treatment of weakness and cough suspicious for pneumonia.  Empirically started Vancomycin and Zosyn then changed to Ceftriaxone and Azithromycin.  CT of the chest with a RML infiltrate and productive cough.  SLP evaluation with MBSS.  No aspiration and recommend mechanical soft with thin liquids.  Palliative care to assess and discuss long term care goals with  family.  Steady improvement back to baseline.  Mobility limited by vertebral compression fracture.  The family wishes to return home on a status of hospice with assistance.  Discharge plan to return home and complete a course of Augmentin and doxycycline.  Follow-up with primary care as needed.     Consults:   Consults (From admission, onward)        Status Ordering Provider     Inpatient consult to Palliative Care  Once     Provider:  Rocael Malloy LCSW    Completed MALINA, OMAR P.     Inpatient consult to Social Work  Once     Provider:  (Not yet assigned)    Completed MALINA, OMAR P.     Inpatient consult to Social Work  Once     Provider:  (Not yet assigned)    Completed MALINA, OMAR P.          No new Assessment & Plan notes have been filed under this hospital service since the last note was generated.  Service: Hospital Medicine    Final Active Diagnoses:    Diagnosis Date Noted POA    PRINCIPAL PROBLEM:  Pneumonia of right middle lobe due to infectious organism [J18.1] 12/27/2019 Yes    Urinary tract infection without hematuria [N39.0] 12/27/2019 Yes    Encounter for palliative care [Z51.5] 12/30/2019 Not Applicable    Other constipation [K59.09] 12/27/2019 Yes    Compression fracture of T12 vertebra [S22.080A] 12/27/2019 Yes    RML pneumonia [J18.1] 12/27/2019 Yes    Dementia [F03.90] 01/10/2019 Yes     Chronic    Hearing loss [H91.90] 08/12/2018 Yes      Problems Resolved During this Admission:       Discharged Condition: fair    Disposition: Hospice/Home    Follow Up:  Follow-up Information     Maggy Ngo MD.    Specialty:  Family Medicine  Why:  As needed  Contact information:  [address] [facility] [address] [phone]                 Patient Instructions:      Diet Cardiac     Activity as tolerated       Significant Diagnostic Studies: Labs: All labs within the past 24 hours have been reviewed    Pending Diagnostic Studies:     None          Medications:  Reconciled Home Medications:      Medication List      START taking these medications    amoxicillin-clavulanate 500-125mg 500-125 mg Tab  Commonly known as:  AUGMENTIN  Take 1 tablet (500 mg total) by mouth 2 (two) times daily. for 5 days  Start taking on:  January 1, 2020     doxycycline 100 MG Cap  Commonly known as:  VIBRAMYCIN  Take 1 capsule (100 mg total) by mouth every 12 (twelve) hours. for 5 days  Start taking on:  January 1, 2020        CONTINUE taking these medications    aspirin 81 MG EC tablet  Commonly known as:  ECOTRIN  Take 1 tablet (81 mg total) by mouth once daily.     back brace Misc  1 Device by Misc.(Non-Drug; Combo Route) route daily as needed.     escitalopram oxalate 20 MG tablet  Commonly known as:  LEXAPRO  TAKE 1 TABLET ONCE DAILY     traMADol 50 mg tablet  Commonly known as:  ULTRAM  Take 1/2 to 1 tab PO q6 PRN pain.     VITAMINS AND MINERALS ORAL  Take by mouth once daily.        STOP taking these medications    cephALEXin 500 MG capsule  Commonly known as:  KEFLEX     Xanax 0.25 MG tablet  Generic drug:  ALPRAZolam            Indwelling Lines/Drains at time of discharge:   Lines/Drains/Airways     None                 Time spent on the discharge of patient: 30 minutes  Patient was seen and examined on the date of discharge and determined to be suitable for discharge.         Gabriel Oliveira MD  Department of Hospital Medicine  [facility]

## 2020-01-05 DIAGNOSIS — F41.1 GENERALIZED ANXIETY DISORDER: ICD-10-CM

## 2020-01-05 RX ORDER — ESCITALOPRAM OXALATE 20 MG/1
TABLET ORAL
Qty: 90 TABLET | Refills: 0 | Status: SHIPPED | OUTPATIENT
Start: 2020-01-05

## 2020-01-06 ENCOUNTER — TELEPHONE (OUTPATIENT)
Dept: PAIN MEDICINE | Facility: CLINIC | Age: 85
End: 2020-01-06

## 2020-01-06 NOTE — TELEPHONE ENCOUNTER
----- Message from Mary Anne Hagen sent at 1/6/2020 11:02 AM CST -----  Type:  Needs Medical Advice    Who Called:  Willian with    Symptoms (please be specific):   Regarding a back brace   How long has patient had these symptoms:     Pharmacy name and phone #:     Would the patient rather a call back or a response via [facility]?   Call back  Best Call Back Number:    [phone]  Additional Information:   Back brace for a pt on Hospice//please call to discuss///[email or web address]

## 2020-01-06 NOTE — TELEPHONE ENCOUNTER
Spoke with Willian kat and informed him pt can still get back brace even though pt is on hospice, as patient is on Hospice for others reasons that are not concerning her back and should not interfere with anything else going on , but if so give our clinic a call with any concerns.

## 2020-01-30 ENCOUNTER — TELEPHONE (OUTPATIENT)
Dept: NEUROSURGERY | Facility: CLINIC | Age: 85
End: 2020-01-30

## 2020-01-30 NOTE — TELEPHONE ENCOUNTER
Left message informing pt we received a message from PCP to eval pt for back pain, left call back number//ns

## 2020-02-07 ENCOUNTER — TELEPHONE (OUTPATIENT)
Dept: NEUROSURGERY | Facility: CLINIC | Age: 85
End: 2020-02-07

## 2020-02-07 NOTE — TELEPHONE ENCOUNTER
Called pt number line was busy called Arya filipe left message in reference to the below//ns    ----- Message from Daniel Vila PA-C sent at 12/24/2019  7:17 AM CST -----  Hey! Can you ask him about this patient today?   She has compression fx but cannot lie down for any studies.       ----- Message -----  From: Maggy Ngo MD  Sent: 12/23/2019  11:21 AM CST  To: Daniel Vila PA-C    She has dementia, and will be hard for her to lay flat or sit still.  How long do you think it would take?  Would a stand MRI be considered? I know we do not have open mri here but we could try external. What do you think?

## 2020-02-18 ENCOUNTER — TELEPHONE (OUTPATIENT)
Dept: NEUROSURGERY | Facility: CLINIC | Age: 85
End: 2020-02-18

## 2020-02-18 NOTE — TELEPHONE ENCOUNTER
Spoke with Brenton, to inform of the below, stated pt has passed//ns    ----- Message from Daniel Vila PA-C sent at 12/24/2019  7:17 AM CST -----  Hey! Can you ask him about this patient today?   She has compression fx but cannot lie down for any studies.       ----- Message -----  From: Maggy Ngo MD  Sent: 12/23/2019  11:21 AM CST  To: Daniel Vila PA-C    She has dementia, and will be hard for her to lay flat or sit still.  How long do you think it would take?  Would a stand MRI be considered? I know we do not have open mri here but we could try external. What do you think?

## 2022-11-07 ENCOUNTER — PATIENT OUTREACH (OUTPATIENT)
Dept: ADMINISTRATIVE | Facility: HOSPITAL | Age: 87
End: 2022-11-07
Payer: MEDICARE

## 2022-11-15 ENCOUNTER — PATIENT OUTREACH (OUTPATIENT)
Dept: ADMINISTRATIVE | Facility: HOSPITAL | Age: 87
End: 2022-11-15
Payer: MEDICARE
